# Patient Record
Sex: MALE | Race: WHITE | Employment: UNEMPLOYED | URBAN - METROPOLITAN AREA
[De-identification: names, ages, dates, MRNs, and addresses within clinical notes are randomized per-mention and may not be internally consistent; named-entity substitution may affect disease eponyms.]

---

## 2017-01-16 ENCOUNTER — OFFICE VISIT (OUTPATIENT)
Dept: INTERNAL MEDICINE CLINIC | Facility: CLINIC | Age: 22
End: 2017-01-16

## 2017-01-16 VITALS
BODY MASS INDEX: 30.92 KG/M2 | OXYGEN SATURATION: 98 % | HEART RATE: 102 BPM | WEIGHT: 204 LBS | RESPIRATION RATE: 18 BRPM | HEIGHT: 68 IN | DIASTOLIC BLOOD PRESSURE: 68 MMHG | SYSTOLIC BLOOD PRESSURE: 122 MMHG | TEMPERATURE: 98 F

## 2017-01-16 DIAGNOSIS — J45.20 EXTRINSIC ASTHMA, MILD INTERMITTENT, UNCOMPLICATED: ICD-10-CM

## 2017-01-16 DIAGNOSIS — J01.01 ACUTE RECURRENT MAXILLARY SINUSITIS: Primary | ICD-10-CM

## 2017-01-16 DIAGNOSIS — J32.9 CHRONIC SINUSITIS, UNSPECIFIED LOCATION: ICD-10-CM

## 2017-01-16 DIAGNOSIS — K52.9 ACUTE GASTROENTERITIS: ICD-10-CM

## 2017-01-16 PROBLEM — F32.A DEPRESSION: Status: ACTIVE | Noted: 2017-01-16

## 2017-01-16 PROCEDURE — 99214 OFFICE O/P EST MOD 30 MIN: CPT | Performed by: PHYSICIAN ASSISTANT

## 2017-01-16 RX ORDER — AMOXICILLIN 875 MG/1
875 TABLET, COATED ORAL 2 TIMES DAILY
Qty: 20 TABLET | Refills: 0 | Status: SHIPPED | OUTPATIENT
Start: 2017-01-16 | End: 2017-02-17 | Stop reason: ALTCHOICE

## 2017-01-16 RX ORDER — ESCITALOPRAM OXALATE 10 MG/1
10 TABLET ORAL
Refills: 2 | COMMUNITY
Start: 2016-12-15

## 2017-01-16 RX ORDER — LAMOTRIGINE 25 MG/1
TABLET ORAL
Refills: 1 | COMMUNITY
Start: 2016-12-15 | End: 2018-06-24

## 2017-01-16 NOTE — PATIENT INSTRUCTIONS
Continue current meds  Take antibiotic as directed until completely finished. Contact us if you experience any adverse reaction to the medication. Rest, drink small amount of electrolyte-containing liquids frequently, and eat a bland diet.  Go to ER if u

## 2017-01-16 NOTE — PROGRESS NOTES
HPI:    Patient ID: Livia Nelson is a 25year old male. Patient presents with:  Cough: green productive cough, nausea, diarrhea, vomiting, fevers x5 days     Sinusitis  This is a recurrent problem. The current episode started in the past 7 days.  Richardson Goodpasture Gastrointestinal: Positive for nausea, vomiting, abdominal pain (epigastric, burning) and diarrhea. Negative for constipation and blood in stool. Musculoskeletal: Negative for myalgias, back pain, arthralgias and neck pain.    Skin: Negative for color c Conjunctivae and EOM are normal. Pupils are equal, round, and reactive to light. No scleral icterus. Cardiovascular: Normal rate, regular rhythm, normal heart sounds and intact distal pulses.     Pulmonary/Chest: Effort normal and breath sounds normal. No

## 2017-01-23 DIAGNOSIS — J45.20 EXTRINSIC ASTHMA, MILD INTERMITTENT, UNCOMPLICATED: Primary | ICD-10-CM

## 2017-01-23 DIAGNOSIS — J32.9 CHRONIC SINUSITIS, UNSPECIFIED LOCATION: ICD-10-CM

## 2017-01-23 RX ORDER — MONTELUKAST SODIUM 10 MG/1
TABLET ORAL
Qty: 90 TABLET | Refills: 1 | Status: SHIPPED | OUTPATIENT
Start: 2017-01-23 | End: 2017-02-17

## 2017-02-17 ENCOUNTER — OFFICE VISIT (OUTPATIENT)
Dept: INTERNAL MEDICINE CLINIC | Facility: CLINIC | Age: 22
End: 2017-02-17

## 2017-02-17 VITALS
WEIGHT: 222 LBS | OXYGEN SATURATION: 96 % | SYSTOLIC BLOOD PRESSURE: 128 MMHG | HEIGHT: 68 IN | TEMPERATURE: 99 F | DIASTOLIC BLOOD PRESSURE: 86 MMHG | BODY MASS INDEX: 33.65 KG/M2 | HEART RATE: 96 BPM | RESPIRATION RATE: 12 BRPM

## 2017-02-17 DIAGNOSIS — R63.5 WEIGHT GAIN DUE TO MEDICATION: ICD-10-CM

## 2017-02-17 DIAGNOSIS — T50.905A WEIGHT GAIN DUE TO MEDICATION: ICD-10-CM

## 2017-02-17 DIAGNOSIS — R07.0 THROAT PAIN: Primary | ICD-10-CM

## 2017-02-17 PROCEDURE — 99214 OFFICE O/P EST MOD 30 MIN: CPT | Performed by: PHYSICIAN ASSISTANT

## 2017-02-17 RX ORDER — SULFAMETHOXAZOLE AND TRIMETHOPRIM 800; 160 MG/1; MG/1
1 TABLET ORAL 2 TIMES DAILY
COMMUNITY
End: 2017-03-01

## 2017-02-17 NOTE — PROGRESS NOTES
HPI:    Patient ID: Dex Cordova is a 25year old male. Throat Problem  This is a new problem. The current episode started in the past 7 days. The problem occurs constantly. The problem has been unchanged.  Associated symptoms include a sore thro Prescriptions:  Sulfamethoxazole-TMP -160 MG Oral Tab per tablet Take 1 tablet by mouth 2 (two) times daily. Disp:  Rfl:    Lidocaine Viscous 2 % Mouth/Throat Solution Swish and spit 5mL of solution every 3 hours as needed.   Do not exceed 8 times per posterior oropharyngeal erythema. Tonsils absent  Voice normal   Eyes: Conjunctivae and EOM are normal. Pupils are equal, round, and reactive to light. Neck: Normal range of motion.    Cardiovascular: Normal rate, regular rhythm, normal heart sounds and

## 2017-02-17 NOTE — PATIENT INSTRUCTIONS
Swish and spit viscous lidocaine every 3 hours as needed for throat pain  Watch calorie intake (aiming for 6088-9008 Calories per day) and exercise 3-4 days per week    Weight Management: Healthy Eating  Food is your body’s fuel. You can’t live without it. fast eaters may tend to be overeaters.   · Pay attention to what you eat. Don’t read or watch TV during your meal.  Date Last Reviewed: 1/31/2016  © 4675-1030 [address], [address] [place] [address] [place]. All rights reserved.  This i

## 2017-03-24 ENCOUNTER — LAB ENCOUNTER (OUTPATIENT)
Dept: LAB | Age: 22
End: 2017-03-24
Attending: PHYSICIAN ASSISTANT
Payer: COMMERCIAL

## 2017-03-24 DIAGNOSIS — Z01.89 ENCOUNTER FOR ROUTINE LABORATORY TESTING: ICD-10-CM

## 2017-03-24 LAB
ALBUMIN SERPL-MCNC: 4.1 G/DL (ref 3.5–4.8)
ALP LIVER SERPL-CCNC: 94 U/L (ref 45–117)
ALT SERPL-CCNC: 90 U/L (ref 17–63)
AST SERPL-CCNC: 52 U/L (ref 15–41)
BASOPHILS # BLD AUTO: 0.05 X10(3) UL (ref 0–0.1)
BASOPHILS NFR BLD AUTO: 0.9 %
BILIRUB SERPL-MCNC: 0.3 MG/DL (ref 0.1–2)
BILIRUB UR QL STRIP.AUTO: NEGATIVE
BUN BLD-MCNC: 16 MG/DL (ref 8–20)
CALCIUM BLD-MCNC: 8.9 MG/DL (ref 8.3–10.3)
CHLORIDE: 101 MMOL/L (ref 101–111)
CHOLEST SMN-MCNC: 239 MG/DL (ref ?–170)
CLARITY UR REFRACT.AUTO: CLEAR
CO2: 27 MMOL/L (ref 22–32)
COLOR UR AUTO: YELLOW
CREAT BLD-MCNC: 1 MG/DL (ref 0.7–1.3)
EOSINOPHIL # BLD AUTO: 0.13 X10(3) UL (ref 0–0.3)
EOSINOPHIL NFR BLD AUTO: 2.3 %
ERYTHROCYTE [DISTWIDTH] IN BLOOD BY AUTOMATED COUNT: 13.1 % (ref 11.5–16)
EST. AVERAGE GLUCOSE BLD GHB EST-MCNC: 97 MG/DL (ref 68–126)
GLUCOSE BLD-MCNC: 103 MG/DL (ref 70–99)
GLUCOSE UR STRIP.AUTO-MCNC: NEGATIVE MG/DL
HBA1C MFR BLD HPLC: 5 % (ref ?–5.7)
HCT VFR BLD AUTO: 43.4 % (ref 37–53)
HDLC SERPL-MCNC: 61 MG/DL (ref 45–?)
HDLC SERPL: 3.92 {RATIO} (ref ?–4.97)
HGB BLD-MCNC: 14.9 G/DL (ref 13–17)
IMMATURE GRANULOCYTE COUNT: 0.04 X10(3) UL (ref 0–1)
IMMATURE GRANULOCYTE RATIO %: 0.7 %
KETONES UR STRIP.AUTO-MCNC: NEGATIVE MG/DL
LDLC SERPL CALC-MCNC: 146 MG/DL (ref ?–100)
LYMPHOCYTES # BLD AUTO: 2.2 X10(3) UL (ref 0.9–4)
LYMPHOCYTES NFR BLD AUTO: 39.7 %
M PROTEIN MFR SERPL ELPH: 7.3 G/DL (ref 6.1–8.3)
MCH RBC QN AUTO: 32.7 PG (ref 27–33.2)
MCHC RBC AUTO-ENTMCNC: 34.3 G/DL (ref 31–37)
MCV RBC AUTO: 95.4 FL (ref 80–99)
MONOCYTES # BLD AUTO: 0.61 X10(3) UL (ref 0.1–0.6)
MONOCYTES NFR BLD AUTO: 11 %
NEUTROPHIL ABS PRELIM: 2.51 X10 (3) UL (ref 1.3–6.7)
NEUTROPHILS # BLD AUTO: 2.51 X10(3) UL (ref 1.3–6.7)
NEUTROPHILS NFR BLD AUTO: 45.4 %
NITRITE UR QL STRIP.AUTO: NEGATIVE
NONHDLC SERPL-MCNC: 178 MG/DL (ref ?–120)
PH UR STRIP.AUTO: 7 [PH] (ref 4.5–8)
PLATELET # BLD AUTO: 184 10(3)UL (ref 150–450)
POTASSIUM SERPL-SCNC: 4.3 MMOL/L (ref 3.6–5.1)
PROT UR STRIP.AUTO-MCNC: NEGATIVE MG/DL
RBC # BLD AUTO: 4.55 X10(6)UL (ref 4.3–5.7)
RBC UR QL AUTO: NEGATIVE
RED CELL DISTRIBUTION WIDTH-SD: 45.3 FL (ref 35.1–46.3)
SODIUM SERPL-SCNC: 136 MMOL/L (ref 136–144)
SP GR UR STRIP.AUTO: 1.02 (ref 1–1.03)
TRIGLYCERIDES: 160 MG/DL (ref ?–90)
TSI SER-ACNC: 4.13 MIU/ML (ref 0.35–5.5)
UROBILINOGEN UR STRIP.AUTO-MCNC: <2 MG/DL
VLDL: 32 MG/DL (ref 5–40)
WBC # BLD AUTO: 5.5 X10(3) UL (ref 4–13)

## 2017-03-24 PROCEDURE — 81001 URINALYSIS AUTO W/SCOPE: CPT

## 2017-03-24 PROCEDURE — 80053 COMPREHEN METABOLIC PANEL: CPT

## 2017-03-24 PROCEDURE — 85025 COMPLETE CBC W/AUTO DIFF WBC: CPT

## 2017-03-24 PROCEDURE — 87086 URINE CULTURE/COLONY COUNT: CPT

## 2017-03-24 PROCEDURE — 83036 HEMOGLOBIN GLYCOSYLATED A1C: CPT

## 2017-03-24 PROCEDURE — 36415 COLL VENOUS BLD VENIPUNCTURE: CPT

## 2017-03-24 PROCEDURE — 80061 LIPID PANEL: CPT

## 2017-03-24 PROCEDURE — 84443 ASSAY THYROID STIM HORMONE: CPT

## 2017-04-03 ENCOUNTER — OFFICE VISIT (OUTPATIENT)
Dept: INTERNAL MEDICINE CLINIC | Facility: CLINIC | Age: 22
End: 2017-04-03

## 2017-04-03 VITALS
WEIGHT: 219 LBS | TEMPERATURE: 99 F | HEIGHT: 68 IN | OXYGEN SATURATION: 98 % | SYSTOLIC BLOOD PRESSURE: 138 MMHG | RESPIRATION RATE: 16 BRPM | BODY MASS INDEX: 33.19 KG/M2 | DIASTOLIC BLOOD PRESSURE: 84 MMHG | HEART RATE: 100 BPM

## 2017-04-03 DIAGNOSIS — T50.905A WEIGHT GAIN DUE TO MEDICATION: ICD-10-CM

## 2017-04-03 DIAGNOSIS — Z00.00 ROUTINE PHYSICAL EXAMINATION: Primary | ICD-10-CM

## 2017-04-03 DIAGNOSIS — K52.9 ACUTE GASTROENTERITIS: ICD-10-CM

## 2017-04-03 DIAGNOSIS — R63.5 WEIGHT GAIN DUE TO MEDICATION: ICD-10-CM

## 2017-04-03 DIAGNOSIS — M26.609 TMJ (TEMPOROMANDIBULAR JOINT DISORDER): ICD-10-CM

## 2017-04-03 DIAGNOSIS — R74.01 ELEVATED TRANSAMINASE LEVEL: ICD-10-CM

## 2017-04-03 DIAGNOSIS — E78.2 MIXED HYPERLIPIDEMIA: ICD-10-CM

## 2017-04-03 PROCEDURE — 99395 PREV VISIT EST AGE 18-39: CPT | Performed by: PHYSICIAN ASSISTANT

## 2017-04-03 RX ORDER — NAPROXEN 500 MG/1
500 TABLET ORAL 2 TIMES DAILY WITH MEALS
Qty: 28 TABLET | Refills: 0 | Status: SHIPPED | OUTPATIENT
Start: 2017-04-03 | End: 2018-01-05 | Stop reason: ALTCHOICE

## 2017-04-03 NOTE — PATIENT INSTRUCTIONS
Take naproxen twice daily with meals, as needed, for TMJ pain    Pain Relief Methods for Temporomandibular Disorders (TMD)  You have been diagnosed with temporomandibular disorder (TMD).  This term describes a group of problems related to the temporomandibu the touch and may refer pain to other places. Your health care provider can focus on trigger points using:  · Massage, both inside and outside the mouth. This relaxes muscles and improves circulation.   · Palpation, which is applying pressure to points of t energy without eating too much. Reading food labels can help you make healthy choices. Also, learn new eating habits to manage your weight. All the values on the label are based on one serving. The serving size is the average portion.  Remember to multi professional medical care. Always follow your healthcare professional's instructions.

## 2017-04-03 NOTE — PROGRESS NOTES
Wellness Exam    CC: Patient is presenting for a wellness exam    HPI:   Current Complaints: n/v/d today only; cramping abdomen but no pain otherwise. + chills/sweats, also Cough/congestion and right ear pain x 4 days. Ear pain radiates down to his jaw.  Leeanna Eason Fluticasone Propionate (FLONASE) 50 MCG/ACT Nasal Suspension INSTILL 2 SPRAYS BY NASAL ROUTE DAILY Disp: 48 g Rfl: 1   LORazepam (ATIVAN) 0.5 MG Oral Tab TAKE 1/2 TO 1 TABLET BY MOUTH AT BEDTIME Disp:  Rfl: 1   omeprazole (PRILOSEC) 20 MG Oral Capsule Courtney Estevez icterus. Fundoscopic exam: Red reflex b/l. No exudates, hemorrhages, a/v nicking, or papilledema seen b/l. Neck: Normal range of motion. No thyromegaly present. Cardiovascular: Normal rate, regular rhythm and normal heart sounds.   Exam reveals no fricti

## 2017-04-04 ENCOUNTER — HOSPITAL ENCOUNTER (EMERGENCY)
Facility: HOSPITAL | Age: 22
Discharge: HOME | End: 2017-04-04
Attending: EMERGENCY MEDICINE
Payer: COMMERCIAL

## 2017-04-04 VITALS
BODY MASS INDEX: 33 KG/M2 | OXYGEN SATURATION: 98 % | DIASTOLIC BLOOD PRESSURE: 70 MMHG | SYSTOLIC BLOOD PRESSURE: 124 MMHG | TEMPERATURE: 98 F | WEIGHT: 218.94 LBS | RESPIRATION RATE: 16 BRPM | HEART RATE: 68 BPM

## 2017-04-04 DIAGNOSIS — E86.0 DEHYDRATION: ICD-10-CM

## 2017-04-04 DIAGNOSIS — K52.9 GASTROENTERITIS: Primary | ICD-10-CM

## 2017-04-04 PROCEDURE — 96361 HYDRATE IV INFUSION ADD-ON: CPT

## 2017-04-04 PROCEDURE — 96375 TX/PRO/DX INJ NEW DRUG ADDON: CPT

## 2017-04-04 PROCEDURE — 96374 THER/PROPH/DIAG INJ IV PUSH: CPT

## 2017-04-04 PROCEDURE — S0028 INJECTION, FAMOTIDINE, 20 MG: HCPCS | Performed by: EMERGENCY MEDICINE

## 2017-04-04 PROCEDURE — 83690 ASSAY OF LIPASE: CPT | Performed by: EMERGENCY MEDICINE

## 2017-04-04 PROCEDURE — 85025 COMPLETE CBC W/AUTO DIFF WBC: CPT | Performed by: EMERGENCY MEDICINE

## 2017-04-04 PROCEDURE — 99284 EMERGENCY DEPT VISIT MOD MDM: CPT

## 2017-04-04 PROCEDURE — 80053 COMPREHEN METABOLIC PANEL: CPT | Performed by: EMERGENCY MEDICINE

## 2017-04-04 RX ORDER — KETOROLAC TROMETHAMINE 30 MG/ML
30 INJECTION, SOLUTION INTRAMUSCULAR; INTRAVENOUS ONCE
Status: COMPLETED | OUTPATIENT
Start: 2017-04-04 | End: 2017-04-04

## 2017-04-04 RX ORDER — FAMOTIDINE 10 MG/ML
20 INJECTION, SOLUTION INTRAVENOUS ONCE
Status: COMPLETED | OUTPATIENT
Start: 2017-04-04 | End: 2017-04-04

## 2017-04-04 RX ORDER — ONDANSETRON 8 MG/1
8 TABLET, ORALLY DISINTEGRATING ORAL EVERY 4 HOURS PRN
Qty: 10 TABLET | Refills: 0 | Status: SHIPPED | OUTPATIENT
Start: 2017-04-04 | End: 2017-04-11

## 2017-04-04 RX ORDER — MORPHINE SULFATE 4 MG/ML
4 INJECTION, SOLUTION INTRAMUSCULAR; INTRAVENOUS ONCE
Status: DISCONTINUED | OUTPATIENT
Start: 2017-04-04 | End: 2017-04-04

## 2017-04-04 RX ORDER — ONDANSETRON 2 MG/ML
4 INJECTION INTRAMUSCULAR; INTRAVENOUS ONCE
Status: COMPLETED | OUTPATIENT
Start: 2017-04-04 | End: 2017-04-04

## 2017-04-04 NOTE — ED PROVIDER NOTES
Patient Seen in: [facility] Emergency Department    History   Patient presents with:  Nausea/Vomiting/Diarrhea (gastrointestinal)    Stated Complaint: vomiting    HPI    This is a 16-year-old male complaining of vomiting and diarrhea for the last 24 Fluticasone Propionate (FLONASE) 50 MCG/ACT Nasal Suspension,  INSTILL 2 SPRAYS BY NASAL ROUTE DAILY   LORazepam (ATIVAN) 0.5 MG Oral Tab,  TAKE 1/2 TO 1 TABLET BY MOUTH AT BEDTIME   omeprazole (PRILOSEC) 20 MG Oral Capsule Delayed Release,  TAKE ONE CAPSU 1034 97.4 °F (36.3 °C)   Temp src 04/04/17 1034 Temporal   SpO2 04/04/17 1034 97 %   O2 Device 04/04/17 1034 None (Room air)       Current:/70 mmHg  Pulse 68  Temp(Src) 97.9 °F (36.6 °C) (Temporal)  Resp 16  Wt 99.3 kg  SpO2 98%        Physical Exam Please view results for these tests on the individual orders. CBC W/ DIFFERENTIAL     The patient had an IV placed, and was given a bolus of normal saline. He was given Zofran through the IV. Vital signs improved and the patient felt better.

## 2017-04-17 ENCOUNTER — APPOINTMENT (OUTPATIENT)
Dept: GENERAL RADIOLOGY | Facility: HOSPITAL | Age: 22
End: 2017-04-17
Attending: PEDIATRICS
Payer: COMMERCIAL

## 2017-04-17 ENCOUNTER — HOSPITAL ENCOUNTER (EMERGENCY)
Facility: HOSPITAL | Age: 22
Discharge: HOME | End: 2017-04-17
Attending: PEDIATRICS
Payer: COMMERCIAL

## 2017-04-17 VITALS
OXYGEN SATURATION: 97 % | TEMPERATURE: 98 F | WEIGHT: 210 LBS | BODY MASS INDEX: 32 KG/M2 | DIASTOLIC BLOOD PRESSURE: 79 MMHG | SYSTOLIC BLOOD PRESSURE: 124 MMHG | RESPIRATION RATE: 16 BRPM | HEART RATE: 78 BPM

## 2017-04-17 DIAGNOSIS — S93.401A SPRAIN OF RIGHT ANKLE, UNSPECIFIED LIGAMENT, INITIAL ENCOUNTER: Primary | ICD-10-CM

## 2017-04-17 PROCEDURE — 99283 EMERGENCY DEPT VISIT LOW MDM: CPT

## 2017-04-17 PROCEDURE — 73610 X-RAY EXAM OF ANKLE: CPT

## 2017-04-17 RX ORDER — IBUPROFEN 600 MG/1
600 TABLET ORAL ONCE
Status: DISCONTINUED | OUTPATIENT
Start: 2017-04-17 | End: 2017-04-17

## 2017-04-17 NOTE — ED INITIAL ASSESSMENT (HPI)
Slipped going up stairs approximately 1 hour ago and rolled right ankle / swelling noted to lateral aspect pt reports unable to bear weight

## 2017-04-17 NOTE — ED PROVIDER NOTES
Patient Seen in: [facility] Emergency Department    History   Patient presents with:  Lower Extremity Injury (musculoskeletal)    Stated Complaint: right ankle injury    HPI    65-year-old male complaining of right lateral ankle pain after he rolled Oral Capsule Delayed Release,  TAKE ONE CAPSULE BY MOUTH 3 TO 4 TIMES PER WEEK, 1/2 HOUR PRIOR TO BREAKFAST   Loratadine (CLARITIN OR),  Take 1 tablet by mouth daily.    Albuterol Sulfate (PROAIR HFA IN),  Inhale 2 puffs into the lungs every 6 (six) hours a (Temporal)  Resp 16  Wt 95.255 kg  SpO2 97%        Physical Exam   RLE: Tender with swelling over right lateral malleolus; able to flex and extend at the ankle; able to wiggle toes; will not bear weight right leg due to right lateral ankle pain

## 2017-05-02 ENCOUNTER — TELEPHONE (OUTPATIENT)
Dept: INTERNAL MEDICINE CLINIC | Facility: CLINIC | Age: 22
End: 2017-05-02

## 2017-05-02 NOTE — TELEPHONE ENCOUNTER
Reviewed with the pt asthma Rx treatment. A copy of the AAP will be mailed to the pt's home address once the plan is approved by Dr. Emily Grajeda.

## 2017-05-22 ENCOUNTER — OFFICE VISIT (OUTPATIENT)
Dept: INTERNAL MEDICINE CLINIC | Facility: CLINIC | Age: 22
End: 2017-05-22

## 2017-05-22 VITALS
WEIGHT: 217 LBS | SYSTOLIC BLOOD PRESSURE: 122 MMHG | OXYGEN SATURATION: 97 % | TEMPERATURE: 98 F | BODY MASS INDEX: 32.89 KG/M2 | HEART RATE: 96 BPM | RESPIRATION RATE: 16 BRPM | DIASTOLIC BLOOD PRESSURE: 74 MMHG | HEIGHT: 68 IN

## 2017-05-22 DIAGNOSIS — J01.91 ACUTE RECURRENT SINUSITIS, UNSPECIFIED LOCATION: Primary | ICD-10-CM

## 2017-05-22 PROCEDURE — 99213 OFFICE O/P EST LOW 20 MIN: CPT | Performed by: PHYSICIAN ASSISTANT

## 2017-05-22 RX ORDER — SULFAMETHOXAZOLE AND TRIMETHOPRIM 800; 160 MG/1; MG/1
1 TABLET ORAL 2 TIMES DAILY
COMMUNITY
End: 2017-08-30

## 2017-05-22 RX ORDER — SULFAMETHOXAZOLE AND TRIMETHOPRIM 800; 160 MG/1; MG/1
1 TABLET ORAL 2 TIMES DAILY
COMMUNITY
End: 2017-05-22

## 2017-05-22 RX ORDER — CEFDINIR 300 MG/1
300 CAPSULE ORAL 2 TIMES DAILY
Qty: 20 CAPSULE | Refills: 0 | Status: SHIPPED | OUTPATIENT
Start: 2017-05-22 | End: 2017-06-01

## 2017-05-22 NOTE — PATIENT INSTRUCTIONS
Take antibiotic as directed until completely finished. Contact us if you experience any adverse reaction to the medication. Continue mucinex and sudafed as needed.

## 2017-05-22 NOTE — PROGRESS NOTES
HPI:   Johan Monahan is a 25year old male who presents for upper respiratory symptoms for  4 days. Patient reports cough, sometimes triggers vomiting, PND, productive of thick/yellow mucus .  Patient denies sob, hemoptysis, rash, sore throat or diff ADENOIDECTOMY        Family History   Problem Relation Age of Onset   • Cancer Neg    • Depression Father    • Anxiety Father    • Hypertension Father    • Diabetes Father    • Hypertension Mother    • Depression Mother    • Asthma Mother    • Heart Diseas auscultation b/l normal effort. Cough is spasming/productive.   CARDIO: RRR without murmur rub or gallop  GI: soft, non-distended and nontender, no CVA tenderness    ASSESSMENT AND PLAN:   Sruthi Lee is a 25year old male who presents with Acute r

## 2017-05-24 ENCOUNTER — TELEPHONE (OUTPATIENT)
Dept: INTERNAL MEDICINE CLINIC | Facility: CLINIC | Age: 22
End: 2017-05-24

## 2017-05-24 RX ORDER — PREDNISONE 20 MG/1
20 TABLET ORAL DAILY
Qty: 7 TABLET | Refills: 0 | Status: SHIPPED | OUTPATIENT
Start: 2017-05-24 | End: 2017-05-31

## 2017-05-24 NOTE — TELEPHONE ENCOUNTER
Spoke with pt still coughing despite antibiotic and nebulizer. Per Marcos Leak continue current medications and add on Prednisone 20mg daily x 7 days. D/w pt above treatment plan he expressed understanding. Rx sent.

## 2017-05-25 ENCOUNTER — APPOINTMENT (OUTPATIENT)
Dept: GENERAL RADIOLOGY | Facility: HOSPITAL | Age: 22
End: 2017-05-25
Attending: EMERGENCY MEDICINE
Payer: COMMERCIAL

## 2017-05-25 ENCOUNTER — HOSPITAL ENCOUNTER (EMERGENCY)
Facility: HOSPITAL | Age: 22
Discharge: HOME | End: 2017-05-25
Attending: EMERGENCY MEDICINE
Payer: COMMERCIAL

## 2017-05-25 VITALS
BODY MASS INDEX: 32.88 KG/M2 | HEART RATE: 108 BPM | SYSTOLIC BLOOD PRESSURE: 130 MMHG | HEIGHT: 67.99 IN | TEMPERATURE: 97 F | RESPIRATION RATE: 20 BRPM | DIASTOLIC BLOOD PRESSURE: 74 MMHG | WEIGHT: 216.94 LBS | OXYGEN SATURATION: 100 %

## 2017-05-25 DIAGNOSIS — J45.901 ASTHMA EXACERBATION: Primary | ICD-10-CM

## 2017-05-25 DIAGNOSIS — F41.9 ANXIETY: ICD-10-CM

## 2017-05-25 PROCEDURE — 99283 EMERGENCY DEPT VISIT LOW MDM: CPT

## 2017-05-25 PROCEDURE — 94640 AIRWAY INHALATION TREATMENT: CPT

## 2017-05-25 PROCEDURE — 71020 XR CHEST PA + LAT CHEST (CPT=71020): CPT | Performed by: EMERGENCY MEDICINE

## 2017-05-25 PROCEDURE — 99284 EMERGENCY DEPT VISIT MOD MDM: CPT

## 2017-05-25 RX ORDER — PREDNISONE 20 MG/1
40 TABLET ORAL ONCE
Status: DISCONTINUED | OUTPATIENT
Start: 2017-05-25 | End: 2017-05-25

## 2017-05-25 RX ORDER — PREDNISONE 20 MG/1
60 TABLET ORAL ONCE
Status: COMPLETED | OUTPATIENT
Start: 2017-05-25 | End: 2017-05-25

## 2017-05-25 RX ORDER — ALPRAZOLAM 0.25 MG/1
0.25 TABLET ORAL 3 TIMES DAILY PRN
Qty: 10 TABLET | Refills: 0 | Status: SHIPPED | OUTPATIENT
Start: 2017-05-25 | End: 2017-06-01

## 2017-05-25 RX ORDER — PREDNISONE 20 MG/1
20 TABLET ORAL DAILY
Qty: 5 TABLET | Refills: 0 | Status: SHIPPED | OUTPATIENT
Start: 2017-05-25 | End: 2017-05-30

## 2017-05-25 RX ORDER — IPRATROPIUM BROMIDE AND ALBUTEROL SULFATE 2.5; .5 MG/3ML; MG/3ML
3 SOLUTION RESPIRATORY (INHALATION) ONCE
Status: COMPLETED | OUTPATIENT
Start: 2017-05-25 | End: 2017-05-25

## 2017-05-25 RX ORDER — ALPRAZOLAM 0.5 MG/1
0.5 TABLET ORAL ONCE
Status: COMPLETED | OUTPATIENT
Start: 2017-05-25 | End: 2017-05-25

## 2017-05-25 NOTE — ED INITIAL ASSESSMENT (HPI)
Shortness of breath starting this am.  Cold since last Thursday. Started on abx monday. Started on steroid yesterday. Nebs as needed.   Last neb today at 11am.  Hx asthma

## 2017-05-25 NOTE — ED PROVIDER NOTES
Patient Seen in: [facility] Emergency Department    History   Patient presents with:  Dyspnea CORRY SOB (respiratory)    Stated Complaint: CORRY    HPI    28-year-old male comes the hospital with a complaint of having difficulty with feeling short of br escitalopram 10 MG Oral Tab,  Take 10 mg by mouth once daily.    lamoTRIgine 25 MG Oral Tab,  2 tablets twice daily   PATADAY 0.2 % Ophthalmic Solution,     Montelukast Sodium (SINGULAIR) 10 MG Oral Tab,  TAKE 1 TABLET BY MOUTH AT BEDTIME   Fluticasone Prop noted above. PSFH elements reviewed from today and agreed except as otherwise stated in HPI.     Physical Exam       ED Triage Vitals   BP 05/25/17 1257 147/86 mmHg   Pulse 05/25/17 1257 114   Resp 05/25/17 1257 22   Temp 05/25/17 1257 97.3 °F (36.3 °C) silhouette. MEDIASTINUM: Normal.  PLEURA: Normal. No pleural effusions. BONES: Normal for age.             Medications   ALPRAZolam (XANAX) tab 0.5 mg (0.5 mg Oral Given 5/25/17 1321)   ipratropium-albuterol (DUONEB) nebulizer solution 3 mL (3 mL Nebuliza

## 2017-05-30 ENCOUNTER — OFFICE VISIT (OUTPATIENT)
Dept: INTERNAL MEDICINE CLINIC | Facility: CLINIC | Age: 22
End: 2017-05-30

## 2017-05-30 VITALS
SYSTOLIC BLOOD PRESSURE: 130 MMHG | HEART RATE: 85 BPM | DIASTOLIC BLOOD PRESSURE: 88 MMHG | HEIGHT: 67.5 IN | BODY MASS INDEX: 33.51 KG/M2 | RESPIRATION RATE: 20 BRPM | WEIGHT: 216 LBS | TEMPERATURE: 99 F | OXYGEN SATURATION: 99 %

## 2017-05-30 DIAGNOSIS — J32.0 CHRONIC MAXILLARY SINUSITIS: ICD-10-CM

## 2017-05-30 DIAGNOSIS — R04.0 RECURRENT EPISTAXIS: ICD-10-CM

## 2017-05-30 DIAGNOSIS — J45.21 EXTRINSIC ASTHMA, MILD INTERMITTENT, WITH ACUTE EXACERBATION: Primary | ICD-10-CM

## 2017-05-30 PROCEDURE — 99214 OFFICE O/P EST MOD 30 MIN: CPT | Performed by: PHYSICIAN ASSISTANT

## 2017-05-30 NOTE — PROGRESS NOTES
Ashly Garza is a 25year old male. HPI:   Pt here for ER f/u was having dyspnea and asthma flare. Course as follows:           The patient received his prednisone as well as a DuoNeb and Xanax and feels markedly better at this time.  Lungs are tory omeprazole (PRILOSEC) 20 MG Oral Capsule Delayed Release TAKE ONE CAPSULE BY MOUTH 3 TO 4 TIMES PER WEEK, 1/2 HOUR PRIOR TO BREAKFAST Disp:  Rfl: 3   Loratadine (CLARITIN OR) Take 1 tablet by mouth daily.  Disp:  Rfl:    Albuterol Sulfate (PROAIR HFA IN) ENT      The patient indicates understanding of these issues and agrees to the plan. The patient is asked to return in prn.

## 2017-05-30 NOTE — PATIENT INSTRUCTIONS
Finish prednisone and omnicef antibiotic as prescribed  Continue daily asthma management  Schedule follow-up with Dr. Justin  for chronic sinusitis, recurrent nosebleeds and allergies.

## 2017-06-30 ENCOUNTER — OFFICE VISIT (OUTPATIENT)
Dept: INTERNAL MEDICINE CLINIC | Facility: CLINIC | Age: 22
End: 2017-06-30

## 2017-06-30 VITALS
SYSTOLIC BLOOD PRESSURE: 126 MMHG | HEIGHT: 67.5 IN | OXYGEN SATURATION: 97 % | DIASTOLIC BLOOD PRESSURE: 80 MMHG | RESPIRATION RATE: 18 BRPM | WEIGHT: 226 LBS | BODY MASS INDEX: 35.06 KG/M2 | HEART RATE: 102 BPM | TEMPERATURE: 98 F

## 2017-06-30 DIAGNOSIS — J32.0 CHRONIC MAXILLARY SINUSITIS: ICD-10-CM

## 2017-06-30 DIAGNOSIS — IMO0001 MODERATE INTERMITTENT ASTHMA, WITH ACUTE EXACERBATION: ICD-10-CM

## 2017-06-30 DIAGNOSIS — J22 ACUTE LOWER RESPIRATORY INFECTION: Primary | ICD-10-CM

## 2017-06-30 PROCEDURE — 99214 OFFICE O/P EST MOD 30 MIN: CPT | Performed by: PHYSICIAN ASSISTANT

## 2017-06-30 RX ORDER — AZITHROMYCIN 250 MG/1
TABLET, FILM COATED ORAL
Qty: 6 TABLET | Refills: 0 | Status: SHIPPED | OUTPATIENT
Start: 2017-06-30 | End: 2017-07-18 | Stop reason: ALTCHOICE

## 2017-06-30 RX ORDER — MONTELUKAST SODIUM 10 MG/1
TABLET ORAL
Qty: 90 TABLET | Refills: 0 | Status: SHIPPED | OUTPATIENT
Start: 2017-06-30 | End: 2017-09-24

## 2017-06-30 RX ORDER — PREDNISONE 20 MG/1
20 TABLET ORAL 2 TIMES DAILY
Qty: 14 TABLET | Refills: 0 | Status: SHIPPED | OUTPATIENT
Start: 2017-06-30 | End: 2017-07-07

## 2017-06-30 NOTE — PATIENT INSTRUCTIONS
Schedule follow-up with ENT Dr. Day Kuhn as directed  Start daily inhaler: asmanex, 1 puff daily. Rinse mouth after use. Also start sinus rinses daily in the morning.      If symptoms begin to worsen including shortness of breath and wheezing, s

## 2017-06-30 NOTE — PROGRESS NOTES
HPI:   Munira Lee is a 23year old male who presents for upper respiratory symptoms for  4  days. Patient reports 'wet' cough with thick, green sputum, SOB and fatigue.  Patient denies sinus pressure, ear pain, wheezing, fever, rash, abdominal anil 1300 ) Disp: 28 tablet Rfl: 0      Past Medical History:   Diagnosis Date   • Allergic rhinitis    • Anxiety    • Asthma    • Esophageal reflux    • Extrinsic asthma, unspecified       Past Surgical History:  No date: ADENOIDECTOMY  No date: TONSILLECTOMY nourished,in no apparent distress  SKIN: no rashes, no suspicious lesions  EYES: PERRLA, EOMI, conjunctiva are clear  HEENT: atraumatic, normocephalic, TM's pearly gray with light reflex, oropharynx without erythema, exudates or tonsillar hypertrophy  NECK

## 2017-07-18 ENCOUNTER — OFFICE VISIT (OUTPATIENT)
Dept: INTERNAL MEDICINE CLINIC | Facility: CLINIC | Age: 22
End: 2017-07-18

## 2017-07-18 VITALS
TEMPERATURE: 99 F | DIASTOLIC BLOOD PRESSURE: 82 MMHG | BODY MASS INDEX: 35.37 KG/M2 | WEIGHT: 228 LBS | OXYGEN SATURATION: 99 % | HEART RATE: 115 BPM | SYSTOLIC BLOOD PRESSURE: 130 MMHG | HEIGHT: 67.5 IN | RESPIRATION RATE: 16 BRPM

## 2017-07-18 DIAGNOSIS — J32.0 CHRONIC MAXILLARY SINUSITIS: Primary | ICD-10-CM

## 2017-07-18 PROCEDURE — 99213 OFFICE O/P EST LOW 20 MIN: CPT | Performed by: PHYSICIAN ASSISTANT

## 2017-07-18 RX ORDER — AMOXICILLIN AND CLAVULANATE POTASSIUM 875; 125 MG/1; MG/1
1 TABLET, FILM COATED ORAL 2 TIMES DAILY
Qty: 28 TABLET | Refills: 0 | Status: SHIPPED | OUTPATIENT
Start: 2017-07-18 | End: 2017-08-30 | Stop reason: ALTCHOICE

## 2017-07-18 NOTE — PROGRESS NOTES
HPI:   Bhumi Wharton is a 23year old male who presents for upper respiratory symptoms for  4  days. Patient reports b/l ear pain and cough, congestion, sinus pressure worse on left side. Worse since flying to and from [place].  Patient denies fever, unspecified       Past Surgical History:  No date: ADENOIDECTOMY  No date: TONSILLECTOMY  No date: UPPER GI ENDOSCOPY - REFERRAL   Family History   Problem Relation Age of Onset   • Depression Father    • Anxiety Father    • Hypertension Father    • Diabet gray with light reflex, oropharynx with trace erythema + postnasal drip, no exudates or tonsillar hypertrophy  NECK: supple, no adenopathy, FROM  LUNGS: clear to auscultation b/l normal effort no w/r/r  CARDIO: RRR without murmur rub or gallop  GI: soft, n

## 2017-07-18 NOTE — PATIENT INSTRUCTIONS
Take antibiotic as directed until completely finished. Contact us if you experience any adverse reaction to the medication. Establish with Dr. Frank Baltazar for sinus issues.

## 2017-08-10 DIAGNOSIS — M26.609 TMJ (TEMPOROMANDIBULAR JOINT DISORDER): ICD-10-CM

## 2017-08-10 RX ORDER — NAPROXEN 500 MG/1
500 TABLET ORAL 2 TIMES DAILY WITH MEALS
Qty: 28 TABLET | Refills: 0 | OUTPATIENT
Start: 2017-08-10

## 2017-08-10 NOTE — TELEPHONE ENCOUNTER
Left message for patient to call office. Patient referred to PT and advised on conservative care for TMJ on 4/2017.

## 2017-08-12 ENCOUNTER — HOSPITAL ENCOUNTER (OUTPATIENT)
Dept: CT IMAGING | Facility: HOSPITAL | Age: 22
Discharge: HOME | End: 2017-08-12
Attending: OTOLARYNGOLOGY
Payer: COMMERCIAL

## 2017-08-12 DIAGNOSIS — J32.9 CHRONIC SINUSITIS, UNSPECIFIED LOCATION: ICD-10-CM

## 2017-08-12 PROCEDURE — 70486 CT MAXILLOFACIAL W/O DYE: CPT | Performed by: OTOLARYNGOLOGY

## 2017-08-30 ENCOUNTER — OFFICE VISIT (OUTPATIENT)
Dept: INTERNAL MEDICINE CLINIC | Facility: CLINIC | Age: 22
End: 2017-08-30

## 2017-08-30 VITALS
HEART RATE: 111 BPM | WEIGHT: 223 LBS | DIASTOLIC BLOOD PRESSURE: 80 MMHG | OXYGEN SATURATION: 99 % | RESPIRATION RATE: 16 BRPM | SYSTOLIC BLOOD PRESSURE: 140 MMHG | BODY MASS INDEX: 34.59 KG/M2 | HEIGHT: 67.5 IN

## 2017-08-30 DIAGNOSIS — J06.9 ACUTE URI: Primary | ICD-10-CM

## 2017-08-30 DIAGNOSIS — J45.21 MILD INTERMITTENT EXTRINSIC ASTHMA WITH ACUTE EXACERBATION: ICD-10-CM

## 2017-08-30 PROCEDURE — 99213 OFFICE O/P EST LOW 20 MIN: CPT | Performed by: PHYSICIAN ASSISTANT

## 2017-08-30 RX ORDER — CEFUROXIME AXETIL 500 MG/1
500 TABLET ORAL 2 TIMES DAILY
Qty: 20 TABLET | Refills: 0 | Status: SHIPPED | OUTPATIENT
Start: 2017-08-30 | End: 2018-02-04 | Stop reason: ALTCHOICE

## 2017-08-30 RX ORDER — PREDNISONE 20 MG/1
40 TABLET ORAL DAILY
Qty: 14 TABLET | Refills: 0 | Status: SHIPPED | OUTPATIENT
Start: 2017-08-30 | End: 2017-09-06

## 2017-08-30 NOTE — PROGRESS NOTES
HPI:   Carloz Riley is a 23year old male who presents for upper respiratory symptoms for  4  days. Patient reports cough with green sputum, temp u p t o 99.6, fatigue, green nasal discharge, some chest tightness and wheezing.  Patient denies hemopt Esophageal reflux    • Extrinsic asthma, unspecified       Past Surgical History:  No date: ADENOIDECTOMY  08/18/2017: OTHER SURGICAL HISTORY      Comment: balloon sinuplasty  No date: TONSILLECTOMY  No date: UPPER GI ENDOSCOPY - REFERRAL   Family History EOMI, conjunctiva are clear  HEENT: atraumatic, normocephalic, TM's dulled with serous effusion, oropharynx without erythema, exudates or tonsillar hypertrophy, + clear postnasal drip. Nasal mucosa inflamed.    NECK: supple, mild anterior cervical adenopath

## 2017-09-24 DIAGNOSIS — IMO0001 MODERATE INTERMITTENT ASTHMA, WITH ACUTE EXACERBATION: ICD-10-CM

## 2017-09-25 RX ORDER — MONTELUKAST SODIUM 10 MG/1
TABLET ORAL
Qty: 90 TABLET | Refills: 0 | Status: SHIPPED | OUTPATIENT
Start: 2017-09-25 | End: 2017-12-29

## 2017-12-29 DIAGNOSIS — IMO0001 MODERATE INTERMITTENT ASTHMA, WITH ACUTE EXACERBATION: ICD-10-CM

## 2017-12-29 RX ORDER — MONTELUKAST SODIUM 10 MG/1
TABLET ORAL
Qty: 90 TABLET | Refills: 0 | Status: SHIPPED | OUTPATIENT
Start: 2017-12-29 | End: 2018-03-30

## 2018-01-05 ENCOUNTER — OFFICE VISIT (OUTPATIENT)
Dept: INTERNAL MEDICINE CLINIC | Facility: CLINIC | Age: 23
End: 2018-01-05

## 2018-01-05 VITALS
OXYGEN SATURATION: 98 % | HEIGHT: 67.5 IN | WEIGHT: 251.5 LBS | SYSTOLIC BLOOD PRESSURE: 130 MMHG | BODY MASS INDEX: 39.01 KG/M2 | DIASTOLIC BLOOD PRESSURE: 80 MMHG | TEMPERATURE: 98 F | HEART RATE: 104 BPM | RESPIRATION RATE: 18 BRPM

## 2018-01-05 DIAGNOSIS — R07.89 CHEST WALL PAIN: Primary | ICD-10-CM

## 2018-01-05 PROCEDURE — 99214 OFFICE O/P EST MOD 30 MIN: CPT | Performed by: INTERNAL MEDICINE

## 2018-01-05 RX ORDER — ACETAMINOPHEN AND CODEINE PHOSPHATE 300; 30 MG/1; MG/1
1 TABLET ORAL EVERY 6 HOURS PRN
Qty: 30 TABLET | Refills: 0 | Status: SHIPPED | OUTPATIENT
Start: 2018-01-05 | End: 2018-05-02

## 2018-01-05 RX ORDER — DICLOFENAC SODIUM 75 MG/1
75 TABLET, DELAYED RELEASE ORAL 2 TIMES DAILY
Qty: 28 TABLET | Refills: 0 | Status: SHIPPED | OUTPATIENT
Start: 2018-01-05 | End: 2018-05-02

## 2018-01-05 NOTE — PATIENT INSTRUCTIONS
Noncardiac Chest Pain    Based on your visit today, the healthcare provider doesn’t know what is causing your chest pain. In most cases, people who come to the emergency department with chest pain don’t have a problem with their heart.  Instead, the pain · A change in the type of pain. Call if it feels different, becomes more serious, lasts longer, or begins to spread into your shoulder, arm, neck, jaw, or back.   · Shortness of breath  · You feel more pain when you breathe  · Cough with dark-colored mucus

## 2018-01-05 NOTE — PROGRESS NOTES
HPI:    Patient ID: Kelly Navas is a 23year old male. Chest Pain    This is a new problem. The current episode started yesterday. The onset quality is sudden. The problem occurs constantly. The problem has been gradually worsening.  The pain is at a Sulfamethoxazole-TMP -160 MG Oral Tab per tablet Take 1 tablet by mouth 2 (two) times daily. Disp: 60 tablet Rfl: 5   Cefuroxime Axetil 500 MG Oral Tab Take 1 tablet (500 mg total) by mouth 2 (two) times daily.  Disp: 20 tablet Rfl: 0   Mometasone Fur No orders of the defined types were placed in this encounter. Meds This Visit:  Signed Prescriptions Disp Refills    Diclofenac Sodium 75 MG Oral Tab EC 28 tablet 0      Sig: Take 1 tablet (75 mg total) by mouth 2 (two) times daily.       Acetaminophen

## 2018-01-31 ENCOUNTER — OFFICE VISIT (OUTPATIENT)
Dept: INTERNAL MEDICINE CLINIC | Facility: CLINIC | Age: 23
End: 2018-01-31

## 2018-01-31 VITALS
RESPIRATION RATE: 16 BRPM | DIASTOLIC BLOOD PRESSURE: 88 MMHG | WEIGHT: 250 LBS | HEIGHT: 67.5 IN | OXYGEN SATURATION: 96 % | BODY MASS INDEX: 38.78 KG/M2 | SYSTOLIC BLOOD PRESSURE: 130 MMHG | HEART RATE: 110 BPM | TEMPERATURE: 99 F

## 2018-01-31 DIAGNOSIS — J06.9 ACUTE URI: Primary | ICD-10-CM

## 2018-01-31 DIAGNOSIS — J02.9 SORE THROAT: ICD-10-CM

## 2018-01-31 LAB
CONTROL LINE PRESENT WITH A CLEAR BACKGROUND (YES/NO): YES YES/NO
STREP GRP A CUL-SCR: NEGATIVE

## 2018-01-31 PROCEDURE — 99213 OFFICE O/P EST LOW 20 MIN: CPT | Performed by: PHYSICIAN ASSISTANT

## 2018-01-31 PROCEDURE — 87880 STREP A ASSAY W/OPTIC: CPT | Performed by: PHYSICIAN ASSISTANT

## 2018-01-31 RX ORDER — BUDESONIDE AND FORMOTEROL FUMARATE DIHYDRATE 160; 4.5 UG/1; UG/1
2 AEROSOL RESPIRATORY (INHALATION) 2 TIMES DAILY
Qty: 1 INHALER | Refills: 0 | COMMUNITY
Start: 2018-01-31 | End: 2018-05-02

## 2018-01-31 RX ORDER — BENZONATATE 200 MG/1
200 CAPSULE ORAL 3 TIMES DAILY PRN
Qty: 20 CAPSULE | Refills: 0 | Status: SHIPPED | OUTPATIENT
Start: 2018-01-31 | End: 2018-05-02

## 2018-01-31 NOTE — PATIENT INSTRUCTIONS
Take tessalon every 8 hours as needed for cough  Drink plenty of water  Start symbicort: 2 puffs twice a day. Rinse mouth after use. If your symptoms are resolved by the time the sample is finished, restart asmanex daily inhaler.  If symptoms persist please

## 2018-01-31 NOTE — PROGRESS NOTES
HPI:   Amy Banegas is a 23year old male who presents for upper respiratory symptoms for  1  days. Patient reports cough, chest tightness, sore throat and headache, minimal sputum production. Patient denies sinus pressure, ear pain, wheezing, sob.   Fely Capsule Delayed Release TAKE ONE CAPSULE BY MOUTH 3 TO 4 TIMES PER WEEK, 1/2 HOUR PRIOR TO BREAKFAST Disp:  Rfl: 3   Loratadine (CLARITIN OR) Take 1 tablet by mouth daily.  Disp:  Rfl:    Albuterol Sulfate (PROAIR HFA IN) Inhale 2 puffs into the lungs every edema  GI: no nausea, vomiting or diarrhea  NEURO: denies dizziness, weakness or syncope    EXAM:   /88   Pulse 110   Temp 99 °F (37.2 °C) (Oral)   Resp 16   Ht 67.5\"   Wt 250 lb   SpO2 96%   BMI 38.58 kg/m²   GENERAL: well developed, well nourished

## 2018-02-04 ENCOUNTER — TELEPHONE (OUTPATIENT)
Dept: INTERNAL MEDICINE CLINIC | Facility: CLINIC | Age: 23
End: 2018-02-04

## 2018-02-04 ENCOUNTER — MOBILE ENCOUNTER (OUTPATIENT)
Dept: INTERNAL MEDICINE CLINIC | Facility: CLINIC | Age: 23
End: 2018-02-04

## 2018-02-04 DIAGNOSIS — J06.9 ACUTE URI: ICD-10-CM

## 2018-02-04 DIAGNOSIS — J45.41 MODERATE PERSISTENT ASTHMA WITH EXACERBATION: ICD-10-CM

## 2018-02-04 DIAGNOSIS — J22 ACUTE LOWER RESPIRATORY INFECTION: Primary | ICD-10-CM

## 2018-02-04 RX ORDER — PREDNISONE 20 MG/1
20 TABLET ORAL 2 TIMES DAILY
Qty: 14 TABLET | Refills: 0 | Status: SHIPPED | OUTPATIENT
Start: 2018-02-04 | End: 2018-02-11

## 2018-02-04 RX ORDER — AMOXICILLIN AND CLAVULANATE POTASSIUM 875; 125 MG/1; MG/1
1 TABLET, FILM COATED ORAL 2 TIMES DAILY
Qty: 20 TABLET | Refills: 0 | Status: SHIPPED | OUTPATIENT
Start: 2018-02-04 | End: 2018-02-14

## 2018-02-04 NOTE — TELEPHONE ENCOUNTER
Pt calling with worsening symptoms- chest tightness, productive cough, nasal congestion  Finished zpak with no improvement; using inhalers as directed   augmentin and prednisone rx sent, pt will take as directed.    Pt expresses understanding and agrees to

## 2018-03-30 DIAGNOSIS — IMO0001 MODERATE INTERMITTENT ASTHMA, WITH ACUTE EXACERBATION: ICD-10-CM

## 2018-03-30 RX ORDER — MONTELUKAST SODIUM 10 MG/1
TABLET ORAL
Qty: 90 TABLET | Refills: 0 | Status: SHIPPED | OUTPATIENT
Start: 2018-03-30 | End: 2018-06-24

## 2018-04-27 ENCOUNTER — LAB ENCOUNTER (OUTPATIENT)
Dept: LAB | Age: 23
End: 2018-04-27
Attending: PHYSICIAN ASSISTANT
Payer: COMMERCIAL

## 2018-04-27 DIAGNOSIS — E78.2 MIXED HYPERLIPIDEMIA: Primary | ICD-10-CM

## 2018-04-27 DIAGNOSIS — R74.01 ELEVATED TRANSAMINASE LEVEL: ICD-10-CM

## 2018-04-27 PROCEDURE — 80053 COMPREHEN METABOLIC PANEL: CPT | Performed by: PHYSICIAN ASSISTANT

## 2018-04-27 PROCEDURE — 80061 LIPID PANEL: CPT | Performed by: PHYSICIAN ASSISTANT

## 2018-04-27 PROCEDURE — 36415 COLL VENOUS BLD VENIPUNCTURE: CPT | Performed by: PHYSICIAN ASSISTANT

## 2018-05-02 ENCOUNTER — OFFICE VISIT (OUTPATIENT)
Dept: INTERNAL MEDICINE CLINIC | Facility: CLINIC | Age: 23
End: 2018-05-02

## 2018-05-02 ENCOUNTER — TELEPHONE (OUTPATIENT)
Dept: INTERNAL MEDICINE CLINIC | Facility: CLINIC | Age: 23
End: 2018-05-02

## 2018-05-02 VITALS
RESPIRATION RATE: 16 BRPM | WEIGHT: 244.5 LBS | HEART RATE: 88 BPM | SYSTOLIC BLOOD PRESSURE: 126 MMHG | DIASTOLIC BLOOD PRESSURE: 70 MMHG | TEMPERATURE: 98 F | HEIGHT: 67.5 IN | BODY MASS INDEX: 37.93 KG/M2

## 2018-05-02 DIAGNOSIS — R79.89 ELEVATED LFTS: ICD-10-CM

## 2018-05-02 DIAGNOSIS — Z00.00 ROUTINE PHYSICAL EXAMINATION: Primary | ICD-10-CM

## 2018-05-02 DIAGNOSIS — R04.0 EPISTAXIS: ICD-10-CM

## 2018-05-02 DIAGNOSIS — J30.9 ALLERGIC RHINITIS, UNSPECIFIED SEASONALITY, UNSPECIFIED TRIGGER: Primary | ICD-10-CM

## 2018-05-02 PROCEDURE — 99395 PREV VISIT EST AGE 18-39: CPT | Performed by: PHYSICIAN ASSISTANT

## 2018-05-02 RX ORDER — AZELASTINE 1 MG/ML
SPRAY, METERED NASAL
Qty: 30 ML | Refills: 5 | Status: SHIPPED | OUTPATIENT
Start: 2018-05-02 | End: 2018-06-24

## 2018-05-02 NOTE — TELEPHONE ENCOUNTER
Pt asked that recent lab results be faxed to Dr. Jody Burns psychiatrist.  Results faxed to [phone]

## 2018-05-02 NOTE — PATIENT INSTRUCTIONS
Schedule ultrasound of abdomen. Recheck liver enzymes with blood test in 4-6 weeks. For now, stop any tylenol, alcohol, and avoid fatty or fried foods. Discuss results with psychiatrist as well.  Follow up or ER if onset of yellowed skin or eyes, recurren

## 2018-05-02 NOTE — PROGRESS NOTES
Wellness Exam    CC: Patient is presenting for a wellness exam    HPI:   Current Complaints: not feeling well- has almost daily vomiting and nose bleeds- vomiting triggered by thick sinus drainage, states it triggers his gag reflex.  When he vomits frequent use: Yes           0.0 oz/week     Comment: occ      Current Outpatient Prescriptions on File Prior to Visit:  MONTELUKAST SODIUM 10 MG Oral Tab TAKE 1 TABLET BY MOUTH EVERY DAY AT BEDTIME Disp: 90 tablet Rfl: 0   Sulfamethoxazole-TMP -160 MG Oral Ta for tremors, weakness and numbness. Hematological: Negative for adenopathy. Does not bruise/bleed easily. Psychiatric/Behavioral: Negative for confusion and agitation. The patient is not nervous/anxious.       /70 (BP Location: Left arm)   Pulse 8 and RUQ tenderness. Counseled on d/c'ing alcohol and any tylenol-containing products; labs were forwarded to his psychiatrist to adjust lamictal dose as indicated.  Recheck LFT's 4 wks    Encouraged weight loss with diet/exercise for elevated lipid panel an

## 2018-05-03 RX ORDER — OLOPATADINE HCL 0.2 %
DROPS OPHTHALMIC (EYE)
Qty: 5 ML | Refills: 5 | Status: SHIPPED | OUTPATIENT
Start: 2018-05-03 | End: 2018-06-24

## 2018-05-04 PROBLEM — Z00.00 ROUTINE PHYSICAL EXAMINATION: Status: ACTIVE | Noted: 2018-05-04

## 2018-05-04 PROBLEM — Z00.00 ROUTINE PHYSICAL EXAMINATION: Status: RESOLVED | Noted: 2018-05-04 | Resolved: 2018-05-04

## 2018-05-11 ENCOUNTER — HOSPITAL ENCOUNTER (OUTPATIENT)
Dept: ULTRASOUND IMAGING | Age: 23
Discharge: HOME | End: 2018-05-11
Attending: PHYSICIAN ASSISTANT
Payer: COMMERCIAL

## 2018-05-11 DIAGNOSIS — R79.89 ELEVATED LFTS: ICD-10-CM

## 2018-05-11 PROCEDURE — 76700 US EXAM ABDOM COMPLETE: CPT | Performed by: PHYSICIAN ASSISTANT

## 2018-06-24 PROBLEM — E87.2 METABOLIC ACIDOSIS: Status: ACTIVE | Noted: 2018-06-24

## 2018-06-24 PROBLEM — F51.01 PRIMARY INSOMNIA: Status: ACTIVE | Noted: 2018-06-24

## 2018-06-24 PROBLEM — E87.1 HYPONATREMIA: Status: ACTIVE | Noted: 2018-06-24

## 2018-06-24 PROBLEM — K85.20 ALCOHOL-INDUCED ACUTE PANCREATITIS, UNSPECIFIED COMPLICATION STATUS (HCC): Status: ACTIVE | Noted: 2018-06-24

## 2018-06-24 PROBLEM — E87.6 HYPOKALEMIA: Status: ACTIVE | Noted: 2018-06-24

## 2018-06-24 PROBLEM — K70.10: Status: ACTIVE | Noted: 2018-06-24

## 2018-06-24 PROBLEM — F31.9 BIPOLAR 1 DISORDER (HCC): Chronic | Status: ACTIVE | Noted: 2018-06-24

## 2018-06-24 PROBLEM — K70.10 HEPATITIS, ALCOHOLIC, ACUTE: Status: ACTIVE | Noted: 2018-06-24

## 2018-06-24 PROBLEM — K85.20 ALCOHOL-INDUCED ACUTE PANCREATITIS (HCC): Status: ACTIVE | Noted: 2018-06-24

## 2018-06-24 PROBLEM — E87.20 METABOLIC ACIDOSIS: Status: ACTIVE | Noted: 2018-06-24

## 2018-06-24 PROBLEM — K85.20 ALCOHOL-INDUCED ACUTE PANCREATITIS, UNSPECIFIED COMPLICATION STATUS: Status: ACTIVE | Noted: 2018-06-24

## 2018-06-24 PROBLEM — K85.20 ALCOHOL-INDUCED ACUTE PANCREATITIS: Status: ACTIVE | Noted: 2018-06-24

## 2018-06-24 NOTE — ED INITIAL ASSESSMENT (HPI)
Patient was out on Friday night and had a few alcoholic beverages for his 23st birthday and started vomiting and had abdominal pain Saturday morning. He went out to dinner last night and the vomiting and abdominal pain got worse around 1 am this morning.  H

## 2018-06-24 NOTE — PROGRESS NOTES
NURSING ADMISSION NOTE      Patient admitted via Cart  Oriented to room. Safety precautions initiated. Bed in low position. Call light in reach. Admission orders received. Admission navigator completed. Dilaudid IVP PRN per MAR.  LR running at 150

## 2018-06-24 NOTE — H&P
[facility]  History and Physical     Dong [place] Patient Status:  Emergency    1995 MRN [id number]   Location [facility] [facility] Attending Jones Guzman MD   Hosp Day # 0 PCP Kings Bailey MD     Chief Complaint: Denisse Jara that he drinks alcohol. He reports that he does not use drugs.     Family History:   Family History   Problem Relation Age of Onset   • Depression Father    • Anxiety Father    • Hypertension Father    • Diabetes Father    • Hypertension Mother    • Depress neurological deficits. CNII-XII grossly intact. Musculoskeletal: Moves all extremities. Extremities: No edema or cyanosis. Integument: No rashes or lesions. Psychiatric: Appropriate mood and affect.       Diagnostic Data:      Labs:  Recent Labs   Lab

## 2018-06-24 NOTE — ED PROVIDER NOTES
Patient Seen in: [facility] Emergency Department    History   Patient presents with:  Nausea/Vomiting/Diarrhea (gastrointestinal)  Nose Bleed (nasopharyngeal)    Stated Complaint: Vomiting blood, nose bleed.  Deneis trauma    HPI    Patient is a 23-y 94  Resp: 16  Temp: (!) 96.3 °F (35.7 °C)  Temp src: Temporal  SpO2: 100 %  O2 Device: None (Room air)    Current:BP (!) 142/107   Pulse 68   Temp (!) 96.3 °F (35.7 °C) (Temporal)   Resp 14   Ht 175.3 cm (5' 9\")   Wt 108.9 kg   SpO2 100%   BMI 35.44 kg/m² result                 Please view results for these tests on the individual orders.    URINALYSIS WITH CULTURE REFLEX   RAINBOW DRAW BLUE   RAINBOW DRAW LAVENDER   RAINBOW DRAW LIGHT GREEN   RAINBOW DRAW GOLD       ED Course as of Jun 24 1531  ------------

## 2018-06-25 ENCOUNTER — APPOINTMENT (OUTPATIENT)
Dept: CT IMAGING | Facility: HOSPITAL | Age: 23
DRG: 439 | End: 2018-06-25
Attending: INTERNAL MEDICINE
Payer: COMMERCIAL

## 2018-06-25 PROCEDURE — 74176 CT ABD & PELVIS W/O CONTRAST: CPT | Performed by: INTERNAL MEDICINE

## 2018-06-25 NOTE — PROGRESS NOTES
[facility]  Progress Note     Orma High Patient Status:  Inpatient    1995 MRN [id number]   [facility] Anup Gonzalez MD     Chief Complaint: Abdominal pain    S: Epic.    Medications:   • DiphenhydrAMINE HCl       • Azelastine HCl  2 spray Nasal BID   • Ketotifen Fumarate  1 drop Both Eyes BID   • docusate sodium  100 mg Oral BID       ASSESSMENT / PLAN:     1.  Acute pancreatitis- Will continue on IVF, bowel rest.

## 2018-06-25 NOTE — PAYOR COMM NOTE
--------------  ADMISSION REVIEW     Payor: JUAN JOSÉ [facility]  Subscriber #:  [id number]  Authorization Number: [id number]    Admit date: 6/24/18  Admit time: 1636       Admitting Physician: Minerva Willson MD  Attending Physician:  Kaleb Reyes*  P °C)  Temp src: Temporal  SpO2: 100 %  O2 Device: None (Room air)    Current:BP (!) 142/107   Pulse 68   Temp (!) 96.3 °F (35.7 °C) (Temporal)   Resp 14   Ht 175.3 cm (5' 9\")   Wt 108.9 kg   SpO2 100%   BMI 35.44 kg/m²          Physical Exam  GENERAL: Peter Castro Alcohol-induced acute pancreatitis K85.20 6/24/2018 Unknown    Hypokalemia E87.6 6/24/2018 Yes    Hyponatremia E87.1 6/33/4791 Yes    Metabolic acidosis N03.1 9/11/2295 Yes              [facility]  [facility] and [facility]     [place] Payment Patient [facility] Maternal Grandmother    • Hypertension Other      Aunts   • Hypertension Other      Uncles   • Cancer Neg        Allergies:   Seasonal                    Medications:    No current facility-administered medications on file prior to encounter.      Review of nonspecific but could represent sludge. Probable reactive   inflammation within the distal duodenum. Please see above for further details regarding the chronic findings. ASSESSMENT / PLAN:     1.  Acute pancreatitis- Will continue on IVF, bowel rest. I 4577 Given 30 mg Intravenous Escobar Galicia RN    6/25/2018 0016 Given Other 30 mg Intravenous Larisa Cuellar RN    6/24/2018 1851 Given 30 mg Intravenous Jaki Solis RN      lactated ringers infusion 150 CC/HR    Date Action Dose Route User    6/24/2

## 2018-06-26 NOTE — PROGRESS NOTES
[facility]  Progress Note     Spring Montero Patient Status:  Inpatient    1995 MRN [id number]   [facility] 3NE-A Attending [facility] Day [facility] 2 PCP Charity Chase MD     Chief Complaint: Abdominal pain    S: spray Nasal BID   • Ketotifen Fumarate  1 drop Both Eyes BID   • docusate sodium  100 mg Oral BID       ASSESSMENT / PLAN:     1. Acute pancreatitis- Will continue on IVF. IV pain medication as needed. CT scan done last night consistent with pancreatitis.

## 2018-06-26 NOTE — PROGRESS NOTES
06/25/18 0586   Provider Notification   Reason for Communication Review case  (HR 140s, 180s)   Provider Name Other (comment)  Tory Mcdaniel   Method of Communication Page   Response Waiting for response   Notification Time [phone]   Denies distress/palpitation

## 2018-06-26 NOTE — PLAN OF CARE
Maintains adequate nutritional intake (undernourished) Not Progressing      Verbalizes/displays adequate comfort level or patient's stated pain goal Not Progressing      Minimal or absence of nausea and vomiting Progressing      Maintains or returns to bas

## 2018-06-26 NOTE — PROGRESS NOTES
06/26/18 0553   Provider Notification   Reason for Communication Other (comment)  (update- add PRN metoprolol parameters?)   Provider Name Other (comment)  Mckinley Ache)   Method of Communication Page   Response Waiting for response   Notification Time 8154

## 2018-06-26 NOTE — PLAN OF CARE
CARDIOVASCULAR - ADULT    • Absence of cardiac arrhythmias or at baseline Progressing        GASTROINTESTINAL - ADULT    • Minimal or absence of nausea and vomiting Progressing    • Maintains or returns to baseline bowel function Progressing        METABOL

## 2018-06-27 NOTE — PLAN OF CARE
Resumed care of pt. At 1900. Pt. Is Ax4, calm, and cooperative. Father often tries to speak to pt. I will look at the pt and wait for him to answer in regards to his pain, not that his father is stating he needs pain medications.   Dilaudid-Benadryl-Zofr

## 2018-06-27 NOTE — PROGRESS NOTES
[facility]  Progress Note     Becky Aguayo Patient Status:  Inpatient    1995 MRN [id number]   [facility] 3NE-A Attending Keegan YusufMAC [facility] Day # 3 PCP Kendy Renteria MD     Chief Complaint: Abdominal pain    S: push  40 mg Intravenous Q24H   • enoxaparin  40 mg Subcutaneous Daily   • Azelastine HCl  2 spray Nasal BID   • Ketotifen Fumarate  1 drop Both Eyes BID   • docusate sodium  100 mg Oral BID       ASSESSMENT / PLAN:     1.  Acute pancreatitis:  IVF's, pain m

## 2018-06-27 NOTE — PLAN OF CARE
Problem: Patient/Family Goals  Goal: Patient/Family Short Term Goal  Patient's Short Term Goal: \"for my stomach to stop hurting\"  6/25(noc): pain management, control heart rate/BP    Interventions:   - PRN dilaudid/toradol, PRN metoprolol  - LR IVF  - NP

## 2018-06-27 NOTE — CONSULTS
[facility]                       Gastroenterology Consultation-[facility]    Kelly Navas Patient Status:  Inpatient    1995 MRN [id number]   [facility] ([place], [place] 3NE-A Attending Licha Wolfe MD   [facility] Day # 3 PCP Oral Nightly   Pantoprazole Sodium (PROTONIX) 40 mg in Sodium Chloride 0.9 % 10 mL IV push 40 mg Intravenous Q24H   LORazepam (ATIVAN) injection 0.5 mg 0.5 mg Intravenous Q6H PRN   HYDROmorphone HCl (DILAUDID) 1 MG/ML injection 0.5 mg 0.5 mg Intravenous Q2 Intravenous Once       Allergies:   Seasonal                    SocHx:    Smoking status: Quit 2016   Alcohol use: Daily, 2-3 drinks/day    Drug use: No      FamHx: The patient has no family history of colon cancer or other gastrointestinal malignancies; most pronounced in upper abd and LUQ, mildly distended with the presence of hypoactive bowel sounds; No hepatosplenomegaly; no rebound or guarding;  No ascites is clinically apparent; no tympany to percussion    Ext: No peripheral edema or cyanosis    Skin: Radiology) NRDR ([address] [place] [address]) which includes the Dose Index Registry.      PATIENT STATED HISTORY: (As transcribed by Technologist)  per chart, acute pancreatitis with contineous  vomiting     FINDINGS:  Evaluation of the visceral org CONTRAST (CPT=74176), 4/17/2015, 13:25. INDICATIONS:  R79.89 Other specified abnormal findings of blood chemistry     TECHNIQUE:  Real time gray-scale ultrasound was used to evaluate the abdomen.   The exam includes images of the liver, gallbladder, com Approved by: Sergio Malone MD         Impression: 26year old male with hx of bi-polar disorder, GERD, and obesity who presented to the ER 6/24/18 with acute abd pain, found to have a lipase above 7500 and CT a/p suggesting acute pancreatitis.  LFTs on gallbladder seen on CT scan from this admission, could be related to little po intake in the 2 days leading up to his presentation.   He has had episodes of morning nausea/vomiting in the past 6 months, which may be related to reflux, but could be atypical

## 2018-06-28 ENCOUNTER — APPOINTMENT (OUTPATIENT)
Dept: CT IMAGING | Facility: HOSPITAL | Age: 23
DRG: 439 | End: 2018-06-28
Attending: INTERNAL MEDICINE
Payer: COMMERCIAL

## 2018-06-28 ENCOUNTER — ANESTHESIA EVENT (OUTPATIENT)
Dept: ENDOSCOPY | Facility: HOSPITAL | Age: 23
DRG: 439 | End: 2018-06-28
Payer: COMMERCIAL

## 2018-06-28 ENCOUNTER — APPOINTMENT (OUTPATIENT)
Dept: GENERAL RADIOLOGY | Facility: HOSPITAL | Age: 23
DRG: 439 | End: 2018-06-28
Attending: NURSE PRACTITIONER
Payer: COMMERCIAL

## 2018-06-28 PROCEDURE — 71046 X-RAY EXAM CHEST 2 VIEWS: CPT | Performed by: NURSE PRACTITIONER

## 2018-06-28 PROCEDURE — 74178 CT ABD&PLV WO CNTR FLWD CNTR: CPT | Performed by: INTERNAL MEDICINE

## 2018-06-28 NOTE — PROGRESS NOTES
[facility]  Report of Consultation    Dong Hodgson Patient Status:  Inpatient    1995 MRN [id number]   [facility] 3NE-A Attending Samantha Freitas MD   [facility] Day # 4 PCP Kings Bailey MD     Date of Admission:  2018  Date of C Potassium Chloride ER (K-DUR M20) CR tab 40 mEq, 40 mEq, Oral, Q4H  •  DiphenhydrAMINE HCl (BENADRYL) injection 12.5 mg, 12.5 mg, Intravenous, Q4H PRN **OR** diphenhydrAMINE (BENADRYL) cap/tab 25 mg, 25 mg, Oral, Q4H PRN  •  Naloxone HCl (NARCAN) 0.4 MG/ML temperature (!) 101.3 °F (38.5 °C), temperature source Oral, resp. rate 18, height 175.3 cm (5' 9\"), weight 108.9 kg (240 lb), SpO2 93 %.     Laboratory Data:    Lab Results  Component Value Date   WBC 11.6 06/28/2018   HGB 13.2 06/28/2018   HCT 39.3 06/28

## 2018-06-28 NOTE — CONSULTS
INFECTIOUS DISEASE CONSULTATION    Dong Hodgson Patient Status:  Inpatient    1995 MRN [id number]   [facility] 3NE-A Attending Samantha Freitas MD   Hosp Day # 4 PCP Kings Bailey MD that he quit smoking about 2 years ago. He quit after 3.50 years of use. He has never used smokeless tobacco. He reports that he drinks alcohol. He reports that he does not use drugs.     Allergies:    Seasonal                  Morphine                RASH Oral, BID  •  PEG 3350 (MIRALAX) powder packet 17 g, 17 g, Oral, Daily PRN  •  magnesium hydroxide (MILK OF MAGNESIA) 400 MG/5ML suspension 30 mL, 30 mL, Oral, Daily PRN  •  bisacodyl (DULCOLAX) rectal suppository 10 mg, 10 mg, Rectal, Daily PRN  •  FLEET List:     Extrinsic asthma     Esophageal reflux     Depression     Chronic sinusitis     Weight gain due to medication     Alcohol-induced acute pancreatitis     Hyponatremia     Hypokalemia     Metabolic acidosis     Alcohol-induced acute pancreatitis, u

## 2018-06-28 NOTE — CONSULTS
[facility] Pharmacy Note:  Pain Consult    Mary Ramirez is a 26year old male started on Dilaudid PCA by Dr. Riri Miller. Pharmacy was consulted to review medication profile and to discontinue previously ordered narcotics and sedatives.     Medication profile was

## 2018-06-28 NOTE — PROGRESS NOTES
Gastroenterology Progress Note  Patient Name: Jose Luis Avelar  Chief Complaint: Acute pancreatitis  S: The patient reports \"terrible abdominal pain\" this morning. He is tolerating a clear liquid diet without difficulty.   No n/v.  He reports feeling const biliary compression from the pancreas vs resolving alcohol-induced inflammatory reaction in the liver vs passed cbd stone. His biliary sludge on u/s, I think, reflects decreased oral intake in the days leading up to his admission.   However, true gallstone

## 2018-06-28 NOTE — PLAN OF CARE
A/Ox4  On RA,   ST on tele  C/o pain in mid abdomen, PRN Dilaudid administered per MAR  VSS, afebrile  IVF infusing LR at 125ml/hr  CLD- tolerating well  Per pt and father, had 3 BM yesterday (6/26); however, they feel like he is constipated.  Bowel soun

## 2018-06-28 NOTE — PLAN OF CARE
Pt developed fever prior to CT   Tylenol given. BC pending. Pt became dizzy when she got out of bed. Family with many questions.  Pt tends to minimalize her symptoms

## 2018-06-28 NOTE — PROGRESS NOTES
[facility]  Progress Note     Ivette Rai Patient Status:  Inpatient    1995 MRN [id number]   [facility] 3NE-A Attending [facility] Day # 4 PCP Von Jacinto MD     Chief Complaint: Abdominal pain    S: Q4H   • escitalopram  10 mg Oral Daily   • lamoTRIgine  50 mg Oral BID   • Montelukast Sodium  10 mg Oral Nightly   • pantoprazole (PROTONIX) IV push  40 mg Intravenous Q24H   • enoxaparin  40 mg Subcutaneous Daily   • Azelastine HCl  2 spray Nasal BID   •

## 2018-06-28 NOTE — PROGRESS NOTES
[facility] Pharmacy Note: Antimicrobial Weight Dose Adjustment for: piperacillin/tazobactam (Peg Deem)    Michele Farias is a 26year old male who has been prescribed piperacillin/tazobactam (ZOSYN) 3.375 g every 8 hours.   CrCl is estimated creatinine clearance is 1

## 2018-06-28 NOTE — ANESTHESIA PREPROCEDURE EVALUATION
PRE-OP EVALUATION    Patient Name: Dena Perdue    Pre-op Diagnosis: Alcohol-induced acute pancreatitis with uninfected necrosis [K85.21]    Procedure(s):  ESOPHAGOGASTRODUODENOSCOPY with nasal-jejunal tube placement  WITH FLUORO    Surgeon(s) and Role: mL Intravenous Once   [COMPLETED] ondansetron HCl (ZOFRAN) injection 4 mg 4 mg Intravenous Once   [COMPLETED] HYDROmorphone HCl (DILAUDID) 1 MG/ML injection 0.5 mg 0.5 mg Intravenous Q30 Min PRN   [] 0.9%  NaCl infusion  Intravenous Continuous   Aze omeprazole 20 MG Oral Capsule Delayed Release Take 20 mg by mouth every morning before breakfast. Disp:  Rfl:    Olopatadine HCl (PATADAY) 0.2 % Ophthalmic Solution Place 1 drop into both eyes every morning.  Disp:  Rfl:    escitalopram 10 MG Oral Tab [facility] 06/29/2018   CO2 26.0 06/29/2018   BUN 9 06/29/2018   CREATSERUM 0.46 (L) 06/29/2018   GLU 96 06/29/2018   CA 8.1 (L) 06/29/2018            Airway      Mallampati: II  Mouth opening: >3 FB  TM distance: > 6 cm  Neck ROM: full Cardiovascular      Rhythm: re

## 2018-06-28 NOTE — CM/SW NOTE
MSW , Charge RN, Bedside Rn discussed patient's post d/c needs. No identified needs at this time, MSW will remain available should needs change. Family present a lot, supportive.
negative

## 2018-06-28 NOTE — PAYOR COMM NOTE
--------------  CONTINUED STAY REVIEW    Andrés Quintanilla [facility]  Subscriber #:  [id number]  Authorization Number: [id number]    Admit date: 6/24/18  Admit time: 1636    Admitting Physician: Nhung Tompkins MD  Attending Physician:  Yessica Hooks MD  Primary from the pancreas vs resolving inflammatory reaction in the liver vs passed cbd stone. His biliary sludge on u/s, I think, reflects decreased oral intake in the days leading up to his admission. However, true gallstone disease is also possible.   He repor William Grullon RN    6/27/2018 2030 Given 1 mg Intravenous Jack Mishra RN    6/27/2018 1819 Given 1 mg Intravenous Loan Mosqueda RN         lactated ringers infusion     Date Action Dose Route User    6/28/2018 1598 New Bag (none) Intravenous Jesús Murillo

## 2018-06-29 ENCOUNTER — SURGERY (OUTPATIENT)
Age: 23
End: 2018-06-29

## 2018-06-29 ENCOUNTER — ANESTHESIA (OUTPATIENT)
Dept: ENDOSCOPY | Facility: HOSPITAL | Age: 23
DRG: 439 | End: 2018-06-29
Payer: COMMERCIAL

## 2018-06-29 ENCOUNTER — APPOINTMENT (OUTPATIENT)
Dept: GENERAL RADIOLOGY | Facility: HOSPITAL | Age: 23
DRG: 439 | End: 2018-06-29
Attending: INTERNAL MEDICINE
Payer: COMMERCIAL

## 2018-06-29 PROCEDURE — 74328 X-RAY BILE DUCT ENDOSCOPY: CPT | Performed by: INTERNAL MEDICINE

## 2018-06-29 NOTE — PLAN OF CARE
CARDIOVASCULAR - ADULT    • Absence of cardiac arrhythmias or at baseline Progressing        GASTROINTESTINAL - ADULT    • Minimal or absence of nausea and vomiting Progressing    • Maintains or returns to baseline bowel function Progressing    • Maintains

## 2018-06-29 NOTE — PROGRESS NOTES
Pain Service  Patient off unit for EGD with Anesthesia sedation - spoke with parents - patient has reported pain well controlled with Dilaudid PCA  0.3 mg pt bolus  8 min lockout  2.2 mg hourly limit  Will follow up tomorrow  CPM    Nataliya Campbell RN

## 2018-06-29 NOTE — PROGRESS NOTES
[facility]  Progress Note     Spring Montero Patient Status:  Inpatient    1995 MRN [id number]   [facility] 3NE-A Attending Kaleb Brandt [facility] Day # 5 PCP Charity Chase MD     Chief Complaint: Abdominal pain    S: piperacillin-tazobactam  4.5 g Intravenous Q8H   • escitalopram  10 mg Oral Daily   • lamoTRIgine  50 mg Oral BID   • Montelukast Sodium  10 mg Oral Nightly   • pantoprazole (PROTONIX) IV push  40 mg Intravenous Q24H   • enoxaparin  40 mg Subcutaneous Nenita

## 2018-06-29 NOTE — OPERATIVE REPORT
EGD operative report  Patient Name: Jennifer Zhang  Procedure: Esophagogastroduodenoscopy with small bowel push enteroscopy and placement of nasojejunal feeding tube under fluoroscopic guidance  Indication: Acute necrotizing pancreatitis  Attending: Heath Mercado junction / esophagogastric junction / diaphragmatic impression were appreciated at 40 cm from the incisors. Stomach:   The gastric body, antrum, fundus, cardia, and angularis were normal, without masses, polyps, ulcers, erosions, diverticula, or varic

## 2018-06-29 NOTE — ANESTHESIA POSTPROCEDURE EVALUATION
[address] Patient Status:  Inpatient   Age/Gender 26year old male MRN [id number]   Location [facility]. Attending Mari Rao, [address] [facility] [address] Day # 5 PCP Juliano Doll MD       Anesthesia Post-op Note    Procedure(s):  ES

## 2018-06-29 NOTE — PROGRESS NOTES
[facility]                INFECTIOUS DISEASE PROGRESS NOTE    Soco Daugherty Patient Status:  Inpatient    1995 MRN [id number]   [facility] 3NE-A Attending Doris Rosa MD   Hosp Day # 5 PCP Rashel Valle MD     Antibiotics Alcohol-induced acute pancreatitis     Hyponatremia     Hypokalemia     Metabolic acidosis     Alcohol-induced acute pancreatitis, unspecified complication status     Hepatitis, alcoholic, acute     Primary insomnia     B[facility] 1 disorder ([facility]     Acute b

## 2018-06-29 NOTE — DIETARY NOTE
[facility]    NUTRITION INITIAL ASSESSMENT    Pt does not meet malnutrition criteria.     NUTRITION DIAGNOSIS/PROBLEM:    Inadequate oral intake related to altered GI function as evidenced by NPO with necrotic pancreatitis & need for NJ feeds    NUTRIT means of nutrition at goal to meet 100% patient nutrition prescription    MEDICATIONS:  Noted    LABS:  Noted    Pt is at moderate nutrition risk    FOLLOW-UP DATE:7/3/18  Marii Rosario RD,LDN  Pager #[phone]

## 2018-06-29 NOTE — PROGRESS NOTES
Patient returned per cart to room alert and able to answer questions.  Patient said his throat is a little sore and his pain level is about a 5 at this time encouraged patient to use dilaudid pca

## 2018-06-29 NOTE — PLAN OF CARE
Problem: Patient/Family Goals  Goal: Patient/Family Short Term Goal  Patient's Short Term Goal: \"for my stomach to stop hurting\"  6/25(noc): pain management, control heart rate/BP  6/28 NOC: \"to go for my procedure early tomorrow\"    Interventions:   -

## 2018-06-29 NOTE — DIETARY NOTE
Nutrition Short Note    Consult for Jejunal Feeds. Recommend Vital 1.2 @ 30ml/hr. Advance 10ml q6 to goal 60ml/hr x 24hours. 200ml h20 flush q3h.      Total intake to provide: 1440ml, 1728kcals (16kcals/kg), 108g pro (1g/kg), 2766ml free h20 (25ml/kg)

## 2018-06-30 ENCOUNTER — APPOINTMENT (OUTPATIENT)
Dept: GENERAL RADIOLOGY | Facility: HOSPITAL | Age: 23
DRG: 439 | End: 2018-06-30
Attending: INTERNAL MEDICINE
Payer: COMMERCIAL

## 2018-06-30 PROCEDURE — 74018 RADEX ABDOMEN 1 VIEW: CPT | Performed by: INTERNAL MEDICINE

## 2018-06-30 NOTE — PROGRESS NOTES
Gastroenterology Progress Note  Patient Name: David Boyer  Chief Complaint: Acute necrotizing pancreatitis  S: The patient reports that his pain continues to be well controlled. No n/v. He is tolerating tube feeds and is almost at goal rate.   No fever nasojejunal feeding tube.  Thank you.     COMPARISON:  EDZEESHAN , XR CHEST PA + LAT CHEST (CPT=71046), 6/28/2018, 16:44.     TECHNIQUE:  Supine AP view was obtained.     PATIENT STATED HISTORY: (As transcribed by Technologist)  Nasojejunal feeding tube posit

## 2018-06-30 NOTE — PROGRESS NOTES
[facility]  Progress Note     Sandoval Joellen Patient Status:  Inpatient    1995 MRN [id number]   [facility] 3NE-A Att[facility] Day [facility] 6 PCP Jenna Moctezuma MD     Chief Complaint: Abdominal pain    S: data reviewed in Epic.     Medications:   • escitalopram  10 mg Oral Nightly   • piperacillin-tazobactam  4.5 g Intravenous Q8H   • lamoTRIgine  50 mg Oral BID   • Montelukast Sodium  10 mg Oral Nightly   • pantoprazole (PROTONIX) IV push  40 mg Intravenous

## 2018-06-30 NOTE — PROGRESS NOTES
Acute Pain Service    PCA Follow-up Note    Indication: Other: abdominal pain r/t alcohol-induced pancreatitis with necrosis    SUBJECTIVE:  Pt states pain well managed      OBJECTIVE:    Pain Score:    3/ at rest    4-5/ with movement    Patient is com continue PCA for now as well. Acute Pain Service will continue to follow with you. Dr. Landen Toure M.D.   [facility], [employer].

## 2018-06-30 NOTE — PLAN OF CARE
Received patient a/ox4. Continues to report abdominal tenderness. No other new complaints. Tube feeds advanced to 40 ml/hr and patient is tolerating well. Reports pain is well controlled on dilaudid PCA.  He was updated on plan of care and verbalizes unders

## 2018-06-30 NOTE — PROGRESS NOTES
[facility]                INFECTIOUS DISEASE PROGRESS NOTE    Spring Montero Patient Status:  Inpatient    1995 MRN [id number]   [facility] 3NE-A Attending Florinda Peoples MD   Hosp Day # 6 PCP Charity Chase MD     Antibiotics date    ASSESSMENT/PLAN:    1.  Pancreatitis with distal pancreatic necrosis in pt with ETOH abuse  -Fever could just be due to pancreatic inflammation vs superinfection but seems non toxic and doing better, making infection less likely  -Follow cx  -contin

## 2018-07-01 ENCOUNTER — APPOINTMENT (OUTPATIENT)
Dept: GENERAL RADIOLOGY | Facility: HOSPITAL | Age: 23
DRG: 439 | End: 2018-07-01
Attending: HOSPITALIST
Payer: COMMERCIAL

## 2018-07-01 PROBLEM — J90 PLEURAL EFFUSION ON LEFT: Status: ACTIVE | Noted: 2018-07-01

## 2018-07-01 PROCEDURE — 71045 X-RAY EXAM CHEST 1 VIEW: CPT | Performed by: HOSPITALIST

## 2018-07-01 PROCEDURE — 71046 X-RAY EXAM CHEST 2 VIEWS: CPT | Performed by: HOSPITALIST

## 2018-07-01 NOTE — PROGRESS NOTES
Acute Pain Service    PCA Follow-up Note    Indication: Other: abdominal pain r/t alcohol-induced pancreatitis with necrosis      SUBJECTIVE:    Pt states he feels \"pretty good\"    OBJECTIVE:    Pain Score:    2/ at rest    3-4/ with movement    Patie

## 2018-07-01 NOTE — PLAN OF CARE
Patient c/o pain 2/10 today. Denies nausea. Resting well. Mother at bedside. Will continue to monitor.

## 2018-07-01 NOTE — PROGRESS NOTES
Gastroenterology Progress Note  Patient Name: Jose Luis Avelar  Chief Complaint: Acute pancreatitis with necrosis  S: The patient reports that his pain is under good control. He did not use the PCA through the night, as he slept.   He reports using it every in the days leading up to the presentation. His liver chemistries have taken a jump today. The pattern is hepatocellular, and could reflect a early biliary process. This could be antibiotic related.   It is unlikely to be related to the tube feeds, which

## 2018-07-01 NOTE — PROGRESS NOTES
[facility]  Progress Note     Dong Rema Patient Status:  Inpatient    1995 MRN [id number]   [facility] 3NE-A Attending [facility] Day [facility] 7 PCP Kings Bailey MD     Chief Complaint: abdominal pain    S: hours. No results for input(s): TROP, CK in the last 168 hours. Imaging: Imaging data reviewed in Epic.     Medications:   • potassium chloride  20 mEq Intravenous Once   • escitalopram  10 mg Oral Nightly   • piperacillin-tazobactam  4.5 g Intra

## 2018-07-02 ENCOUNTER — APPOINTMENT (OUTPATIENT)
Dept: ULTRASOUND IMAGING | Facility: HOSPITAL | Age: 23
DRG: 439 | End: 2018-07-02
Attending: INTERNAL MEDICINE
Payer: COMMERCIAL

## 2018-07-02 PROCEDURE — 93975 VASCULAR STUDY: CPT | Performed by: INTERNAL MEDICINE

## 2018-07-02 NOTE — PLAN OF CARE
CARDIOVASCULAR - ADULT     • Absence of cardiac arrhythmias or at baseline Progressing           GASTROINTESTINAL - ADULT     • Minimal or absence of nausea and vomiting Progressing     • Maintains or returns to baseline bowel function Progressing     • Ma

## 2018-07-02 NOTE — PROGRESS NOTES
Acute Pain Service     PCA Follow-up Note     Indication: Other: abdominal pain r/t alcohol-induced pancreatitis with necrosis        SUBJECTIVE:     Pt states he is satisfied with pain control on PCA     OBJECTIVE:     5/10     Patient is comfortable.

## 2018-07-02 NOTE — PAYOR COMM NOTE
--------------  CONTINUED STAY REVIEW    Payor: [employer] PP  Subscriber #:  [id number]  Authorization Number: [id number]    Admit date: 6/24/18  Admit time: 1636    Admitting Physician: Kristeen Hashimoto, MD  Attending Physician:  Gurmeet Crabtree* sludge in the gallbladder, which was thought to be related to decreased PO intake in the days leading up to the presentation. His liver chemistries have taken a jump today. The pattern is hepatocellular, and could reflect a early biliary process.   This c Route User    7/1/2018 2213 New Bag 20 mg Intravenous Abraham Lara RN      lactated ringers infusion     Date Action Dose Route User    7/1/2018 2219 New Bag (none) Intravenous Jorden Burnett RN         ondansetron HCl (ZOFRAN) injection 4 mg     Date Action

## 2018-07-02 NOTE — PROGRESS NOTES
Gastroenterology Progress Note  Isa Hu Patient Status:  Inpatient    1995 MRN [id number]   [facility] 3NE-A [facility] PCP Kee Rodriguez MD Imaging:  PROCEDURE:  XR CHEST PA + LAT CHEST (CPT=71046)     INDICATIONS:  evaluate pleural effusion     COMPARISON:  EDWARD , XR CHEST PA + LAT CHEST (CPT=71046), 6/28/2018, 16:44. EDZEESHAN , XR CHEST AP PORTABLE  (CPT=71045), 7/01/2018, 1:25.      GRAEME ml/hr  3. Continue abx per ID recommendations  4. Continue PCA per pain service recommendations   5. Antiemetics as needed   6. Continue to encourage activity as tolerated; IS hourly while awake   7.  CBC, CMP, Mg in AM  Renaldo Cranker, APN  9:55 AM  7/2/20

## 2018-07-02 NOTE — PROGRESS NOTES
[facility]  Progress Note     Elester Rota Patient Status:  Inpatient    1995 MRN [id number]   [facility] 3NE-A Attending [facility] Day # 8 PCP Kimani Null MD     Chief Complaint: abdominal pain    S: input(s): PTP, INR in the last 168 hours. No results for input(s): TROP, CK in the last 168 hours. Imaging: Imaging data reviewed in Epic.     Medications:   • potassium chloride  20 mEq Intravenous Once   • escitalopram  10 mg Oral Nightly   • p

## 2018-07-02 NOTE — PROGRESS NOTES
[facility]                INFECTIOUS DISEASE PROGRESS NOTE    Jose Luis Avelar Patient Status:  Inpatient    1995 MRN [id number]   [facility] 3NE-A Attending Darius Stratton MD   [facility] Day # 8 PCP Mirta Monsalve MD     Antibiotics Blood,peripheral     Blood Culture Result No Growth 3 Days   Blood Culture FREQ X 2 [[id number]] Collected: 06/28/18 1601   Order Status: Completed Lab Status: Preliminary result Updated: 07/01/18 1700   Specimen: Blood from Blood,peripheral     Blood Cultu

## 2018-07-03 ENCOUNTER — APPOINTMENT (OUTPATIENT)
Dept: MRI IMAGING | Facility: HOSPITAL | Age: 23
DRG: 439 | End: 2018-07-03
Attending: INTERNAL MEDICINE
Payer: COMMERCIAL

## 2018-07-03 PROCEDURE — 74181 MRI ABDOMEN W/O CONTRAST: CPT | Performed by: INTERNAL MEDICINE

## 2018-07-03 NOTE — PROGRESS NOTES
[facility]                INFECTIOUS DISEASE PROGRESS NOTE    Krishna Davis Patient Status:  Inpatient    1995 MRN [id number]   [facility] 3NE-A Attending Randall Aschoff, MD   [address] Day # 9 PCP Misha Stafford MD     Antibiotics 1552   Order Status: Completed Lab Status: Preliminary result Updated: 07/01/18 1700   Specimen: Blood from Blood,peripheral     Blood Culture Result No Growth 3 Days   Blood Culture FREQ X 2 [[id number]] Collected: 06/28/18 1601   Order Status: Completed L

## 2018-07-03 NOTE — PROGRESS NOTES
[facility]  Progress Note     Ulises Simms Patient Status:  Inpatient    1995 MRN [id number]   [facility] 3NE-A [facility] PCP Ganga Vann MD     Chief Complaint: abdominal pain    S: input(s): PTP, INR in the last 168 hours. No results for input(s): TROP, CK in the last 168 hours. Imaging: Imaging data reviewed in Epic.     Medications:   • potassium chloride  20 mEq Intravenous Once   • escitalopram  10 mg Oral Nightly   • p

## 2018-07-03 NOTE — PROGRESS NOTES
Acute Pain Service     PCA Follow-up Note     Indication: Other: abdominal pain r/t alcohol-induced pancreatitis with necrosis        SUBJECTIVE:     Pt states he is satisfied with pain control on PCA     OBJECTIVE:     5/10 - worse in left lower quadrant

## 2018-07-03 NOTE — PROGRESS NOTES
Gastroenterology Progress Note  Matt Casas Patient Status:  Inpatient    1995 MRN [id number]   [facility] 3NE-A Attending [facility] Day [facility] PCP Alexandru Mac MD [place] and flow velocities. Patent right, middle and left hepatic veins. Patent splenic vein. Patent inferior vena cava above the hepatic veins. Nonvisualization of the inferior vena cava and abdominal aorta secondary to bowel gas.       Asse

## 2018-07-03 NOTE — PAYOR COMM NOTE
--------------  CONTINUED STAY REVIEW    Payor: [employer]  Subscriber #:  [id number]  Authorization Number: [id number]    Admit date: 6/24/18  Admit time: 1636    Admitting Physician: Eliceo Gilliam MD  Attending Physician:  Rama Wood full  · Burnett: N/A  · Central line: N/A     Estimated date of discharge: TBD  Discharge is dependent on: clinical improvment  At this point Mr. Rosario [place] is expected to be discharge to: [address] LAST 1 DAY:     HYDROmorphone (D

## 2018-07-04 NOTE — PROGRESS NOTES
Acute Pain Service     PCA Follow-up Note     Indication: Other: abdominal pain r/t pancreatitis with necrosis        SUBJECTIVE:     Pt states he is satisfied with pain control on PCA     OBJECTIVE:     3/10 - left lower quadrant - better today     Patien

## 2018-07-04 NOTE — PLAN OF CARE
A/O x4  VSS.   NSR - ST on tele  Room air  Afebrile this shift  IV zosyn  PCA dilaudid, reporting pain @ manageable levels  IVF infusing  Tolerating ube feedings through [facility] KYRA  Electrolyte protocol  Refusing SCDs & lovenox  Ambulates in room w/ SB assist  Esvin

## 2018-07-04 NOTE — PROGRESS NOTES
Gastroenterology Progress Note  Montezumabruna CansecoMichelle Patient Status:  Inpatient    1995 MRN [id number]   [facility] 3NE-A Attending Christina Pienda [facility] Day # 10 PCP Galina Sheikh portal veins with normal phases [place] and flow velocities. Patent right, middle and left hepatic veins. Patent splenic vein. Patent inferior vena cava above the hepatic veins.      Nonvisualization of the inferior vena cava and abdominal aorta secondar

## 2018-07-04 NOTE — PROGRESS NOTES
[facility] HOSPITALIST  Progress Note     Adele Riojas Patient Status:  Inpatient    1995 MRN [id number]   [facility] 3NE-A Attending Mateusbrijesh [facility] Day # 10 PCP Nubia Diaz MD     Chief Complaint: Abdominal pain    S 6.6  6.6  6.8       Estimated Creatinine Clearance: 212.5 mL/min (A) (based on SCr of 0.55 mg/dL (L)). No results for input(s): PTP, INR in the last 168 hours. No results for input(s): TROP, CK in the last 168 hours.          Imaging: Imaging data rev

## 2018-07-05 NOTE — PROGRESS NOTES
[facility] HOSPITALIST  Progress Note     [facility] Patient Status:  Inpatient    1995 MRN [id number]   [facility] 3NE-A Attending Vincenzo Malcolmt[address] Day # 6 PCP Juliano Doll MD     Chief Complaint: Abdominal pain    S 104*  80*   BILT  0.8  0.5  0.6   TP  6.6  6.8  7.5       Estimated Creatinine Clearance: 201.5 mL/min (A) (based on SCr of 0.58 mg/dL (L)). No results for input(s): PTP, INR in the last 168 hours.     No results for input(s): TROP, CK in the last 168 ho

## 2018-07-05 NOTE — PROGRESS NOTES
Acute Pain Service     PCA Follow-up Note     Indication: Other: abdominal pain r/t pancreatitis with necrosis        SUBJECTIVE:     Pt states he is satisfied with pain control on PCA     OBJECTIVE:     2-3/10 - left lower quadrant - even better today

## 2018-07-05 NOTE — PROGRESS NOTES
[facility]                INFECTIOUS DISEASE PROGRESS NOTE    Lori Cuenca Patient Status:  Inpatient    1995 MRN [id number]   [facility] 3NE-A Attending Shorty Shrestha MD   [facility] Day # 6 PCP Jenna Moctezuma MD     Antibiotic 102  100*   CO2  27.0  27.0  25.0   ALKPHO  145*  133*  129*   AST  85*  58*  42*   ALT  133*  104*  80*   BILT  0.8  0.5  0.6   TP  6.6  6.8  7.5       No results found for: [facility]  Microbiology    Blood Culture FREQ X 2 [[id number]] Collected: 06/28/18 155

## 2018-07-05 NOTE — PLAN OF CARE
A&O X4  Pain control with PCA dilaudid. PRN benadryl at night to help with itching and sleep   Pt able to rest   Tube feed at goal.   No nausea.    Will continue to monitor     CARDIOVASCULAR - ADULT    • Absence of cardiac arrhythmias or at baseline Prog

## 2018-07-05 NOTE — PAYOR COMM NOTE
--------------  CONTINUED STAY REVIEW    Payor: [facility] PPO  Subscriber #:  [id number]  Authorization Number: [id number]    Admit date: 6/24/18  Admit time: 1636    Admitting Physician: Lianet Gomez MD  Attending Physician:  Joshua Gaviria* (based on SCr of 0.58 mg/dL (L)). ASSESSMENT / PLAN:      1. Acute pancreatitis:  IVF's, pain meds. CT with necrosis, NJ placed. Olivia Minion is well controlled with PCA. US negative. MRCP not tolerated. 2.   Sinus tachycardia-reactive to #1.  Improving.   3

## 2018-07-05 NOTE — PROGRESS NOTES
Gastroenterology Progress Note  Kellybean Navas Patient Status:  Inpatient    1995 MRN [id number]   [facility] 3NE-A Attending [facility] Day # 6 PCP Dionisio Weston bi-polar disorder and EtOH abuse admitted 6/24 with acute abd pain found to have acute pancreatitis thought to be d/t EtOH as he had a 2 day binge days prior to admission.  His hospitalization has been c/b necrosis noted on CT a/p (6/28) with fevers (on abx

## 2018-07-05 NOTE — PROGRESS NOTES
Received patient at 0730  A/O x 4  NSR on tele  NJ tube in place, receiving TF (Vital 1.2 @ 60- goal)  Denies nausea/vomiting/diarrhea  BM reported yesterday  Dilaudid PCA for abdominal pain  PRN Norco for pain  Sips of clears/ice chips  Up with assist  IV

## 2018-07-06 ENCOUNTER — APPOINTMENT (OUTPATIENT)
Dept: ULTRASOUND IMAGING | Facility: HOSPITAL | Age: 23
DRG: 439 | End: 2018-07-06
Attending: INTERNAL MEDICINE
Payer: COMMERCIAL

## 2018-07-06 PROCEDURE — 76700 US EXAM ABDOM COMPLETE: CPT | Performed by: INTERNAL MEDICINE

## 2018-07-06 NOTE — DIETARY NOTE
[facility] ASSESSMENT    Pt does not meet malnutrition criteria.     NUTRITION DIAGNOSIS/PROBLEM:    Inadequate oral intake related to altered GI function as evidenced by NPO with necrotic pancreatitis & need for NJ feeds - bry HISTORY:   Wt Readings from Last 12 Encounters:  06/24/18 : 108.9 kg (240 lb)  05/02/18 : 110.9 kg (244 lb 8 oz)  01/31/18 : 113.4 kg (250 lb)  01/05/18 : 114.1 kg (251 lb 8 oz)  08/30/17 : 101.2 kg (223 lb)  07/18/17 : 103.4 kg (228 lb)  06/30/17 : 102.5

## 2018-07-06 NOTE — PROGRESS NOTES
Gastroenterology Progress Note  Amy Banegas Patient Status:  Inpatient    1995 MRN [id number]   [facility]CP Tony Bustillo [place] (6/28) with fevers (on abx, blood cx with NGTD) and elevated LFTs (continue to improve). Doppler was normal.    Plan:   1. Trial of FLD (low dairy) after abdominal US to make sure that he does not have gallstones or CBD stone  2.  Will see how patient does

## 2018-07-06 NOTE — PROGRESS NOTES
[facility]                INFECTIOUS DISEASE PROGRESS NOTE    Satya Malone Patient Status:  Inpatient    1995 MRN [id number]   [facility] 3NE-A Attending Lai Tan MD   Hosp Day # 12 PCP Dariana Lopez MD     Antibiotic Problem list reviewed:  Patient Active Problem List:     Extrinsic asthma     Esophageal reflux     Depression     Chronic sinusitis     Weight gain due to medication     Alcohol-induced acute pancreatitis     Hyponatremia     Hypokalemia     M

## 2018-07-06 NOTE — PROGRESS NOTES
[facility] H[facility]PITAL[facility]  Progress Note     Jennifer Zhang Patient Status:  Inpatient    1995 MRN [id number]   [facility] 3NE-A Attending Arlette Hayese[address] Day # 15 PCP Milena Cruz MD     Chief Complaint: Abdominal pain    S BILT  0.5  0.6  0.5   TP  6.8  7.5  7.9       Estimated Creatinine Clearance: 185.5 mL/min (A) (based on SCr of 0.63 mg/dL (L)). No results for input(s): PTP, INR in the last 168 hours. No results for input(s): TROP, CK in the last 168 hours.

## 2018-07-06 NOTE — PLAN OF CARE
A/O x 4. Room air, lungs clear  Tele NSR, ST when up in bathroom or walking halls  Tolerating clear liquids and PO medications with sips well, denies N/V.   Still complains of abdominal pain, under control with PO norco PRN and occasional IV dilaudid bolus

## 2018-07-06 NOTE — PROGRESS NOTES
Pain Service  PCA dcd yesterday - pump at bedside for RN to administer clinician boluses of Dilaudid if needed - received 1 clinician dose of IV Dilaudid via pump last night for back spasm  Rates abdominal pain 2/10 - tolerating Norco   Zanaflex low dose i

## 2018-07-06 NOTE — PAYOR COMM NOTE
--------------  CONTINUED STAY REVIEW    Payor: [employer]  Subscriber #:  [id number]  Authorization Number: [id number]    Admit date: 6/24/18  Admit time: 1636    Admitting Physician: Chica Ruiz MD  Attending Physician:  Alba Bailey* bolus from bag     Date Action Dose Route User    7/6/2018 0629 Bolus from Bag 0.5 mg Intravenous Batsheva Jefferson RN    7/6/2018 0145 Bolus from Bag 0.5 mg Intravenous Batsheva Jefferson RN      lactated ringers infusion     Date Action Dose Route User    2/9

## 2018-07-07 NOTE — PROGRESS NOTES
[facility]                INFECTIOUS DISEASE PROGRESS NOTE    Particia Degroot Patient Status:  Inpatient    1995 MRN [id number]   [facility] 3NE-A Attending Shilpa Ennis MD   [facility] Day # 15 PCP Alejandro De Dios MD     Antibiotic Problem list reviewed:  Patient Active Problem List:     Extrinsic asthma     Esophageal reflux     Depression     Chronic sinusitis     Weight gain due to medication     Alcohol-induced acute pancreatitis     Hyponatremia     Hypokalemia     M

## 2018-07-07 NOTE — PLAN OF CARE
AOx4  VSS, on RA  Tele- NSR, ST  Lester Q6 as needed  Dilaudid bolus as needed  Electrolyte protocol  TF- Vital 1.2 @ 60ml/hr, 200ml flush Q3  MRCP not tolerated  Up ad jah  Switched to Full liquid diet along with TF per GI  IVF- Lactated ringers @ 83ml/hr

## 2018-07-07 NOTE — PROGRESS NOTES
[facility] HOSPITAL[facility]  Progress Note     Orma High Patient Status:  Inpatient    1995 MRN [id number]   [facility]NE-A [facility] PCP Anup Gonzalez MD     Chief Complaint: Abdominal pain    S Estimated Creatinine Clearance: 171.8 mL/min (A) (based on SCr of 0.68 mg/dL (L)). No results for input(s): PTP, INR in the last 168 hours. No results for input(s): TROP, CK in the last 168 hours. Imaging: Imaging data reviewed in Epic.

## 2018-07-07 NOTE — PROGRESS NOTES
Gastroenterology Progress Note  Ana Payment Patient Status:  Inpatient    1995 MRN [id number]   [facility] 3NE-A Attending Mary Rodgerss[address] Day # 15 PCP Galina Clay found to have acute pancreatitis thought to be d/t EtOH as he had a 2 day binge days prior to admission. His hospitalization has been c/b necrosis noted on CT a/p (6/28) with fevers (on abx, blood cx with NGTD) and elevated LFTs (continue to improve).   Anita Fuller

## 2018-07-07 NOTE — PLAN OF CARE
Assumed patient care at 1900  Patient A&O x 4  Complaints of pain and discomfort overnight. Received 2 doses of Norco and 2 bolus doses of IV dilaudid. With some relief noted. Patient was in tears at one point after getting up to the washroom.    VSS  Tele-

## 2018-07-08 NOTE — PROGRESS NOTES
Gastroenterology Progress Note  Ivette Rai Patient Status:  Inpatient    1995 MRN [id number]   [facility] 3NE-A Attending Bina Fajardo [facility] Day # 15 PCP Marybelle Mortimer, Reva Coaster a 2 day binge days prior to admission. His hospitalization has been c/b necrosis noted on CT a/p (6/28) with fevers (on abx, blood cx with NGTD) and elevated LFTs (continue to improve). Doppler was normal and US showed fatty liver. Plan:   1.  Low fat d

## 2018-07-08 NOTE — PROGRESS NOTES
NURSING DISCHARGE NOTE    Discharged Home via Wheelchair. Accompanied by Family member and Support staff  Belongings Taken by patient/family. Pt discharged home. Discharge paperwork given to pt. Father at bedside at time.  Pt understands importance

## 2018-07-08 NOTE — PLAN OF CARE
A&O x4   No pain at this time. Pt feels pain management is effective.    Tolerating diet well  Will continue to monitor   Plan to discharge tomorrow       CARDIOVASCULAR - ADULT    • Absence of cardiac arrhythmias or at baseline Adequate for Discharge

## 2018-07-09 ENCOUNTER — PATIENT MESSAGE (OUTPATIENT)
Dept: CASE MANAGEMENT | Age: 23
End: 2018-07-09

## 2018-07-09 ENCOUNTER — PATIENT OUTREACH (OUTPATIENT)
Dept: CASE MANAGEMENT | Age: 23
End: 2018-07-09

## 2018-07-09 DIAGNOSIS — Z02.9 ENCOUNTERS FOR ADMINISTRATIVE PURPOSE: ICD-10-CM
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
## 2018-07-11 NOTE — PAYOR COMM NOTE
--------------  DISCHARGE REVIEW    Payor: [employer]  Subscriber #:  [id number]  Authorization Number: [id number]    Admit date: 6/24/18  Admit time:  1636  Discharge Date: 7/8/2018 10:47 AM     Admitting Physician: Roseanna Goodwin MD  Primary Care Phys

## 2018-07-12 ENCOUNTER — OFFICE VISIT (OUTPATIENT)
Dept: INTERNAL MEDICINE CLINIC | Facility: CLINIC | Age: 23
End: 2018-07-12

## 2018-07-12 VITALS
HEIGHT: 67.5 IN | DIASTOLIC BLOOD PRESSURE: 72 MMHG | WEIGHT: 229 LBS | TEMPERATURE: 98 F | BODY MASS INDEX: 35.52 KG/M2 | HEART RATE: 101 BPM | SYSTOLIC BLOOD PRESSURE: 128 MMHG | RESPIRATION RATE: 18 BRPM

## 2018-07-12 DIAGNOSIS — M54.50 LUMBAR PAIN: ICD-10-CM

## 2018-07-12 DIAGNOSIS — R10.32 LEFT LOWER QUADRANT PAIN: ICD-10-CM

## 2018-07-12 DIAGNOSIS — K70.10 HEPATITIS, ALCOHOLIC, ACUTE: ICD-10-CM

## 2018-07-12 DIAGNOSIS — K85.21 ALCOHOL INDUCED ACUTE PANCREATITIS WITH UNINFECTED NECROSIS: ICD-10-CM

## 2018-07-12 DIAGNOSIS — Z09 HOSPITAL DISCHARGE FOLLOW-UP: Primary | ICD-10-CM

## 2018-07-12 PROBLEM — E87.20 METABOLIC ACIDOSIS: Status: RESOLVED | Noted: 2018-06-24 | Resolved: 2018-07-12

## 2018-07-12 PROBLEM — E87.2 METABOLIC ACIDOSIS: Status: RESOLVED | Noted: 2018-06-24 | Resolved: 2018-07-12

## 2018-07-12 PROBLEM — E87.1 HYPONATREMIA: Status: RESOLVED | Noted: 2018-06-24 | Resolved: 2018-07-12

## 2018-07-12 PROBLEM — J45.20 MILD INTERMITTENT ASTHMA WITHOUT COMPLICATION: Status: ACTIVE | Noted: 2018-07-12

## 2018-07-12 PROBLEM — E87.6 HYPOKALEMIA: Status: RESOLVED | Noted: 2018-06-24 | Resolved: 2018-07-12

## 2018-07-12 PROBLEM — J45.20 MILD INTERMITTENT ASTHMA WITHOUT COMPLICATION (HCC): Status: ACTIVE | Noted: 2018-07-12

## 2018-07-12 PROBLEM — K85.20 ALCOHOL-INDUCED ACUTE PANCREATITIS (HCC): Status: RESOLVED | Noted: 2018-06-24 | Resolved: 2018-07-12

## 2018-07-12 PROBLEM — K85.20 ALCOHOL-INDUCED ACUTE PANCREATITIS, UNSPECIFIED COMPLICATION STATUS (HCC): Status: RESOLVED | Noted: 2018-06-24 | Resolved: 2018-07-12

## 2018-07-12 PROBLEM — K85.20 ALCOHOL-INDUCED ACUTE PANCREATITIS, UNSPECIFIED COMPLICATION STATUS: Status: RESOLVED | Noted: 2018-06-24 | Resolved: 2018-07-12

## 2018-07-12 PROBLEM — K85.20 ALCOHOL-INDUCED ACUTE PANCREATITIS: Status: RESOLVED | Noted: 2018-06-24 | Resolved: 2018-07-12

## 2018-07-12 PROBLEM — J90 PLEURAL EFFUSION ON LEFT: Status: RESOLVED | Noted: 2018-07-01 | Resolved: 2018-07-12

## 2018-07-12 PROCEDURE — 99495 TRANSJ CARE MGMT MOD F2F 14D: CPT | Performed by: INTERNAL MEDICINE

## 2018-07-12 PROCEDURE — 1111F DSCHRG MED/CURRENT MED MERGE: CPT | Performed by: INTERNAL MEDICINE

## 2018-07-12 RX ORDER — HYDROCODONE BITARTRATE AND ACETAMINOPHEN 10; 325 MG/1; MG/1
1 TABLET ORAL EVERY 8 HOURS PRN
Qty: 40 TABLET | Refills: 0 | Status: SHIPPED | OUTPATIENT
Start: 2018-07-12 | End: 2018-08-16

## 2018-07-12 NOTE — PROGRESS NOTES
HPI:    Kahlil Carreon is a 26year old male here today for hospital follow up.    He was discharged from Inpatient hospital, [facility] to [address] [place] [address] Date: per [facility]  [facility] Diagnosis:    Per EPIC    Interactive started on abx and was kept NPO. Pt had a NJ tube placed and was started on tube feeds. Pt was started on PCA and pain service was consulted. Pt over the next week was titrated down on pain meds and was started on clears which were advanced as tolerated. surgical history (08/18/2017).     He family history includes Anxiety in his father; Asthma in his maternal grandmother and mother; Depression in his father, maternal grandfather, maternal grandmother, mother, and paternal grandmother; Diabetes in his fathe affect.     ASSESSMENT/ PLAN:   Diagnoses and all orders for this visit:    Hospital discharge follow-up    Hepatitis, alcoholic, acute    Alcohol induced acute pancreatitis with uninfected necrosis  -     HYDROcodone-acetaminophen (NORCO)  MG Oral Ta Refills    HYDROcodone-acetaminophen (NORCO)  MG Oral Tab 40 tablet 0      Sig: Take 1 tablet by mouth every 8 (eight) hours as needed for Pain.            Imaging & Consults:  None         Transitional Care Management Certification:  I certify that t

## 2018-07-12 NOTE — PATIENT INSTRUCTIONS
Discharge Instructions for Acute Pancreatitis  You have been diagnosed with acute pancreatitis. Your pancreas is inflamed or swollen. The pancreas is an organ that makes digestive juices and hormones. Gallstones are a common cause of pancreatitis.  These · Be aware of symptoms that may mean your pancreatitis has come back. These symptoms include belly pain, nausea and vomiting, and fever. · Keep all follow-up appointments with your provider. Problems can often show up later.   Follow-up  Follow up with you

## 2018-07-13 ENCOUNTER — TELEPHONE (OUTPATIENT)
Dept: INTERNAL MEDICINE CLINIC | Facility: CLINIC | Age: 23
End: 2018-07-13

## 2018-07-15 DIAGNOSIS — IMO0001 MODERATE INTERMITTENT ASTHMA, WITH ACUTE EXACERBATION: ICD-10-CM

## 2018-07-16 RX ORDER — MONTELUKAST SODIUM 10 MG/1
TABLET ORAL
Qty: 90 TABLET | Refills: 2 | Status: SHIPPED | OUTPATIENT
Start: 2018-07-16 | End: 2019-07-19

## 2018-08-16 ENCOUNTER — OFFICE VISIT (OUTPATIENT)
Dept: INTERNAL MEDICINE CLINIC | Facility: CLINIC | Age: 23
End: 2018-08-16
Payer: COMMERCIAL

## 2018-08-16 VITALS
BODY MASS INDEX: 34.38 KG/M2 | HEIGHT: 67.5 IN | DIASTOLIC BLOOD PRESSURE: 80 MMHG | SYSTOLIC BLOOD PRESSURE: 120 MMHG | HEART RATE: 88 BPM | WEIGHT: 221.63 LBS | RESPIRATION RATE: 16 BRPM | TEMPERATURE: 99 F

## 2018-08-16 DIAGNOSIS — J01.00 ACUTE NON-RECURRENT MAXILLARY SINUSITIS: Primary | ICD-10-CM

## 2018-08-16 PROCEDURE — 99213 OFFICE O/P EST LOW 20 MIN: CPT | Performed by: NURSE PRACTITIONER

## 2018-08-16 RX ORDER — AMOXICILLIN AND CLAVULANATE POTASSIUM 875; 125 MG/1; MG/1
1 TABLET, FILM COATED ORAL 2 TIMES DAILY
Qty: 20 TABLET | Refills: 0 | Status: SHIPPED | OUTPATIENT
Start: 2018-08-16 | End: 2018-08-26

## 2018-08-16 NOTE — PROGRESS NOTES
HPI:    Patient ID: Amy Banegas is a 26year old male. Patient presents with:  Cough: productive, started with sore throat, sinus pressure and congestion, PND started Tuesday       Cough   This is a new problem.  The current episode started in the pas Paternal Grandmother    • Heart Murmur [OTHER] Paternal Grandmother    • Hypertension Maternal Grandfather    • Depression Maternal Grandfather    • Stroke Maternal Grandfather    • Depression Maternal Grandmother    • Asthma Maternal Grandmother    • Hype Delayed Release Take 20 mg by mouth every morning before breakfast. Disp:  Rfl:    Olopatadine HCl (PATADAY) 0.2 % Ophthalmic Solution Place 1 drop into both eyes every morning. Disp:  Rfl:    escitalopram 10 MG Oral Tab Take 10 mg by mouth once daily.  Dis person, place, and time. He appears well-developed and well-nourished. HENT:   Head: Normocephalic and atraumatic.    Right Ear: Tympanic membrane and ear canal normal.   Left Ear: Tympanic membrane and ear canal normal.   Nose: Mucosal edema and rhinorrh

## 2018-08-23 NOTE — PROGRESS NOTES
Multiple attempts to reach the pt and messages left with no returned phone call. Pt went to [place] with PCP on 7/12/18. Closing encounter.

## 2018-09-13 ENCOUNTER — OFFICE VISIT (OUTPATIENT)
Dept: INTERNAL MEDICINE CLINIC | Facility: CLINIC | Age: 23
End: 2018-09-13
Payer: COMMERCIAL

## 2018-09-13 VITALS
HEART RATE: 114 BPM | HEIGHT: 67 IN | RESPIRATION RATE: 16 BRPM | SYSTOLIC BLOOD PRESSURE: 118 MMHG | WEIGHT: 219 LBS | DIASTOLIC BLOOD PRESSURE: 76 MMHG | TEMPERATURE: 98 F | BODY MASS INDEX: 34.37 KG/M2 | OXYGEN SATURATION: 97 %

## 2018-09-13 DIAGNOSIS — J45.20 MILD INTERMITTENT ASTHMA WITHOUT COMPLICATION: Primary | ICD-10-CM

## 2018-09-13 DIAGNOSIS — H10.13 ALLERGIC CONJUNCTIVITIS OF BOTH EYES: ICD-10-CM

## 2018-09-13 DIAGNOSIS — Z23 NEED FOR PNEUMOCOCCAL VACCINATION: ICD-10-CM

## 2018-09-13 DIAGNOSIS — Z23 NEED FOR INFLUENZA VACCINATION: ICD-10-CM

## 2018-09-13 PROCEDURE — 90471 IMMUNIZATION ADMIN: CPT | Performed by: INTERNAL MEDICINE

## 2018-09-13 PROCEDURE — 90670 PCV13 VACCINE IM: CPT | Performed by: INTERNAL MEDICINE

## 2018-09-13 PROCEDURE — 90472 IMMUNIZATION ADMIN EACH ADD: CPT | Performed by: INTERNAL MEDICINE

## 2018-09-13 PROCEDURE — 99213 OFFICE O/P EST LOW 20 MIN: CPT | Performed by: INTERNAL MEDICINE

## 2018-09-13 PROCEDURE — 90686 IIV4 VACC NO PRSV 0.5 ML IM: CPT | Performed by: INTERNAL MEDICINE

## 2018-09-13 RX ORDER — OLOPATADINE HYDROCHLORIDE 2 MG/ML
1 SOLUTION/ DROPS OPHTHALMIC EVERY MORNING
Qty: 3 BOTTLE | Refills: 2 | Status: SHIPPED | OUTPATIENT
Start: 2018-09-13 | End: 2019-04-22

## 2018-09-13 NOTE — PROGRESS NOTES
HPI:    Patient ID: Christine Malcolm is a 26year old male. HPI  Christine Malcolm is a 26year old male. HPI:   The patient presents for recheck of asthma sx's. Lately the patient's asthma has been  under excellent control.  When symptoms occur they are candice No      Alcohol/week: 0.0 oz    Drug use: No       REVIEW OF SYSTEMS:   GENERAL HEALTH: feels well otherwise  SKIN: denies any unusual skin lesions or rashes  RESPIRATORY: denies productive cough, hemoptysis, shortness of breath with exertion  CARDIOVASCUL Capsule Delayed Release Take 20 mg by mouth every morning before breakfast. Disp:  Rfl:    escitalopram 10 MG Oral Tab Take 10 mg by mouth once daily.  Disp:  Rfl: 2     Allergies:  Morphine                RASH   PHYSICAL EXAM:   Physical Exam           ASS

## 2018-11-27 ENCOUNTER — TELEPHONE (OUTPATIENT)
Dept: INTERNAL MEDICINE CLINIC | Facility: CLINIC | Age: 23
End: 2018-11-27

## 2018-11-27 NOTE — TELEPHONE ENCOUNTER
---- Message -----     From: Heike García     Sent: 11/27/2018 12:06 PM CST       To: Kathi Terrazas PA-C  Subject: Other    Alfonso's on acutaine and is parched. . ideas? He drinks a ton of water, iced tea and some juices and still parched.  It's wor

## 2018-11-27 NOTE — TELEPHONE ENCOUNTER
Gerald Delgado,     We recommend reaching out to the dermatologist who prescribed the medication for appropriate continuity of care. I have copied this message to his chart.  Please send all future messages regarding his health through his own [employer] a

## 2019-01-13 ENCOUNTER — HOSPITAL ENCOUNTER (OUTPATIENT)
Age: 24
Discharge: HOME | End: 2019-01-13
Payer: COMMERCIAL

## 2019-01-13 VITALS
TEMPERATURE: 98 F | DIASTOLIC BLOOD PRESSURE: 67 MMHG | OXYGEN SATURATION: 96 % | HEART RATE: 94 BPM | RESPIRATION RATE: 16 BRPM | SYSTOLIC BLOOD PRESSURE: 119 MMHG | WEIGHT: 220 LBS | HEIGHT: 68 IN | BODY MASS INDEX: 33.34 KG/M2

## 2019-01-13 DIAGNOSIS — L03.019 ONYCHIA AND PARONYCHIA OF FINGER: Primary | ICD-10-CM

## 2019-01-13 PROCEDURE — 10060 I&D ABSCESS SIMPLE/SINGLE: CPT

## 2019-01-13 PROCEDURE — 99214 OFFICE O/P EST MOD 30 MIN: CPT

## 2019-01-13 PROCEDURE — 99213 OFFICE O/P EST LOW 20 MIN: CPT

## 2019-01-13 RX ORDER — AMOXICILLIN AND CLAVULANATE POTASSIUM 875; 125 MG/1; MG/1
1 TABLET, FILM COATED ORAL 2 TIMES DAILY
Qty: 20 TABLET | Refills: 0 | Status: SHIPPED | OUTPATIENT
Start: 2019-01-13 | End: 2019-01-14

## 2019-01-13 NOTE — ED PROVIDER NOTES
Patient Seen in: [facility]    History   Patient presents with:  Cellulitis (integumentary, infectious)    Stated Complaint: RIGHT RING FINGER INFECTION/SWOLLEN/RED    HPI  82-year-old male with tetanus up-to-date presents with Current:/67   Pulse 94   Temp 97.9 °F (36.6 °C) (Temporal)   Resp 16   Ht 172.7 cm (5' 8\")   Wt 99.8 kg   SpO2 96%   BMI 33.45 kg/m²         Physical Exam   Constitutional: He is oriented to person, place, and time.  He appears well-developed and wel Amoxicillin-Pot Clavulanate 875-125 MG Oral Tab  Take 1 tablet by mouth 2 (two) times daily for 10 days. , Normal, Disp-20 tablet, R-0

## 2019-01-14 ENCOUNTER — TELEPHONE (OUTPATIENT)
Dept: INTERNAL MEDICINE CLINIC | Facility: CLINIC | Age: 24
End: 2019-01-14

## 2019-01-14 DIAGNOSIS — L03.818 CELLULITIS OF OTHER SPECIFIED SITE: Primary | ICD-10-CM

## 2019-01-14 RX ORDER — DOXYCYCLINE HYCLATE 100 MG
100 TABLET ORAL 2 TIMES DAILY
Qty: 20 TABLET | Refills: 0 | Status: SHIPPED | OUTPATIENT
Start: 2019-01-14 | End: 2019-01-14

## 2019-01-14 RX ORDER — SULFAMETHOXAZOLE AND TRIMETHOPRIM 800; 160 MG/1; MG/1
1 TABLET ORAL 2 TIMES DAILY
Qty: 20 TABLET | Refills: 0 | Status: SHIPPED | OUTPATIENT
Start: 2019-01-14 | End: 2019-01-24

## 2019-01-14 NOTE — TELEPHONE ENCOUNTER
[facility] in [place] called and stated there is a drug interaction of the antibiotic that was just prescribed, and it interacts with Clar melanie. Please advise.

## 2019-01-14 NOTE — TELEPHONE ENCOUNTER
Patient called and stated he went to an urgent care yesterday, and was prescribed Augmentin. He took the most rent dose yesterday at 4 pm, and now has diarrhea, nausea, and vomit. Please advise.

## 2019-01-14 NOTE — TELEPHONE ENCOUNTER
Doxycycline and Isotretinoin 40mg:   avoid combo: combo may incr. risk of pseudotumor cerebri, papilledema (additive effects)     Per Dr. Dinorah Stewart, Rx changed Bactrim bid for 10 days. The pharmacy was notified of this. The pt is aware of this change as well.

## 2019-01-14 NOTE — TELEPHONE ENCOUNTER
The pt was seen in the Urgent Care yesterday:    74-year-old male with tetanus up-to-date presents with right fourth digit pain, redness, and swelling surrounding his fingernail. Patient bites his nails. He states the symptoms started a few days ago.   He

## 2019-01-15 ENCOUNTER — OFFICE VISIT (OUTPATIENT)
Dept: INTERNAL MEDICINE CLINIC | Facility: CLINIC | Age: 24
End: 2019-01-15
Payer: COMMERCIAL

## 2019-01-15 VITALS
TEMPERATURE: 98 F | HEART RATE: 104 BPM | WEIGHT: 214 LBS | SYSTOLIC BLOOD PRESSURE: 118 MMHG | DIASTOLIC BLOOD PRESSURE: 80 MMHG | RESPIRATION RATE: 16 BRPM | BODY MASS INDEX: 33.59 KG/M2 | HEIGHT: 67 IN

## 2019-01-15 DIAGNOSIS — J45.20 MILD INTERMITTENT ASTHMA WITHOUT COMPLICATION: ICD-10-CM

## 2019-01-15 DIAGNOSIS — L03.011 PARONYCHIA OF FINGER, RIGHT: Primary | ICD-10-CM

## 2019-01-15 PROCEDURE — 99213 OFFICE O/P EST LOW 20 MIN: CPT | Performed by: NURSE PRACTITIONER

## 2019-01-15 NOTE — PROGRESS NOTES
HPI:    Patient ID: David Boyer is a 26year old male. Patient presents with: Infection: FU cellulitis on the right hand, 4th digit; reports improvement; Rm 4    Asthma   This is a chronic problem. The current episode started more than 1 year ago.  Ada Brandt Other (Heart Murmur) Paternal Grandmother    • Hypertension Maternal Grandfather    • Depression Maternal Grandfather    • Stroke Maternal Grandfather    • Depression Maternal Grandmother    • Asthma Maternal Grandmother    • Hypertension Other         Aun once daily.  Disp:  Rfl: 2     Allergies:  Augmentin [Amoxicil*    DIARRHEA, NAUSEA AND VOMITING  Morphine                RASH    Lab Results   Component Value Date     (H) 12/19/2018    BUN 14 12/19/2018    CREATSERUM 1.04 12/19/2018    GFR 95 04/04 alert and oriented to person, place, and time. Skin: Skin is warm and dry. Lesion (erythema around pointer finger nail bed) noted. Psychiatric: He has a normal mood and affect.             ASSESSMENT/PLAN:   Diagnoses and all orders for this visit:    P

## 2019-02-18 ENCOUNTER — OFFICE VISIT (OUTPATIENT)
Dept: INTERNAL MEDICINE CLINIC | Facility: CLINIC | Age: 24
End: 2019-02-18
Payer: COMMERCIAL

## 2019-02-18 VITALS
DIASTOLIC BLOOD PRESSURE: 76 MMHG | OXYGEN SATURATION: 97 % | HEART RATE: 88 BPM | RESPIRATION RATE: 16 BRPM | TEMPERATURE: 98 F | HEIGHT: 67 IN | WEIGHT: 214 LBS | BODY MASS INDEX: 33.59 KG/M2 | SYSTOLIC BLOOD PRESSURE: 124 MMHG

## 2019-02-18 DIAGNOSIS — B96.89 ACUTE BACTERIAL RHINOSINUSITIS: Primary | ICD-10-CM

## 2019-02-18 DIAGNOSIS — J01.90 ACUTE BACTERIAL RHINOSINUSITIS: Primary | ICD-10-CM

## 2019-02-18 PROCEDURE — 99213 OFFICE O/P EST LOW 20 MIN: CPT | Performed by: NURSE PRACTITIONER

## 2019-02-18 RX ORDER — AZITHROMYCIN 250 MG/1
TABLET, FILM COATED ORAL
Qty: 6 TABLET | Refills: 0 | Status: SHIPPED | OUTPATIENT
Start: 2019-02-18 | End: 2019-03-11 | Stop reason: ALTCHOICE

## 2019-02-18 NOTE — PROGRESS NOTES
HPI:    Patient ID: Kahlil Carreon is a 26year old male. Patient presents with:  Sinus Problem: sinus congestion, runny nose, ear congestion and pain, sneezing x1 week      Sinus Problem   This is a new problem.  The current episode started in the past Disease Mother    • Heart Disease Paternal Grandfather    • Hypertension Paternal Grandfather    • Diabetes Paternal Grandfather    • Depression Paternal Grandmother    • Other (Hyperlipidemia) Paternal Grandmother    • Other (Heart Murmur) Paternal Grandm 10 mg by mouth daily. Disp:  Rfl:    omeprazole 20 MG Oral Capsule Delayed Release Take 20 mg by mouth every morning before breakfast. Disp:  Rfl:    escitalopram 10 MG Oral Tab Take 10 mg by mouth once daily.  Disp:  Rfl: 2     Allergies:  Augmentin [Amoxi 67\"   Wt 214 lb   SpO2 97%   BMI 33.52 kg/m²   Physical Exam   Vitals reviewed. Constitutional: He is oriented to person, place, and time. He appears well-developed and well-nourished.    HENT:   Right Ear: Tympanic membrane and ear canal normal.   Left

## 2019-03-11 ENCOUNTER — OFFICE VISIT (OUTPATIENT)
Dept: INTERNAL MEDICINE CLINIC | Facility: CLINIC | Age: 24
End: 2019-03-11
Payer: COMMERCIAL

## 2019-03-11 VITALS
WEIGHT: 208 LBS | OXYGEN SATURATION: 98 % | HEIGHT: 67 IN | DIASTOLIC BLOOD PRESSURE: 82 MMHG | HEART RATE: 98 BPM | BODY MASS INDEX: 32.65 KG/M2 | TEMPERATURE: 99 F | RESPIRATION RATE: 16 BRPM | SYSTOLIC BLOOD PRESSURE: 126 MMHG

## 2019-03-11 DIAGNOSIS — J01.41 ACUTE RECURRENT PANSINUSITIS: Primary | ICD-10-CM

## 2019-03-11 PROCEDURE — 99213 OFFICE O/P EST LOW 20 MIN: CPT | Performed by: PHYSICIAN ASSISTANT

## 2019-03-11 RX ORDER — CLARITHROMYCIN 500 MG/1
500 TABLET, COATED ORAL 2 TIMES DAILY
Qty: 20 TABLET | Refills: 0 | Status: SHIPPED | OUTPATIENT
Start: 2019-03-11 | End: 2019-04-22

## 2019-03-11 RX ORDER — PREDNISONE 20 MG/1
40 TABLET ORAL DAILY
Qty: 14 TABLET | Refills: 0 | Status: SHIPPED | OUTPATIENT
Start: 2019-03-11 | End: 2019-03-18

## 2019-03-11 RX ORDER — ISOTRETINOIN 20 MG/1
20 CAPSULE ORAL 2 TIMES DAILY
COMMUNITY
End: 2020-01-06

## 2019-03-11 NOTE — PROGRESS NOTES
HPI:   Spring Montero is a 26year old male who presents for upper respiratory symptoms for  7  days. Patient reports sore throat, chest congestion, cough, sinus pressure and pain, headache. Some chest tightness beginning today.  Patient denies Dellar Saunas Grandfather    • Hypertension Paternal Grandfather    • Diabetes Paternal Grandfather    • Depression Paternal Grandmother    • Other (Hyperlipidemia) Paternal Grandmother    • Other (Heart Murmur) Paternal Grandmother    • Hypertension Maternal Grandfathe Prescriptions     Signed Prescriptions Disp Refills   • clarithromycin (BIAXIN) 500 MG Oral Tab 20 tablet 0     Sig: Take 1 tablet (500 mg total) by mouth 2 (two) times daily.    • predniSONE 20 MG Oral Tab 14 tablet 0     Sig: Take 2 tablets (40 mg total)

## 2019-04-22 ENCOUNTER — OFFICE VISIT (OUTPATIENT)
Dept: INTERNAL MEDICINE CLINIC | Facility: CLINIC | Age: 24
End: 2019-04-22
Payer: COMMERCIAL

## 2019-04-22 VITALS
BODY MASS INDEX: 33.27 KG/M2 | RESPIRATION RATE: 16 BRPM | OXYGEN SATURATION: 98 % | TEMPERATURE: 98 F | WEIGHT: 212 LBS | SYSTOLIC BLOOD PRESSURE: 124 MMHG | DIASTOLIC BLOOD PRESSURE: 78 MMHG | HEIGHT: 67 IN | HEART RATE: 82 BPM

## 2019-04-22 DIAGNOSIS — H10.13 ALLERGIC CONJUNCTIVITIS OF BOTH EYES: ICD-10-CM

## 2019-04-22 DIAGNOSIS — B96.89 ACUTE BACTERIAL RHINOSINUSITIS: Primary | ICD-10-CM

## 2019-04-22 DIAGNOSIS — J01.90 ACUTE BACTERIAL RHINOSINUSITIS: Primary | ICD-10-CM

## 2019-04-22 PROCEDURE — 99213 OFFICE O/P EST LOW 20 MIN: CPT | Performed by: NURSE PRACTITIONER

## 2019-04-22 RX ORDER — PREDNISONE 20 MG/1
40 TABLET ORAL DAILY
Qty: 14 TABLET | Refills: 0 | Status: SHIPPED | OUTPATIENT
Start: 2019-04-22 | End: 2019-04-29

## 2019-04-22 RX ORDER — CLARITHROMYCIN 500 MG/1
500 TABLET, COATED ORAL 2 TIMES DAILY
Qty: 20 TABLET | Refills: 0 | Status: SHIPPED | OUTPATIENT
Start: 2019-04-22 | End: 2019-07-02

## 2019-04-22 RX ORDER — OLOPATADINE HYDROCHLORIDE 2 MG/ML
1 SOLUTION/ DROPS OPHTHALMIC EVERY MORNING
Qty: 3 BOTTLE | Refills: 2 | Status: SHIPPED | OUTPATIENT
Start: 2019-04-22 | End: 2022-01-11 | Stop reason: ALTCHOICE

## 2019-04-22 NOTE — PROGRESS NOTES
HPI:    Patient ID: Kimberlyn Chun is a 26year old male. Patient presents with:  Sinus Problem: x3-4 days--sinus congestion, clogged ears, mild cough      Sinusitis   This is a new problem. The current episode started in the past 7 days.  The problem ha Heart Disease Mother    • Heart Disease Paternal Grandfather    • Hypertension Paternal Grandfather    • Diabetes Paternal Grandfather    • Depression Paternal Grandmother    • Other (Hyperlipidemia) Paternal Grandmother    • Other (Heart Murmur) Paternal Oral Tab Take 50 mg by mouth 2 (two) times daily. Disp:  Rfl:    loratadine 10 MG Oral Tab Take 10 mg by mouth daily.  Disp:  Rfl:    omeprazole 20 MG Oral Capsule Delayed Release Take 20 mg by mouth every morning before breakfast. Disp:  Rfl:    escitalopr PHYSICAL EXAM:   /78   Pulse 82   Temp 98.1 °F (36.7 °C) (Oral)   Resp 16   Ht 67\"   Wt 212 lb   SpO2 98%   BMI 33.20 kg/m²   Physical Exam   Vitals reviewed. Constitutional: He is oriented to person, place, and time.  He appears well-developed a Instructions   Increase water intake  Start zyrtec at night to help with allergies        TANVI Jansen

## 2019-07-02 ENCOUNTER — OFFICE VISIT (OUTPATIENT)
Dept: INTERNAL MEDICINE CLINIC | Facility: CLINIC | Age: 24
End: 2019-07-02
Payer: COMMERCIAL

## 2019-07-02 ENCOUNTER — LAB ENCOUNTER (OUTPATIENT)
Dept: LAB | Age: 24
End: 2019-07-02
Attending: PHYSICIAN ASSISTANT
Payer: COMMERCIAL

## 2019-07-02 ENCOUNTER — TELEPHONE (OUTPATIENT)
Dept: INTERNAL MEDICINE CLINIC | Facility: CLINIC | Age: 24
End: 2019-07-02

## 2019-07-02 VITALS
DIASTOLIC BLOOD PRESSURE: 72 MMHG | SYSTOLIC BLOOD PRESSURE: 124 MMHG | WEIGHT: 212 LBS | RESPIRATION RATE: 16 BRPM | TEMPERATURE: 99 F | BODY MASS INDEX: 33.27 KG/M2 | OXYGEN SATURATION: 97 % | HEIGHT: 67 IN | HEART RATE: 88 BPM

## 2019-07-02 DIAGNOSIS — Z00.00 LABORATORY EXAMINATION ORDERED AS PART OF A ROUTINE GENERAL MEDICAL EXAMINATION: ICD-10-CM

## 2019-07-02 DIAGNOSIS — R30.0 DYSURIA: ICD-10-CM

## 2019-07-02 DIAGNOSIS — Z00.00 ROUTINE PHYSICAL EXAMINATION: Primary | ICD-10-CM

## 2019-07-02 DIAGNOSIS — Z23 NEED FOR VACCINATION: ICD-10-CM

## 2019-07-02 PROBLEM — F51.01 PRIMARY INSOMNIA: Status: RESOLVED | Noted: 2018-06-24 | Resolved: 2019-07-02

## 2019-07-02 PROBLEM — T50.905A WEIGHT GAIN DUE TO MEDICATION: Status: RESOLVED | Noted: 2017-02-17 | Resolved: 2019-07-02

## 2019-07-02 PROBLEM — R63.5 WEIGHT GAIN DUE TO MEDICATION: Status: RESOLVED | Noted: 2017-02-17 | Resolved: 2019-07-02

## 2019-07-02 LAB
ALBUMIN SERPL-MCNC: 4 G/DL (ref 3.4–5)
ALBUMIN/GLOB SERPL: 1.3 {RATIO} (ref 1–2)
ALP LIVER SERPL-CCNC: 87 U/L (ref 45–117)
ALT SERPL-CCNC: 22 U/L (ref 16–61)
ANION GAP SERPL CALC-SCNC: 7 MMOL/L (ref 0–18)
AST SERPL-CCNC: 16 U/L (ref 15–37)
BASOPHILS # BLD AUTO: 0.04 X10(3) UL (ref 0–0.2)
BASOPHILS NFR BLD AUTO: 0.6 %
BILIRUB SERPL-MCNC: 0.6 MG/DL (ref 0.1–2)
BILIRUB UR QL STRIP.AUTO: NEGATIVE
BUN BLD-MCNC: 13 MG/DL (ref 7–18)
BUN/CREAT SERPL: 14.8 (ref 10–20)
CALCIUM BLD-MCNC: 9.1 MG/DL (ref 8.5–10.1)
CHLORIDE SERPL-SCNC: 107 MMOL/L (ref 98–112)
CHOLEST SMN-MCNC: 184 MG/DL (ref ?–200)
CO2 SERPL-SCNC: 27 MMOL/L (ref 21–32)
COLOR UR AUTO: YELLOW
CREAT BLD-MCNC: 0.88 MG/DL (ref 0.7–1.3)
DEPRECATED RDW RBC AUTO: 44.3 FL (ref 35.1–46.3)
EOSINOPHIL # BLD AUTO: 0.16 X10(3) UL (ref 0–0.7)
EOSINOPHIL NFR BLD AUTO: 2.3 %
ERYTHROCYTE [DISTWIDTH] IN BLOOD BY AUTOMATED COUNT: 13.5 % (ref 11–15)
EST. AVERAGE GLUCOSE BLD GHB EST-MCNC: 117 MG/DL (ref 68–126)
GLOBULIN PLAS-MCNC: 3.2 G/DL (ref 2.8–4.4)
GLUCOSE BLD-MCNC: 92 MG/DL (ref 70–99)
GLUCOSE UR STRIP.AUTO-MCNC: NEGATIVE MG/DL
HBA1C MFR BLD HPLC: 5.7 % (ref ?–5.7)
HCT VFR BLD AUTO: 42.3 % (ref 39–53)
HDLC SERPL-MCNC: 40 MG/DL (ref 40–59)
HGB BLD-MCNC: 14.3 G/DL (ref 13–17.5)
IMM GRANULOCYTES # BLD AUTO: 0.01 X10(3) UL (ref 0–1)
IMM GRANULOCYTES NFR BLD: 0.1 %
KETONES UR STRIP.AUTO-MCNC: NEGATIVE MG/DL
LDLC SERPL CALC-MCNC: 120 MG/DL (ref ?–100)
LYMPHOCYTES # BLD AUTO: 2.99 X10(3) UL (ref 1–4)
LYMPHOCYTES NFR BLD AUTO: 43 %
M PROTEIN MFR SERPL ELPH: 7.2 G/DL (ref 6.4–8.2)
MCH RBC QN AUTO: 30.1 PG (ref 26–34)
MCHC RBC AUTO-ENTMCNC: 33.8 G/DL (ref 31–37)
MCV RBC AUTO: 89.1 FL (ref 80–100)
MONOCYTES # BLD AUTO: 0.6 X10(3) UL (ref 0.1–1)
MONOCYTES NFR BLD AUTO: 8.6 %
NEUTROPHILS # BLD AUTO: 3.15 X10 (3) UL (ref 1.5–7.7)
NEUTROPHILS # BLD AUTO: 3.15 X10(3) UL (ref 1.5–7.7)
NEUTROPHILS NFR BLD AUTO: 45.4 %
NITRITE UR QL STRIP.AUTO: NEGATIVE
NONHDLC SERPL-MCNC: 144 MG/DL (ref ?–130)
OSMOLALITY SERPL CALC.SUM OF ELEC: 292 MOSM/KG (ref 275–295)
PH UR STRIP.AUTO: 6 [PH] (ref 4.5–8)
PLATELET # BLD AUTO: 152 10(3)UL (ref 150–450)
POTASSIUM SERPL-SCNC: 4.1 MMOL/L (ref 3.5–5.1)
PROT UR STRIP.AUTO-MCNC: NEGATIVE MG/DL
RBC # BLD AUTO: 4.75 X10(6)UL (ref 4.3–5.7)
RBC UR QL AUTO: NEGATIVE
SODIUM SERPL-SCNC: 141 MMOL/L (ref 136–145)
SP GR UR STRIP.AUTO: 1.02 (ref 1–1.03)
TRIGL SERPL-MCNC: 118 MG/DL (ref 30–149)
TSI SER-ACNC: 2.4 MIU/ML (ref 0.36–3.74)
UROBILINOGEN UR STRIP.AUTO-MCNC: <2 MG/DL
VLDLC SERPL CALC-MCNC: 24 MG/DL (ref 0–30)
WBC # BLD AUTO: 7 X10(3) UL (ref 4–11)

## 2019-07-02 PROCEDURE — 80050 GENERAL HEALTH PANEL: CPT | Performed by: PHYSICIAN ASSISTANT

## 2019-07-02 PROCEDURE — 83036 HEMOGLOBIN GLYCOSYLATED A1C: CPT | Performed by: PHYSICIAN ASSISTANT

## 2019-07-02 PROCEDURE — 90651 9VHPV VACCINE 2/3 DOSE IM: CPT | Performed by: PHYSICIAN ASSISTANT

## 2019-07-02 PROCEDURE — 81001 URINALYSIS AUTO W/SCOPE: CPT | Performed by: PHYSICIAN ASSISTANT

## 2019-07-02 PROCEDURE — 87086 URINE CULTURE/COLONY COUNT: CPT | Performed by: PHYSICIAN ASSISTANT

## 2019-07-02 PROCEDURE — 99395 PREV VISIT EST AGE 18-39: CPT | Performed by: PHYSICIAN ASSISTANT

## 2019-07-02 PROCEDURE — 80061 LIPID PANEL: CPT | Performed by: PHYSICIAN ASSISTANT

## 2019-07-02 PROCEDURE — 36415 COLL VENOUS BLD VENIPUNCTURE: CPT | Performed by: PHYSICIAN ASSISTANT

## 2019-07-02 PROCEDURE — 90471 IMMUNIZATION ADMIN: CPT | Performed by: PHYSICIAN ASSISTANT

## 2019-07-02 RX ORDER — SULFAMETHOXAZOLE AND TRIMETHOPRIM 800; 160 MG/1; MG/1
1 TABLET ORAL 2 TIMES DAILY
Qty: 14 TABLET | Refills: 0 | Status: SHIPPED | OUTPATIENT
Start: 2019-07-02 | End: 2019-09-03

## 2019-07-02 NOTE — TELEPHONE ENCOUNTER
Clemens Shone asked pt at his last visit if melanoma runs in his family and pt is calling back to say yes, but he also states he does see derm at least 2x yearly for acne, does she want him to see derm more than 2x yearly for this

## 2019-07-02 NOTE — PATIENT INSTRUCTIONS
Continue healthy lifestyle  We will forward your culture results; if any symptoms return (such as burning with urination, urgency, frequency) please start the bactrim as directed.

## 2019-07-02 NOTE — PROGRESS NOTES
Wellness Exam    CC: Patient is presenting for a wellness exam    HPI:   Current Complaints: lab review  + leuks, RBC's on UA. C/o some intermittent dysuria. Denies flank pain, fever, chills, myalgias, nocturia or slow stream. No symptoms today.  Denies new Years:  3.50        Quit date: 1/11/2016        Years since quitting: 3.4      Smokeless tobacco: Never Used      Tobacco comment: electronic cigarettes    Alcohol use: No      Alcohol/week: 0.0 oz    Drug use: No      Current Outpatient Medications on File bruise/bleed easily. Psychiatric/Behavioral: Negative for confusion and agitation. The patient is not nervous/anxious.       /72   Pulse 88   Temp 98.9 °F (37.2 °C) (Oral)   Resp 16   Ht 67\"   Wt 212 lb   SpO2 97%   BMI 33.20 kg/m²   Physical Exam physical, or sooner prn. gardasil #2 in 2 months   Patient/Caregiver Education:  Patient/Caregiver Education: There are no barriers to learning. Medical education done. Outcome: Patient verbalizes understanding.       Educated by: [place]

## 2019-07-19 DIAGNOSIS — IMO0001 MODERATE INTERMITTENT ASTHMA, WITH ACUTE EXACERBATION: ICD-10-CM

## 2019-07-19 RX ORDER — MONTELUKAST SODIUM 10 MG/1
TABLET ORAL
Qty: 90 TABLET | Refills: 2 | Status: SHIPPED | OUTPATIENT
Start: 2019-07-19 | End: 2020-04-15

## 2019-10-03 ENCOUNTER — TELEPHONE (OUTPATIENT)
Dept: INTERNAL MEDICINE CLINIC | Facility: CLINIC | Age: 24
End: 2019-10-03

## 2019-10-03 NOTE — TELEPHONE ENCOUNTER
[facility] send a record for Dr. Desi Lynn for a date patient had the flu shot. Update records. Put in Triage Room.

## 2019-11-09 ENCOUNTER — HOSPITAL ENCOUNTER (EMERGENCY)
Facility: HOSPITAL | Age: 24
Discharge: HOME | End: 2019-11-09
Attending: EMERGENCY MEDICINE
Payer: COMMERCIAL

## 2019-11-09 VITALS
SYSTOLIC BLOOD PRESSURE: 119 MMHG | TEMPERATURE: 98 F | WEIGHT: 198 LBS | HEART RATE: 75 BPM | RESPIRATION RATE: 16 BRPM | OXYGEN SATURATION: 98 % | BODY MASS INDEX: 31 KG/M2 | DIASTOLIC BLOOD PRESSURE: 79 MMHG

## 2019-11-09 DIAGNOSIS — K52.9 GASTROENTERITIS: Primary | ICD-10-CM

## 2019-11-09 PROCEDURE — 99284 EMERGENCY DEPT VISIT MOD MDM: CPT

## 2019-11-09 PROCEDURE — 96361 HYDRATE IV INFUSION ADD-ON: CPT

## 2019-11-09 PROCEDURE — 96374 THER/PROPH/DIAG INJ IV PUSH: CPT

## 2019-11-09 PROCEDURE — 80053 COMPREHEN METABOLIC PANEL: CPT | Performed by: EMERGENCY MEDICINE

## 2019-11-09 PROCEDURE — 87086 URINE CULTURE/COLONY COUNT: CPT | Performed by: EMERGENCY MEDICINE

## 2019-11-09 PROCEDURE — 85025 COMPLETE CBC W/AUTO DIFF WBC: CPT | Performed by: EMERGENCY MEDICINE

## 2019-11-09 PROCEDURE — 81001 URINALYSIS AUTO W/SCOPE: CPT | Performed by: EMERGENCY MEDICINE

## 2019-11-09 PROCEDURE — 83690 ASSAY OF LIPASE: CPT | Performed by: EMERGENCY MEDICINE

## 2019-11-09 RX ORDER — ONDANSETRON 4 MG/1
4 TABLET, ORALLY DISINTEGRATING ORAL EVERY 4 HOURS PRN
Qty: 10 TABLET | Refills: 0 | Status: SHIPPED | OUTPATIENT
Start: 2019-11-09 | End: 2019-11-11

## 2019-11-09 RX ORDER — ONDANSETRON 2 MG/ML
4 INJECTION INTRAMUSCULAR; INTRAVENOUS ONCE
Status: COMPLETED | OUTPATIENT
Start: 2019-11-09 | End: 2019-11-09

## 2019-11-09 NOTE — ED PROVIDER NOTES
Patient Seen in: [facility] Emergency Department      History   Patient presents with:  Nausea/Vomiting/Diarrhea (gastrointestinal)    Stated Complaint:     HPI    Patient is a pleasant 74-year-old male, presenting for evaluation of vomiting and makenna Pulse 75   Temp 98 °F (36.7 °C) (Temporal)   Resp 16   Wt 89.8 kg   SpO2 98%   BMI 31.01 kg/m²         Physical Exam    Vital signs noted. GENERAL: Patient is awake and alert, sitting upright on the cart, intermittent retching.    HEENT: Head is without DIFFERENTIAL WITH PLATELET.   Procedure                               Abnormality         Status                     ---------                               -----------         ------                     CBC W/ DIFFERENTIAL[[id number]]          Abnormal Prescribed:  Current Discharge Medication List    START taking these medications    ondansetron 4 MG Oral Tablet Dispersible  Take 1 tablet (4 mg total) by mouth every 4 (four) hours as needed for Nausea.   Qty: 10 tablet Refills: 0

## 2019-11-09 NOTE — ED INITIAL ASSESSMENT (HPI)
Per patient, abdominal pain upper quadrants 3/10 he believes is from vomiting. Nausea with 10 episodes of vomiting today. AAOX 3.

## 2019-11-11 ENCOUNTER — TELEPHONE (OUTPATIENT)
Dept: INTERNAL MEDICINE CLINIC | Facility: CLINIC | Age: 24
End: 2019-11-11

## 2019-11-11 DIAGNOSIS — K52.9 GASTROENTERITIS: Primary | ICD-10-CM

## 2019-11-11 RX ORDER — ONDANSETRON 4 MG/1
4 TABLET, ORALLY DISINTEGRATING ORAL EVERY 8 HOURS PRN
Qty: 30 TABLET | Refills: 0 | Status: SHIPPED | OUTPATIENT
Start: 2019-11-11 | End: 2019-11-21

## 2019-11-11 NOTE — TELEPHONE ENCOUNTER
Pt states no change since ed visit. Ed dx was Gastroenteritis . Pt states continued naus/voming. No diarrhea due to no appetite . No current fever. Continued body aches and chills. continued stomach cramping.     Zofran q4h to decrease nasu/vom

## 2019-11-11 NOTE — TELEPHONE ENCOUNTER
[facility]  [place]   Ph: [phone]  Fx: [phone]      Alternative Rx.  Requested:   ondansetron 4 MG Oral Tablet Dispersible    Prior Auth requred for full qty - call [phone]    Fax in triage

## 2019-12-02 NOTE — DIETARY NOTE
[facility] ASSESSMENT    Pt does not meet malnutrition criteria.     NUTRITION DIAGNOSIS/PROBLEM:    Inadequate oral intake related to altered GI function as evidenced by NPO with necrotic pancreatitis & need for NJ feeds - bry per MD discretion    MONITOR AND EVALUATE/NUTRITION GOALS:    3. No signs of skin breakdown  4. Maintain lean body mass  5. Tolerance of enteral nutrition without signs/symptoms of intolerance (abdominal discomfort/distention)  6.  Alternative means of nutr 02-Dec-2019 05:00

## 2020-01-06 ENCOUNTER — TELEPHONE (OUTPATIENT)
Dept: INTERNAL MEDICINE CLINIC | Facility: CLINIC | Age: 25
End: 2020-01-06

## 2020-01-06 DIAGNOSIS — J01.90 ACUTE SINUSITIS, RECURRENCE NOT SPECIFIED, UNSPECIFIED LOCATION: Primary | ICD-10-CM

## 2020-01-06 RX ORDER — DOXYCYCLINE HYCLATE 100 MG
100 TABLET ORAL 2 TIMES DAILY
Qty: 20 TABLET | Refills: 0 | Status: SHIPPED | OUTPATIENT
Start: 2020-01-06 | End: 2020-01-20

## 2020-01-06 NOTE — TELEPHONE ENCOUNTER
Pt states since 1/4/2020 sinus and ear pressure have presented, with clear nasal drainage, sore throat and wet productive cough. managing at home with pseudoephedrine  and mucinex     Per elli pt is ok to start doxycycline 100 mg BID for 10 days.      Pt v

## 2020-01-23 ENCOUNTER — TELEPHONE (OUTPATIENT)
Dept: INTERNAL MEDICINE CLINIC | Facility: CLINIC | Age: 25
End: 2020-01-23

## 2020-01-23 RX ORDER — PREDNISONE 20 MG/1
40 TABLET ORAL DAILY
Qty: 14 TABLET | Refills: 0 | Status: SHIPPED | OUTPATIENT
Start: 2020-01-23 | End: 2020-01-30

## 2020-01-23 NOTE — TELEPHONE ENCOUNTER
Pt was in for a sinus infection the 2nd round of medicine isnt helping much. Too much pressure in his head. Could he possibly get steroids to help? Please call to discuss options.

## 2020-01-23 NOTE — TELEPHONE ENCOUNTER
Dear Dr. Sharad Hyde and Staff,     I am currently on my second round of antibiotics as prescribed but I am still feeling very sick.  My head is experiencing an immense amount of pressure and my ears and behind my eyes are hurting and I havent been able to sleep v

## 2020-01-23 NOTE — TELEPHONE ENCOUNTER
Per Dr. Emily Grajeda treat with Prednisone 20 mg, 2 tablets (40 mg) daily for 7 days. Discussed with patient results and recommendations. Understanding expressed. Patient also reminded to make an appointment to see ENT. Orders placed.

## 2020-01-27 ENCOUNTER — OFFICE VISIT (OUTPATIENT)
Dept: INTERNAL MEDICINE CLINIC | Facility: CLINIC | Age: 25
End: 2020-01-27
Payer: COMMERCIAL

## 2020-01-27 VITALS
SYSTOLIC BLOOD PRESSURE: 118 MMHG | TEMPERATURE: 98 F | HEIGHT: 67 IN | RESPIRATION RATE: 16 BRPM | BODY MASS INDEX: 32.18 KG/M2 | WEIGHT: 205 LBS | DIASTOLIC BLOOD PRESSURE: 72 MMHG | HEART RATE: 63 BPM | OXYGEN SATURATION: 98 %

## 2020-01-27 DIAGNOSIS — S29.012A STRAIN OF RHOMBOID MUSCLE, INITIAL ENCOUNTER: Primary | ICD-10-CM

## 2020-01-27 PROCEDURE — 99214 OFFICE O/P EST MOD 30 MIN: CPT | Performed by: INTERNAL MEDICINE

## 2020-01-27 RX ORDER — CYCLOBENZAPRINE HCL 10 MG
10 TABLET ORAL NIGHTLY
Qty: 5 TABLET | Refills: 0 | Status: SHIPPED | OUTPATIENT
Start: 2020-01-27 | End: 2020-02-16

## 2020-01-27 NOTE — PROGRESS NOTES
HPI:    Patient ID: Amy Knowles is a 27year old male. Shoulder Pain    Pain location: right upper shoulder /rhomboid. This is a new problem. The current episode started 1 to 4 weeks ago. There has been no history of extremity trauma.  The problem occ • loratadine 10 MG Oral Tab Take 10 mg by mouth daily. • omeprazole 20 MG Oral Capsule Delayed Release Take 20 mg by mouth every morning before breakfast.     • escitalopram 10 MG Oral Tab Take 10 mg by mouth once daily.   2   • AUVI-Q 0.3 MG/0.3ML Inje

## 2020-01-27 NOTE — PATIENT INSTRUCTIONS
Back Sprain or Strain    Injury to the muscles (strain) or ligaments (sprain) around the spine can be troubling.  Injury may occur after a sudden forceful twisting or bending such as in a car accident, after a simple awkward movement, or after lifting komal · You can alternate the ice and heat. Talk with your healthcare provider to find out the best treatment or therapy for your back pain. · Therapeutic massage can help relax the back muscles without stretching them. · Be aware of safe lifting methods.  Alena Cardoso Call your healthcare provider right away if any of the following occur:  · Pain gets worse or spreads to your arms or legs  · Weakness or numbness in one or both arms or legs  · Numbness in the groin or genital area  Date Last Reviewed: 6/1/2016 © 2000-20

## 2020-04-15 DIAGNOSIS — IMO0001 MODERATE INTERMITTENT ASTHMA, WITH ACUTE EXACERBATION: ICD-10-CM

## 2020-04-15 RX ORDER — MONTELUKAST SODIUM 10 MG/1
TABLET ORAL
Qty: 90 TABLET | Refills: 1 | Status: SHIPPED | OUTPATIENT
Start: 2020-04-15

## 2020-05-26 ENCOUNTER — OFFICE VISIT (OUTPATIENT)
Dept: PODIATRY CLINIC | Facility: CLINIC | Age: 25
End: 2020-05-26
Payer: COMMERCIAL

## 2020-05-26 DIAGNOSIS — B07.0 VERRUCA PLANTARIS: Primary | ICD-10-CM

## 2020-05-26 PROCEDURE — 99203 OFFICE O/P NEW LOW 30 MIN: CPT | Performed by: PODIATRIST

## 2020-05-26 NOTE — PROGRESS NOTES
Mary Ramirez is a 27year old male. Patient presents with:  New Patient: Patient notice a wart on the bottom of right foot last Wednesday - he bought Dr Tiana Khan, but he doesn't think its working - pain scale 5/10 - denies taking pain medication. at [facility] ENDOSCOPY   • OTHER SURGICAL HISTORY  08/18/2017    balloon sinuplasty   • TONSILLECTOMY     • UPPER GI ENDOSCOPY - REFERRAL        Family History   Problem Relation Age of Onset   • Depression Father    • Anxiety Father    • Hypertension Father    • pain      EXAM:   There were no vitals taken for this visit. Physical Exam  GENERAL: well developed, well nourished, in no apparent distress  EXTREMITIES:   1. Integument: Normal skin temperature and turgor  2.  Vascular: Dorsalis pedis two out of four matt

## 2020-06-25 ENCOUNTER — OFFICE VISIT (OUTPATIENT)
Dept: PODIATRY CLINIC | Facility: CLINIC | Age: 25
End: 2020-06-25
Payer: COMMERCIAL

## 2020-06-25 VITALS — TEMPERATURE: 98 F

## 2020-06-25 DIAGNOSIS — B07.0 VERRUCA PLANTARIS: Primary | ICD-10-CM

## 2020-06-25 PROCEDURE — 99213 OFFICE O/P EST LOW 20 MIN: CPT | Performed by: PODIATRIST

## 2020-06-25 NOTE — PROGRESS NOTES
Amy Knowles is a 23year old male. Patient presents with: Follow - Up: Patient here for further evaluation concerning wart on right foot.  Patient last seen in office on 5/6/20        HPI:     Presents today for further evaluation concerning the wart on Problem Relation Age of Onset   • Depression Father    • Anxiety Father    • Hypertension Father    • Diabetes Father    • Hypertension Mother    • Depression Mother    • Asthma Mother    • Heart Disease Mother    • Heart Disease Paternal Grandfather Integument: Normal skin temperature and turgor  2.  Vascular: Dorsalis pedis two out of four bilateral and posterior tibial pulses two out of   four bilateral, capillary refill normal.   3. Musculoskeletal: All muscle groups are graded 5 out of 5 in the celia

## 2020-09-28 ENCOUNTER — TELEPHONE (OUTPATIENT)
Dept: INTERNAL MEDICINE CLINIC | Facility: CLINIC | Age: 25
End: 2020-09-28

## 2020-09-28 DIAGNOSIS — Z20.822 EXPOSURE TO COVID-19 VIRUS: Primary | ICD-10-CM

## 2020-09-28 NOTE — TELEPHONE ENCOUNTER
Patient's mother Olivia Arce per JULIEN) informed me they have someone staying at their house who tested postivie on 9/25/2020 for COVID 19. Patient is asymptomatic at this time and requesting testing.       D/w pt's mother the following will need to be met before p

## 2020-09-28 NOTE — TELEPHONE ENCOUNTER
Patient's mom called, said they have had someone staying at their house who tested positive for covid on 9/25 - she is asking that the patient get a covid test also; he does not currently have any symptoms. Please call susie once order is in.

## 2020-09-29 ENCOUNTER — APPOINTMENT (OUTPATIENT)
Dept: LAB | Age: 25
End: 2020-09-29
Attending: INTERNAL MEDICINE
Payer: COMMERCIAL

## 2020-09-29 DIAGNOSIS — Z20.822 EXPOSURE TO COVID-19 VIRUS: ICD-10-CM

## 2020-10-13 ENCOUNTER — APPOINTMENT (OUTPATIENT)
Dept: GENERAL RADIOLOGY | Facility: HOSPITAL | Age: 25
End: 2020-10-13
Attending: EMERGENCY MEDICINE
Payer: COMMERCIAL

## 2020-10-13 ENCOUNTER — HOSPITAL ENCOUNTER (EMERGENCY)
Facility: HOSPITAL | Age: 25
Discharge: HOME | End: 2020-10-13
Attending: EMERGENCY MEDICINE
Payer: COMMERCIAL

## 2020-10-13 ENCOUNTER — TELEPHONE (OUTPATIENT)
Dept: ORTHOPEDICS CLINIC | Facility: CLINIC | Age: 25
End: 2020-10-13

## 2020-10-13 VITALS
BODY MASS INDEX: 30.31 KG/M2 | OXYGEN SATURATION: 96 % | WEIGHT: 200 LBS | HEART RATE: 80 BPM | SYSTOLIC BLOOD PRESSURE: 119 MMHG | RESPIRATION RATE: 16 BRPM | HEIGHT: 68 IN | DIASTOLIC BLOOD PRESSURE: 83 MMHG

## 2020-10-13 DIAGNOSIS — S62.346A CLOSED NONDISPLACED FRACTURE OF BASE OF FIFTH METACARPAL BONE OF RIGHT HAND, INITIAL ENCOUNTER: Primary | ICD-10-CM

## 2020-10-13 PROCEDURE — 99283 EMERGENCY DEPT VISIT LOW MDM: CPT

## 2020-10-13 PROCEDURE — 73130 X-RAY EXAM OF HAND: CPT | Performed by: EMERGENCY MEDICINE

## 2020-10-13 PROCEDURE — 99284 EMERGENCY DEPT VISIT MOD MDM: CPT

## 2020-10-13 RX ORDER — IBUPROFEN 800 MG/1
800 TABLET ORAL ONCE
Status: COMPLETED | OUTPATIENT
Start: 2020-10-13 | End: 2020-10-13

## 2020-10-13 NOTE — ED PROVIDER NOTES
Patient Seen in: [facility] Emergency Department      History   Patient presents with:  Arm or Hand Injury    Stated Complaint: right hand pain ~ reports went to hit dump out air conditioner and it fell on h*    HPI    Patient is a 27-year-old who s Neck is supple with no pain to movement or palpation. CHEST: Patient is breathing comfortably. Lungs are clear to auscultation bilaterally. HEART: Regular rate and rhythm,, no  murmurs.   ABDOMEN: Soft, nontender, nondistended,   EXTREMITIES: Periphe hand, initial encounter  (primary encounter diagnosis)    Disposition:  Discharge  10/13/2020 12:16 pm    Follow-up:  Mandi Toledo MD  [phone] [id number] [phone]    Schedule an appointment as soon as possible f

## 2020-10-13 NOTE — TELEPHONE ENCOUNTER
Patient's mother called in to set up an appt with Dr. Corbin Mcardle. Per mother, pt has a RT hand fx, xrays @ [facility] and patient is currently being put in a splint.     Patient's mother can be contacted @ [phone]

## 2020-10-14 ENCOUNTER — OFFICE VISIT (OUTPATIENT)
Dept: ORTHOPEDICS CLINIC | Facility: CLINIC | Age: 25
End: 2020-10-14
Payer: COMMERCIAL

## 2020-10-14 VITALS — HEART RATE: 79 BPM | OXYGEN SATURATION: 98 %

## 2020-10-14 DIAGNOSIS — S62.346A CLOSED NONDISPLACED FRACTURE OF BASE OF FIFTH METACARPAL BONE OF RIGHT HAND, INITIAL ENCOUNTER: Primary | ICD-10-CM

## 2020-10-14 PROCEDURE — 99203 OFFICE O/P NEW LOW 30 MIN: CPT | Performed by: ORTHOPAEDIC SURGERY

## 2020-10-14 PROCEDURE — 26600 TREAT METACARPAL FRACTURE: CPT | Performed by: ORTHOPAEDIC SURGERY

## 2020-10-14 NOTE — H&P (VIEW-ONLY)
[facility] New Patient Note    CC: Right fifth metacarpal fracture    DOI: 10/12/2020    HPI: This 23year old right-hand-dominant male presents today with metacarpal fracture of his right hand.   His air conditioning unit fell onto his right hand Mon • omeprazole 20 MG Oral Capsule Delayed Release Take 20 mg by mouth every morning before breakfast.     • escitalopram 10 MG Oral Tab Take 10 mg by mouth once daily.   2       Augmentin [Amoxicil*    DIARRHEA, NAUSEA AND VOMITING  Morphine                RA Genitourinary: Negative for dysuria, frequency and urgency. Musculoskeletal: Negative for myalgias, neck pain and neck stiffness. Skin: Negative for color change, rash and wound. Allergic/Immunologic: Negative for immunocompromised state.    Neurologi 46-year-old male right-hand-dominant with a right fifth metacarpal base fracture with comminution and intra-articular extension with minimal displacement. This fracture pattern is amenable to closed treatment.   I discussed that patient may develop arthrit

## 2020-10-14 NOTE — H&P
[facility] New Patient Note    CC: Right fifth metacarpal fracture    DOI: 10/12/2020    HPI: This 23year old right-hand-dominant male presents today with metacarpal fracture of his right hand.   His air conditioning unit fell onto his right hand Mon • omeprazole 20 MG Oral Capsule Delayed Release Take 20 mg by mouth every morning before breakfast.     • escitalopram 10 MG Oral Tab Take 10 mg by mouth once daily.   2       Augmentin [Amoxicil*    DIARRHEA, NAUSEA AND VOMITING  Morphine                RA Genitourinary: Negative for dysuria, frequency and urgency. Musculoskeletal: Negative for myalgias, neck pain and neck stiffness. Skin: Negative for color change, rash and wound. Allergic/Immunologic: Negative for immunocompromised state.    Neurologi 74-year-old male right-hand-dominant with a right fifth metacarpal base fracture with comminution and intra-articular extension with minimal displacement. This fracture pattern is amenable to closed treatment.   I discussed that patient may develop arthrit

## 2020-10-21 ENCOUNTER — HOSPITAL ENCOUNTER (OUTPATIENT)
Dept: GENERAL RADIOLOGY | Age: 25
Discharge: HOME | End: 2020-10-21
Attending: ORTHOPAEDIC SURGERY
Payer: COMMERCIAL

## 2020-10-21 ENCOUNTER — OFFICE VISIT (OUTPATIENT)
Dept: ORTHOPEDICS CLINIC | Facility: CLINIC | Age: 25
End: 2020-10-21
Payer: COMMERCIAL

## 2020-10-21 VITALS — HEART RATE: 77 BPM | BODY MASS INDEX: 30 KG/M2 | RESPIRATION RATE: 16 BRPM | HEIGHT: 68 IN | OXYGEN SATURATION: 99 %

## 2020-10-21 DIAGNOSIS — S62.316G CLOSED DISPLACED FRACTURE OF BASE OF FIFTH METACARPAL BONE OF RIGHT HAND WITH DELAYED HEALING, SUBSEQUENT ENCOUNTER: Primary | ICD-10-CM

## 2020-10-21 DIAGNOSIS — S62.316G CLOSED DISPLACED FRACTURE OF BASE OF FIFTH METACARPAL BONE OF RIGHT HAND WITH DELAYED HEALING, SUBSEQUENT ENCOUNTER: ICD-10-CM

## 2020-10-21 PROCEDURE — 73130 X-RAY EXAM OF HAND: CPT | Performed by: ORTHOPAEDIC SURGERY

## 2020-10-21 PROCEDURE — 99024 POSTOP FOLLOW-UP VISIT: CPT | Performed by: ORTHOPAEDIC SURGERY

## 2020-10-21 NOTE — PROGRESS NOTES
[facility] New Patient Note    CC: Right fifth metacarpal fracture    DOI: 10/12/2020    HPI: This 23year old right-hand-dominant male presents today with metacarpal fracture of his right hand.   His air conditioning unit fell onto his right hand Mon • Olopatadine HCl (PATADAY) 0.2 % Ophthalmic Solution Place 1 drop into both eyes every morning. 3 Bottle 2   • Albuterol Sulfate  (90 Base) MCG/ACT Inhalation Aero Soln Inhale 1 puff into the lungs every 6 (six) hours as needed for Wheezing.      • HENT: Negative for congestion, dental problem and trouble swallowing. Eyes: Negative for photophobia, pain and visual disturbance. Respiratory: Negative for cough, shortness of breath and wheezing.     Cardiovascular: Negative for chest pain, palpitati Right upper extremity: Skin intact. No visible ecchymosis noted. Tenderness to palpation at the base of the right fifth metacarpal.  No gross deformities noted. No rotational deformities noted. Extensor and flexor tendons intact.   Sensation is intact o

## 2020-10-28 ENCOUNTER — HOSPITAL ENCOUNTER (OUTPATIENT)
Dept: GENERAL RADIOLOGY | Age: 25
Discharge: HOME | End: 2020-10-28
Attending: ORTHOPAEDIC SURGERY
Payer: COMMERCIAL

## 2020-10-28 ENCOUNTER — OFFICE VISIT (OUTPATIENT)
Dept: ORTHOPEDICS CLINIC | Facility: CLINIC | Age: 25
End: 2020-10-28
Payer: COMMERCIAL

## 2020-10-28 VITALS — HEART RATE: 76 BPM | OXYGEN SATURATION: 99 %

## 2020-10-28 DIAGNOSIS — S62.346A CLOSED NONDISPLACED FRACTURE OF BASE OF FIFTH METACARPAL BONE OF RIGHT HAND, INITIAL ENCOUNTER: ICD-10-CM

## 2020-10-28 DIAGNOSIS — S62.346A CLOSED NONDISPLACED FRACTURE OF BASE OF FIFTH METACARPAL BONE OF RIGHT HAND, INITIAL ENCOUNTER: Primary | ICD-10-CM

## 2020-10-28 PROCEDURE — 99024 POSTOP FOLLOW-UP VISIT: CPT | Performed by: ORTHOPAEDIC SURGERY

## 2020-10-28 PROCEDURE — 73130 X-RAY EXAM OF HAND: CPT | Performed by: ORTHOPAEDIC SURGERY

## 2020-10-28 RX ORDER — HYDROCODONE BITARTRATE AND ACETAMINOPHEN 5; 325 MG/1; MG/1
1 TABLET ORAL EVERY 6 HOURS PRN
Qty: 15 TABLET | Refills: 0 | Status: ON HOLD | OUTPATIENT
Start: 2020-10-28 | End: 2020-11-13

## 2020-10-28 NOTE — PROGRESS NOTES
[facility] New Patient Note    CC: Right fifth metacarpal fracture    DOI: 10/12/2020    HPI: This 23year old right-hand-dominant male presents today with metacarpal fracture of his right hand.   His air conditioning unit fell onto his right hand Mon • Albuterol Sulfate  (90 Base) MCG/ACT Inhalation Aero Soln Inhale 1 puff into the lungs every 6 (six) hours as needed for Wheezing. • lamoTRIgine 25 MG Oral Tab Take 50 mg by mouth 2 (two) times daily.      • loratadine 10 MG Oral Tab Take 10 mg Respiratory: Negative for cough, shortness of breath and wheezing. Cardiovascular: Negative for chest pain, palpitations and leg swelling. Gastrointestinal: Negative for abdominal pain, nausea and vomiting.    Endocrine: Negative for polydipsia, polyph Assessment/Plan:  53-year-old male right-hand-dominant with a right fifth metacarpal base fracture with comminution and intra-articular extension with some displacement. New rx for norco to be taken at night.  Patient will see OT today for splint modificatio

## 2020-11-04 ENCOUNTER — OFFICE VISIT (OUTPATIENT)
Dept: ORTHOPEDICS CLINIC | Facility: CLINIC | Age: 25
End: 2020-11-04
Payer: COMMERCIAL

## 2020-11-04 ENCOUNTER — HOSPITAL ENCOUNTER (OUTPATIENT)
Dept: GENERAL RADIOLOGY | Age: 25
Discharge: HOME | End: 2020-11-04
Attending: ORTHOPAEDIC SURGERY
Payer: COMMERCIAL

## 2020-11-04 VITALS — OXYGEN SATURATION: 98 % | HEART RATE: 85 BPM

## 2020-11-04 DIAGNOSIS — M79.641 HAND PAIN, RIGHT: ICD-10-CM

## 2020-11-04 DIAGNOSIS — S62.346A CLOSED NONDISPLACED FRACTURE OF BASE OF FIFTH METACARPAL BONE OF RIGHT HAND, INITIAL ENCOUNTER: Primary | ICD-10-CM

## 2020-11-04 PROCEDURE — 99024 POSTOP FOLLOW-UP VISIT: CPT | Performed by: ORTHOPAEDIC SURGERY

## 2020-11-04 PROCEDURE — 73130 X-RAY EXAM OF HAND: CPT | Performed by: ORTHOPAEDIC SURGERY

## 2020-11-04 NOTE — PROGRESS NOTES
[facility] New Patient Note    CC: Right fifth metacarpal fracture    DOI: 10/12/2020    HPI: This 23year old right-hand-dominant male presents today with metacarpal fracture of his right hand.   His air conditioning unit fell onto his right hand Mon • Albuterol Sulfate  (90 Base) MCG/ACT Inhalation Aero Soln Inhale 1 puff into the lungs every 6 (six) hours as needed for Wheezing. • lamoTRIgine 25 MG Oral Tab Take 50 mg by mouth 2 (two) times daily.      • loratadine 10 MG Oral Tab Take 10 mg Respiratory: Negative for cough, shortness of breath and wheezing. Cardiovascular: Negative for chest pain, palpitations and leg swelling. Gastrointestinal: Negative for abdominal pain, nausea and vomiting.    Endocrine: Negative for polydipsia, polyph Assessment/Plan:  26-year-old male right-hand-dominant with a right fifth metacarpal base fracture with comminution and intra-articular extension with some displacement. He is 3 weeks out and still having more pain than expected.   He does note that his got

## 2020-11-06 RX ORDER — HYDROCODONE BITARTRATE AND ACETAMINOPHEN 5; 325 MG/1; MG/1
1 TABLET ORAL EVERY 6 HOURS PRN
Qty: 30 TABLET | Refills: 0 | Status: ON HOLD | OUTPATIENT
Start: 2020-11-06 | End: 2020-11-13

## 2020-11-10 ENCOUNTER — HOSPITAL ENCOUNTER (OUTPATIENT)
Dept: CT IMAGING | Facility: HOSPITAL | Age: 25
Discharge: HOME | End: 2020-11-10
Attending: ORTHOPAEDIC SURGERY
Payer: COMMERCIAL

## 2020-11-10 DIAGNOSIS — S62.316G: Primary | ICD-10-CM

## 2020-11-10 DIAGNOSIS — S62.346A CLOSED NONDISPLACED FRACTURE OF BASE OF FIFTH METACARPAL BONE OF RIGHT HAND, INITIAL ENCOUNTER: ICD-10-CM

## 2020-11-10 PROCEDURE — 73200 CT UPPER EXTREMITY W/O DYE: CPT | Performed by: ORTHOPAEDIC SURGERY

## 2020-11-10 PROCEDURE — 76377 3D RENDER W/INTRP POSTPROCES: CPT | Performed by: ORTHOPAEDIC SURGERY

## 2020-11-10 RX ORDER — ACETAMINOPHEN 500 MG
1000 TABLET ORAL ONCE
Status: CANCELLED | OUTPATIENT
Start: 2020-11-10 | End: 2020-11-10

## 2020-11-10 RX ORDER — MULTIVITAMIN
1 TABLET ORAL DAILY
COMMUNITY

## 2020-11-10 RX ORDER — MELATONIN
1000 DAILY
COMMUNITY

## 2020-11-10 RX ORDER — MULTIVIT WITH MINERALS/LUTEIN
1000 TABLET ORAL DAILY
COMMUNITY

## 2020-11-10 NOTE — PROGRESS NOTES
Clinically the patient continues to have significant pain due to fracture site motion.   Given that there is no callus noted on CT scan and the significant pain the patient is in due to fracture motion I think percutaneous fixation with possible open reduct

## 2020-11-11 ENCOUNTER — APPOINTMENT (OUTPATIENT)
Dept: LAB | Facility: HOSPITAL | Age: 25
End: 2020-11-11
Attending: ORTHOPAEDIC SURGERY
Payer: COMMERCIAL

## 2020-11-11 DIAGNOSIS — S62.316G CLOSED DISPLACED FRACTURE OF BASE OF FIFTH METACARPAL BONE OF RIGHT HAND WITH DELAYED HEALING, SUBSEQUENT ENCOUNTER: ICD-10-CM

## 2020-11-13 ENCOUNTER — HOSPITAL ENCOUNTER (OUTPATIENT)
Facility: HOSPITAL | Age: 25
Setting detail: HOSPITAL OUTPATIENT SURGERY
Discharge: HOME | End: 2020-11-13
Attending: ORTHOPAEDIC SURGERY | Admitting: ORTHOPAEDIC SURGERY
Payer: COMMERCIAL

## 2020-11-13 ENCOUNTER — ANESTHESIA (OUTPATIENT)
Dept: SURGERY | Facility: HOSPITAL | Age: 25
End: 2020-11-13
Payer: COMMERCIAL

## 2020-11-13 ENCOUNTER — ANESTHESIA EVENT (OUTPATIENT)
Dept: SURGERY | Facility: HOSPITAL | Age: 25
End: 2020-11-13
Payer: COMMERCIAL

## 2020-11-13 ENCOUNTER — APPOINTMENT (OUTPATIENT)
Dept: GENERAL RADIOLOGY | Facility: HOSPITAL | Age: 25
End: 2020-11-13
Attending: ORTHOPAEDIC SURGERY
Payer: COMMERCIAL

## 2020-11-13 VITALS
HEART RATE: 68 BPM | HEIGHT: 68 IN | WEIGHT: 210 LBS | TEMPERATURE: 97 F | BODY MASS INDEX: 31.83 KG/M2 | RESPIRATION RATE: 16 BRPM | OXYGEN SATURATION: 98 % | DIASTOLIC BLOOD PRESSURE: 77 MMHG | SYSTOLIC BLOOD PRESSURE: 127 MMHG

## 2020-11-13 DIAGNOSIS — S62.316G: ICD-10-CM

## 2020-11-13 DIAGNOSIS — S62.316G CLOSED DISPLACED FRACTURE OF BASE OF FIFTH METACARPAL BONE OF RIGHT HAND WITH DELAYED HEALING, SUBSEQUENT ENCOUNTER: Primary | ICD-10-CM

## 2020-11-13 PROCEDURE — 76000 FLUOROSCOPY <1 HR PHYS/QHP: CPT | Performed by: ORTHOPAEDIC SURGERY

## 2020-11-13 PROCEDURE — 0PSP04Z REPOSITION RIGHT METACARPAL WITH INTERNAL FIXATION DEVICE, OPEN APPROACH: ICD-10-PCS | Performed by: ORTHOPAEDIC SURGERY

## 2020-11-13 PROCEDURE — 26615 TREAT METACARPAL FRACTURE: CPT | Performed by: ORTHOPAEDIC SURGERY

## 2020-11-13 DEVICE — WIRE K SMALL .062: Type: IMPLANTABLE DEVICE | Site: FINGER | Status: FUNCTIONAL

## 2020-11-13 RX ORDER — MIDAZOLAM HYDROCHLORIDE 1 MG/ML
INJECTION INTRAMUSCULAR; INTRAVENOUS AS NEEDED
Status: DISCONTINUED | OUTPATIENT
Start: 2020-11-13 | End: 2020-11-13 | Stop reason: SURG

## 2020-11-13 RX ORDER — OXYCODONE HYDROCHLORIDE 5 MG/1
5 TABLET ORAL EVERY 4 HOURS PRN
Qty: 20 TABLET | Refills: 0 | Status: SHIPPED | OUTPATIENT
Start: 2020-11-13 | End: 2020-12-23

## 2020-11-13 RX ORDER — SCOLOPAMINE TRANSDERMAL SYSTEM 1 MG/1
PATCH, EXTENDED RELEASE TRANSDERMAL AS NEEDED
Status: DISCONTINUED | OUTPATIENT
Start: 2020-11-13 | End: 2020-11-13 | Stop reason: SURG

## 2020-11-13 RX ORDER — HYDROCODONE BITARTRATE AND ACETAMINOPHEN 5; 325 MG/1; MG/1
2 TABLET ORAL AS NEEDED
Status: COMPLETED | OUTPATIENT
Start: 2020-11-13 | End: 2020-11-13

## 2020-11-13 RX ORDER — NALOXONE HYDROCHLORIDE 0.4 MG/ML
80 INJECTION, SOLUTION INTRAMUSCULAR; INTRAVENOUS; SUBCUTANEOUS AS NEEDED
Status: DISCONTINUED | OUTPATIENT
Start: 2020-11-13 | End: 2020-11-13

## 2020-11-13 RX ORDER — DIPHENHYDRAMINE HYDROCHLORIDE 50 MG/ML
12.5 INJECTION INTRAMUSCULAR; INTRAVENOUS AS NEEDED
Status: DISCONTINUED | OUTPATIENT
Start: 2020-11-13 | End: 2020-11-13

## 2020-11-13 RX ORDER — LIDOCAINE HYDROCHLORIDE 10 MG/ML
INJECTION, SOLUTION INFILTRATION; PERINEURAL AS NEEDED
Status: DISCONTINUED | OUTPATIENT
Start: 2020-11-13 | End: 2020-11-13 | Stop reason: HOSPADM

## 2020-11-13 RX ORDER — HYDROCODONE BITARTRATE AND ACETAMINOPHEN 5; 325 MG/1; MG/1
1 TABLET ORAL AS NEEDED
Status: COMPLETED | OUTPATIENT
Start: 2020-11-13 | End: 2020-11-13

## 2020-11-13 RX ORDER — LIDOCAINE HYDROCHLORIDE 10 MG/ML
INJECTION, SOLUTION EPIDURAL; INFILTRATION; INTRACAUDAL; PERINEURAL AS NEEDED
Status: DISCONTINUED | OUTPATIENT
Start: 2020-11-13 | End: 2020-11-13 | Stop reason: SURG

## 2020-11-13 RX ORDER — VANCOMYCIN HYDROCHLORIDE
15 ONCE
Status: DISCONTINUED | OUTPATIENT
Start: 2020-11-13 | End: 2020-11-13 | Stop reason: HOSPADM

## 2020-11-13 RX ORDER — KETOROLAC TROMETHAMINE 30 MG/ML
INJECTION, SOLUTION INTRAMUSCULAR; INTRAVENOUS AS NEEDED
Status: DISCONTINUED | OUTPATIENT
Start: 2020-11-13 | End: 2020-11-13 | Stop reason: SURG

## 2020-11-13 RX ORDER — HYDROMORPHONE HYDROCHLORIDE 1 MG/ML
0.4 INJECTION, SOLUTION INTRAMUSCULAR; INTRAVENOUS; SUBCUTANEOUS EVERY 5 MIN PRN
Status: DISCONTINUED | OUTPATIENT
Start: 2020-11-13 | End: 2020-11-13

## 2020-11-13 RX ORDER — ONDANSETRON 2 MG/ML
4 INJECTION INTRAMUSCULAR; INTRAVENOUS AS NEEDED
Status: DISCONTINUED | OUTPATIENT
Start: 2020-11-13 | End: 2020-11-13

## 2020-11-13 RX ORDER — DEXAMETHASONE SODIUM PHOSPHATE 4 MG/ML
VIAL (ML) INJECTION AS NEEDED
Status: DISCONTINUED | OUTPATIENT
Start: 2020-11-13 | End: 2020-11-13 | Stop reason: SURG

## 2020-11-13 RX ORDER — MEPERIDINE HYDROCHLORIDE 25 MG/ML
12.5 INJECTION INTRAMUSCULAR; INTRAVENOUS; SUBCUTANEOUS AS NEEDED
Status: DISCONTINUED | OUTPATIENT
Start: 2020-11-13 | End: 2020-11-13

## 2020-11-13 RX ORDER — ACETAMINOPHEN 500 MG
1000 TABLET ORAL ONCE
COMMUNITY
End: 2020-12-23

## 2020-11-13 RX ORDER — SODIUM CHLORIDE, SODIUM LACTATE, POTASSIUM CHLORIDE, CALCIUM CHLORIDE 600; 310; 30; 20 MG/100ML; MG/100ML; MG/100ML; MG/100ML
INJECTION, SOLUTION INTRAVENOUS CONTINUOUS
Status: DISCONTINUED | OUTPATIENT
Start: 2020-11-13 | End: 2020-11-13

## 2020-11-13 RX ORDER — ONDANSETRON 2 MG/ML
INJECTION INTRAMUSCULAR; INTRAVENOUS AS NEEDED
Status: DISCONTINUED | OUTPATIENT
Start: 2020-11-13 | End: 2020-11-13 | Stop reason: SURG

## 2020-11-13 RX ORDER — BUPIVACAINE HYDROCHLORIDE 5 MG/ML
INJECTION, SOLUTION EPIDURAL; INTRACAUDAL AS NEEDED
Status: DISCONTINUED | OUTPATIENT
Start: 2020-11-13 | End: 2020-11-13 | Stop reason: HOSPADM

## 2020-11-13 RX ORDER — PHENYLEPHRINE HCL 10 MG/ML
VIAL (ML) INJECTION AS NEEDED
Status: DISCONTINUED | OUTPATIENT
Start: 2020-11-13 | End: 2020-11-13 | Stop reason: SURG

## 2020-11-13 RX ADMIN — SCOLOPAMINE TRANSDERMAL SYSTEM 1 PATCH: 1 PATCH, EXTENDED RELEASE TRANSDERMAL at 07:15:00

## 2020-11-13 RX ADMIN — MIDAZOLAM HYDROCHLORIDE 2 MG: 1 INJECTION INTRAMUSCULAR; INTRAVENOUS at 07:05:00

## 2020-11-13 RX ADMIN — DEXAMETHASONE SODIUM PHOSPHATE 8 MG: 4 MG/ML VIAL (ML) INJECTION at 07:15:00

## 2020-11-13 RX ADMIN — LIDOCAINE HYDROCHLORIDE 50 MG: 10 INJECTION, SOLUTION EPIDURAL; INFILTRATION; INTRACAUDAL; PERINEURAL at 07:08:00

## 2020-11-13 RX ADMIN — PHENYLEPHRINE HCL 50 MCG: 10 MG/ML VIAL (ML) INJECTION at 07:45:00

## 2020-11-13 RX ADMIN — ONDANSETRON 4 MG: 2 INJECTION INTRAMUSCULAR; INTRAVENOUS at 08:14:00

## 2020-11-13 RX ADMIN — KETOROLAC TROMETHAMINE 30 MG: 30 INJECTION, SOLUTION INTRAMUSCULAR; INTRAVENOUS at 08:14:00

## 2020-11-13 RX ADMIN — SODIUM CHLORIDE, SODIUM LACTATE, POTASSIUM CHLORIDE, CALCIUM CHLORIDE: 600; 310; 30; 20 INJECTION, SOLUTION INTRAVENOUS at 08:40:00

## 2020-11-13 NOTE — INTERVAL H&P NOTE
Pre-op Diagnosis: Closed displaced fracture of base of fifth metacarpal bone of right hand with delayed healing [S62.316G]    The above referenced H&P was reviewed by Akilah Avendano MD on 11/13/2020, the patient was examined and no significant changes have

## 2020-11-13 NOTE — ANESTHESIA POSTPROCEDURE EVALUATION
[address] [place] [address] Patient Status:  Hospital Outpatient Surgery   Age/Gender 23year old male MRN [id number]   Location [facility] Attending Kim Dewitt MD   Hosp Day # 0 PCP MD Renaldo Montoya

## 2020-11-13 NOTE — ANESTHESIA PREPROCEDURE EVALUATION
PRE-OP EVALUATION    Patient Name: Johan Monahan    Pre-op Diagnosis: Closed displaced fracture of base of fifth metacarpal bone of right hand with delayed healing [S62.316G]    Procedure(s):  Open reduction of right 5th metacarpal fracture and perc Airway      Mallampati: II  Mouth opening: >3 FB  TM distance: > 6 cm  Neck ROM: full Cardiovascular    Cardiovascular exam normal.  Rhythm: regular  Rate: normal     Dental    No notable dental history.          Pulmonary    Pulmonary exam normal.  B

## 2020-11-13 NOTE — BRIEF OP NOTE
Pre-Operative Diagnosis: Closed displaced fracture of base of fifth metacarpal bone of right hand with delayed healing [S62.316G]     Post-Operative Diagnosis: Closed displaced fracture of base of fifth metacarpal bone of right hand with delayed healing [S

## 2020-11-13 NOTE — ANESTHESIA PROCEDURE NOTES
Airway  Urgency: elective    Airway not difficult    General Information and Staff    Patient location during procedure: OR  Anesthesiologist: Jonas Napoles MD  Resident/CRNA: Sudhakar Torres CRNA  Performed: CRNA     Indications and Patient Condition

## 2020-11-16 NOTE — OPERATIVE REPORT
Operative Note    Patient Name: Livia Nelson    Preoperative Diagnosis: Closed displaced fracture of base of fifth metacarpal bone of right hand with delayed healing [S68.013G]    Postoperative Diagnosis: same    Primary Surgeon: Forrest Madrid    As signs of chondromalacia. The dorsal fracture line looked healed with callus noted. And what I could visualize of the volar fragments through the [facility] joint that fracture fragment also look healed. No loose bodies were noted in the fifth [facility] joint.   The b

## 2020-11-19 ENCOUNTER — HOSPITAL ENCOUNTER (EMERGENCY)
Facility: HOSPITAL | Age: 25
Discharge: HOME | End: 2020-11-19
Attending: EMERGENCY MEDICINE
Payer: COMMERCIAL

## 2020-11-19 VITALS
RESPIRATION RATE: 19 BRPM | HEIGHT: 67 IN | SYSTOLIC BLOOD PRESSURE: 127 MMHG | WEIGHT: 205 LBS | TEMPERATURE: 98 F | BODY MASS INDEX: 32.18 KG/M2 | OXYGEN SATURATION: 96 % | HEART RATE: 102 BPM | DIASTOLIC BLOOD PRESSURE: 85 MMHG

## 2020-11-19 DIAGNOSIS — R19.7 NAUSEA VOMITING AND DIARRHEA: Primary | ICD-10-CM

## 2020-11-19 DIAGNOSIS — R11.2 NAUSEA VOMITING AND DIARRHEA: Primary | ICD-10-CM

## 2020-11-19 DIAGNOSIS — G24.02 DYSTONIC DRUG REACTION: ICD-10-CM

## 2020-11-19 PROCEDURE — 83690 ASSAY OF LIPASE: CPT | Performed by: EMERGENCY MEDICINE

## 2020-11-19 PROCEDURE — 96374 THER/PROPH/DIAG INJ IV PUSH: CPT

## 2020-11-19 PROCEDURE — 80053 COMPREHEN METABOLIC PANEL: CPT | Performed by: EMERGENCY MEDICINE

## 2020-11-19 PROCEDURE — 99284 EMERGENCY DEPT VISIT MOD MDM: CPT

## 2020-11-19 PROCEDURE — 80053 COMPREHEN METABOLIC PANEL: CPT

## 2020-11-19 PROCEDURE — 83690 ASSAY OF LIPASE: CPT

## 2020-11-19 PROCEDURE — 85025 COMPLETE CBC W/AUTO DIFF WBC: CPT

## 2020-11-19 PROCEDURE — 96375 TX/PRO/DX INJ NEW DRUG ADDON: CPT

## 2020-11-19 PROCEDURE — 96361 HYDRATE IV INFUSION ADD-ON: CPT

## 2020-11-19 PROCEDURE — 85025 COMPLETE CBC W/AUTO DIFF WBC: CPT | Performed by: EMERGENCY MEDICINE

## 2020-11-19 RX ORDER — ONDANSETRON 2 MG/ML
4 INJECTION INTRAMUSCULAR; INTRAVENOUS ONCE
Status: COMPLETED | OUTPATIENT
Start: 2020-11-19 | End: 2020-11-19

## 2020-11-19 RX ORDER — ONDANSETRON 4 MG/1
4 TABLET, ORALLY DISINTEGRATING ORAL EVERY 4 HOURS PRN
Qty: 10 TABLET | Refills: 0 | Status: SHIPPED | OUTPATIENT
Start: 2020-11-19 | End: 2020-11-26

## 2020-11-19 RX ORDER — DIPHENHYDRAMINE HYDROCHLORIDE 50 MG/ML
25 INJECTION INTRAMUSCULAR; INTRAVENOUS ONCE
Status: COMPLETED | OUTPATIENT
Start: 2020-11-19 | End: 2020-11-19

## 2020-11-19 RX ORDER — LORAZEPAM 2 MG/ML
1 INJECTION INTRAMUSCULAR ONCE
Status: COMPLETED | OUTPATIENT
Start: 2020-11-19 | End: 2020-11-19

## 2020-11-19 RX ORDER — METOCLOPRAMIDE HYDROCHLORIDE 5 MG/ML
10 INJECTION INTRAMUSCULAR; INTRAVENOUS ONCE
Status: COMPLETED | OUTPATIENT
Start: 2020-11-19 | End: 2020-11-19

## 2020-11-19 RX ORDER — DIPHENHYDRAMINE HYDROCHLORIDE 50 MG/ML
INJECTION INTRAMUSCULAR; INTRAVENOUS
Status: COMPLETED
Start: 2020-11-19 | End: 2020-11-19

## 2020-11-19 RX ORDER — LORAZEPAM 2 MG/ML
INJECTION INTRAMUSCULAR
Status: COMPLETED
Start: 2020-11-19 | End: 2020-11-19

## 2020-11-19 RX ORDER — HYDROCODONE BITARTRATE AND ACETAMINOPHEN 5; 325 MG/1; MG/1
1 TABLET ORAL EVERY 6 HOURS PRN
COMMUNITY
End: 2021-01-20

## 2020-11-19 RX ORDER — LORAZEPAM 2 MG/ML
2 INJECTION INTRAMUSCULAR ONCE
Status: COMPLETED | OUTPATIENT
Start: 2020-11-19 | End: 2020-11-19

## 2020-11-19 NOTE — ED INITIAL ASSESSMENT (HPI)
Pt to ED with c/o N/V x 24 hours. Pt had fx surgery on Friday 11/13/20 on this left arm. Pt was taking oxycodone for pain control which was stopped on Tuesday afternoon.  Pt has hx of pancreatitis r/t ETOH pt states he has not had a drink since being DC's f

## 2020-11-19 NOTE — ED NOTES
Pt put on call light, pt thrashing in bed stating he doesn't feel right.  Pt placed on cardiac monitor, MD aware and at bedside, orders given

## 2020-11-20 NOTE — ED PROVIDER NOTES
Patient Seen in: [facility] Emergency Department      History   Patient presents with:  Nausea/Vomiting/Diarrhea    Stated Complaint: nausea and vomiting, post op ortho procedure to right arm friday     HPI    3 days of nausea and vomiting.   He had and oriented to person, place, and time. No cranial nerve deficit. Skin: Skin is warm and dry. Psychiatric: Normal mood and affect.  Thought content normal.       ED Course     Labs Reviewed   COMP METABOLIC PANEL (14) - Abnormal; Notable for the foll (primary encounter diagnosis)  Dystonic drug reaction    Disposition:  Discharge  11/19/2020  4:19 pm    Follow-up:  Kel Monk MD  [address] Lachelle Cruz [address] [id number]                Medications Prescribed:  Discharge Medication List

## 2020-11-22 ENCOUNTER — HOSPITAL ENCOUNTER (EMERGENCY)
Facility: HOSPITAL | Age: 25
Discharge: HOME | End: 2020-11-22
Attending: EMERGENCY MEDICINE
Payer: COMMERCIAL

## 2020-11-22 VITALS
TEMPERATURE: 98 F | DIASTOLIC BLOOD PRESSURE: 77 MMHG | WEIGHT: 205 LBS | OXYGEN SATURATION: 99 % | HEIGHT: 67 IN | RESPIRATION RATE: 18 BRPM | HEART RATE: 74 BPM | SYSTOLIC BLOOD PRESSURE: 127 MMHG | BODY MASS INDEX: 32.18 KG/M2

## 2020-11-22 DIAGNOSIS — R11.2 NAUSEA AND VOMITING IN ADULT: Primary | ICD-10-CM

## 2020-11-22 DIAGNOSIS — E86.0 DEHYDRATION: ICD-10-CM

## 2020-11-22 PROCEDURE — 96374 THER/PROPH/DIAG INJ IV PUSH: CPT

## 2020-11-22 PROCEDURE — 96361 HYDRATE IV INFUSION ADD-ON: CPT

## 2020-11-22 PROCEDURE — 99284 EMERGENCY DEPT VISIT MOD MDM: CPT

## 2020-11-22 PROCEDURE — 81001 URINALYSIS AUTO W/SCOPE: CPT | Performed by: EMERGENCY MEDICINE

## 2020-11-22 PROCEDURE — 80053 COMPREHEN METABOLIC PANEL: CPT | Performed by: EMERGENCY MEDICINE

## 2020-11-22 PROCEDURE — 83690 ASSAY OF LIPASE: CPT | Performed by: EMERGENCY MEDICINE

## 2020-11-22 PROCEDURE — 85025 COMPLETE CBC W/AUTO DIFF WBC: CPT | Performed by: EMERGENCY MEDICINE

## 2020-11-22 PROCEDURE — C9113 INJ PANTOPRAZOLE SODIUM, VIA: HCPCS | Performed by: EMERGENCY MEDICINE

## 2020-11-22 PROCEDURE — 96375 TX/PRO/DX INJ NEW DRUG ADDON: CPT

## 2020-11-22 PROCEDURE — 96376 TX/PRO/DX INJ SAME DRUG ADON: CPT

## 2020-11-22 PROCEDURE — 99285 EMERGENCY DEPT VISIT HI MDM: CPT

## 2020-11-22 RX ORDER — ONDANSETRON 8 MG/1
8 TABLET, ORALLY DISINTEGRATING ORAL EVERY 6 HOURS PRN
Qty: 10 TABLET | Refills: 0 | Status: SHIPPED | OUTPATIENT
Start: 2020-11-22 | End: 2020-12-23

## 2020-11-22 RX ORDER — POTASSIUM CHLORIDE 750 MG/1
10 TABLET, EXTENDED RELEASE ORAL ONCE
Status: COMPLETED | OUTPATIENT
Start: 2020-11-22 | End: 2020-11-22

## 2020-11-22 RX ORDER — DEXTROSE, SODIUM CHLORIDE, SODIUM LACTATE, POTASSIUM CHLORIDE, AND CALCIUM CHLORIDE 5; .6; .31; .03; .02 G/100ML; G/100ML; G/100ML; G/100ML; G/100ML
INJECTION, SOLUTION INTRAVENOUS CONTINUOUS
Status: DISCONTINUED | OUTPATIENT
Start: 2020-11-22 | End: 2020-11-22

## 2020-11-22 RX ORDER — SODIUM CHLORIDE 9 MG/ML
125 INJECTION, SOLUTION INTRAVENOUS CONTINUOUS
Status: DISCONTINUED | OUTPATIENT
Start: 2020-11-22 | End: 2020-11-22

## 2020-11-22 RX ORDER — ONDANSETRON 2 MG/ML
4 INJECTION INTRAMUSCULAR; INTRAVENOUS
Status: COMPLETED | OUTPATIENT
Start: 2020-11-22 | End: 2020-11-22

## 2020-11-22 RX ORDER — SODIUM CHLORIDE 9 MG/ML
INJECTION, SOLUTION INTRAVENOUS CONTINUOUS
Status: DISCONTINUED | OUTPATIENT
Start: 2020-11-22 | End: 2020-11-22

## 2020-11-22 NOTE — ED INITIAL ASSESSMENT (HPI)
Arrives with c/o vomiting. States was seen here on Thursday for the same. Actively vomiting at the time of the assessment.

## 2020-11-22 NOTE — ED PROVIDER NOTES
Patient Seen in: [facility] Emergency Department      History   Patient presents with:  Nausea/Vomiting/Diarrhea    Stated Complaint:     HPI    Patient was to the emergency department 3 days ago with complaints of nausea and vomiting.    He was also HISTORY  08/18/2017    balloon sinuplasty   • TONSILLECTOMY     • UPPER GI ENDOSCOPY - REFERRAL                      Social History    Tobacco Use      Smoking status: Former Smoker        Years: 3.50        Quit date: 1/11/2016        Years since quitting Glucose 101 (*)     Potassium 3.4 (*)     CO2 19.0 (*)     Total Protein 8.6 (*)     All other components within normal limits   URINALYSIS WITH CULTURE REFLEX - Abnormal; Notable for the following components:    Glucose Urine >=500 (*)     Ketones Urine 8 patient may be safely discharged home. I recommend rest, fluids, bland light diet, around-the-clock Zofran, avoiding common stomach irritants, restarting his omeprazole, avoiding marijuana, and close follow-up with his primary care doctor.   Patient and hi

## 2020-11-23 ENCOUNTER — OFFICE VISIT (OUTPATIENT)
Dept: ORTHOPEDICS CLINIC | Facility: CLINIC | Age: 25
End: 2020-11-23
Payer: COMMERCIAL

## 2020-11-23 ENCOUNTER — HOSPITAL ENCOUNTER (OUTPATIENT)
Dept: GENERAL RADIOLOGY | Age: 25
Discharge: HOME | End: 2020-11-23
Attending: ORTHOPAEDIC SURGERY
Payer: COMMERCIAL

## 2020-11-23 VITALS — HEART RATE: 82 BPM | OXYGEN SATURATION: 98 %

## 2020-11-23 DIAGNOSIS — S62.316G CLOSED DISPLACED FRACTURE OF BASE OF FIFTH METACARPAL BONE OF RIGHT HAND WITH DELAYED HEALING, SUBSEQUENT ENCOUNTER: ICD-10-CM

## 2020-11-23 DIAGNOSIS — S62.316G CLOSED DISPLACED FRACTURE OF BASE OF FIFTH METACARPAL BONE OF RIGHT HAND WITH DELAYED HEALING, SUBSEQUENT ENCOUNTER: Primary | ICD-10-CM

## 2020-11-23 PROCEDURE — 29075 APPL CST ELBW FNGR SHORT ARM: CPT | Performed by: ORTHOPAEDIC SURGERY

## 2020-11-23 PROCEDURE — 73130 X-RAY EXAM OF HAND: CPT | Performed by: ORTHOPAEDIC SURGERY

## 2020-11-23 PROCEDURE — 99024 POSTOP FOLLOW-UP VISIT: CPT | Performed by: ORTHOPAEDIC SURGERY

## 2020-11-23 NOTE — PROGRESS NOTES
[facility] New Patient Note    CC: Status post open reduction and percutaneous pinning of right fifth metacarpal base fracture    DOI: 10/12/2020  Date of surgery: 11/13/2020    HPI: This 23year old right-hand-dominant male with metacarpal fracture • acetaminophen 500 MG Oral Tab Take 1,000 mg by mouth one time. • oxyCODONE HCl 5 MG Oral Tab Take 1 tablet (5 mg total) by mouth every 4 (four) hours as needed for Pain. 20 tablet 0   • Multiple Vitamin Oral Tab Take 1 tablet by mouth daily.      • Vi • Other (Hyperlipidemia) Paternal Grandmother    • Other (Heart Murmur) Paternal Grandmother    • Hypertension Maternal Grandfather    • Depression Maternal Grandfather    • Stroke Maternal Grandfather    • Depression Maternal Grandmother    • Asthma Mater Constitutional: Patient is oriented to person, place, and time. Patient appears well-developed and well-nourished. No distress. Head: Normocephalic. Eyes: Pupils are equal, round, and reactive to light.  Conjunctivae and EOM are normal. No scleral icter

## 2020-12-02 ENCOUNTER — OFFICE VISIT (OUTPATIENT)
Dept: ORTHOPEDICS CLINIC | Facility: CLINIC | Age: 25
End: 2020-12-02
Payer: COMMERCIAL

## 2020-12-02 ENCOUNTER — TELEPHONE (OUTPATIENT)
Dept: PHYSICAL THERAPY | Facility: HOSPITAL | Age: 25
End: 2020-12-02

## 2020-12-02 ENCOUNTER — TELEPHONE (OUTPATIENT)
Dept: PHYSICAL THERAPY | Age: 25
End: 2020-12-02

## 2020-12-02 ENCOUNTER — HOSPITAL ENCOUNTER (OUTPATIENT)
Dept: GENERAL RADIOLOGY | Age: 25
Discharge: HOME | End: 2020-12-02
Attending: ORTHOPAEDIC SURGERY
Payer: COMMERCIAL

## 2020-12-02 VITALS — OXYGEN SATURATION: 98 % | HEART RATE: 75 BPM

## 2020-12-02 DIAGNOSIS — S62.316G CLOSED DISPLACED FRACTURE OF BASE OF FIFTH METACARPAL BONE OF RIGHT HAND WITH DELAYED HEALING, SUBSEQUENT ENCOUNTER: ICD-10-CM

## 2020-12-02 DIAGNOSIS — S62.316G CLOSED DISPLACED FRACTURE OF BASE OF FIFTH METACARPAL BONE OF RIGHT HAND WITH DELAYED HEALING, SUBSEQUENT ENCOUNTER: Primary | ICD-10-CM

## 2020-12-02 PROCEDURE — 73130 X-RAY EXAM OF HAND: CPT | Performed by: ORTHOPAEDIC SURGERY

## 2020-12-02 PROCEDURE — 99024 POSTOP FOLLOW-UP VISIT: CPT | Performed by: ORTHOPAEDIC SURGERY

## 2020-12-02 RX ORDER — HYDROCODONE BITARTRATE AND ACETAMINOPHEN 5; 325 MG/1; MG/1
1 TABLET ORAL EVERY 6 HOURS PRN
Qty: 20 TABLET | Refills: 0 | Status: SHIPPED | OUTPATIENT
Start: 2020-12-02 | End: 2021-01-20

## 2020-12-02 NOTE — PROGRESS NOTES
[facility] New Patient Note    CC: Status post open reduction and percutaneous pinning of right fifth metacarpal base fracture    DOI: 10/12/2020  Date of surgery: 11/13/2020    HPI: This 23year old right-hand-dominant male with metacarpal fracture • oxyCODONE HCl 5 MG Oral Tab Take 1 tablet (5 mg total) by mouth every 4 (four) hours as needed for Pain. 20 tablet 0   • Multiple Vitamin Oral Tab Take 1 tablet by mouth daily. • Vitamin D3 25 MCG (1000 UT) Oral Tab Take 1,000 Units by mouth daily. • Other (Heart Murmur) Paternal Grandmother    • Hypertension Maternal Grandfather    • Depression Maternal Grandfather    • Stroke Maternal Grandfather    • Depression Maternal Grandmother    • Asthma Maternal Grandmother    • Hypertension Other         A Constitutional: Patient is oriented to person, place, and time. Patient appears well-developed and well-nourished. No distress. Head: Normocephalic. Eyes: Pupils are equal, round, and reactive to light.  Conjunctivae and EOM are normal. No scleral icter

## 2020-12-03 ENCOUNTER — OFFICE VISIT (OUTPATIENT)
Dept: OCCUPATIONAL MEDICINE | Facility: HOSPITAL | Age: 25
End: 2020-12-03
Attending: ORTHOPAEDIC SURGERY
Payer: COMMERCIAL

## 2020-12-03 DIAGNOSIS — S62.316G CLOSED DISPLACED FRACTURE OF BASE OF FIFTH METACARPAL BONE OF RIGHT HAND WITH DELAYED HEALING, SUBSEQUENT ENCOUNTER: ICD-10-CM

## 2020-12-03 PROCEDURE — 97167 OT EVAL HIGH COMPLEX 60 MIN: CPT

## 2020-12-03 PROCEDURE — 97535 SELF CARE MNGMENT TRAINING: CPT

## 2020-12-03 NOTE — PROGRESS NOTES
OCCUPATIONAL THERAPY UPPER EXTREMITY EVALUATION   Referring Physician: Dr. Bradly Marcum  Diagnosis:  Closed displaced fracture of base of fifth metacarpal bone of right hand with delayed healing, subsequent encounter (I65.654G)   Date of Service: 12/3/2020     P Functional deficits include but are not limited to all and any use of dominant R hand d/t recent injury/surgery. .  Signs and symptoms are consistent with diagnosis of :  Closed displaced fracture of base of fifth metacarpal bone of right hand.  Pt and OT di Timed Treatment: 55 min     Total Treatment Time: 55 min     Based on clinical rationale and outcome measures, this evaluation involved High Complexity decision making due to 1-2 personal factors/comorbidities,  body structures involved/activity limitation care.    X___________________________________________________ Date____________________    Certification From: 79/5/0757  To:3/3/2021

## 2020-12-07 ENCOUNTER — OFFICE VISIT (OUTPATIENT)
Dept: OCCUPATIONAL MEDICINE | Facility: HOSPITAL | Age: 25
End: 2020-12-07
Attending: ORTHOPAEDIC SURGERY
Payer: COMMERCIAL

## 2020-12-07 PROCEDURE — 97140 MANUAL THERAPY 1/> REGIONS: CPT

## 2020-12-07 PROCEDURE — 97110 THERAPEUTIC EXERCISES: CPT

## 2020-12-07 NOTE — PROGRESS NOTES
Dx:  Closed displaced fracture of base of fifth metacarpal bone of right hand with delayed healing, subsequent encounter (S62.316G)       Insurance (Authorized # of Visits):  2/ 21          Authorizing Physician: Dr. Brook Nix  Next MD visit: none scheduled  F

## 2020-12-10 ENCOUNTER — OFFICE VISIT (OUTPATIENT)
Dept: OCCUPATIONAL MEDICINE | Facility: HOSPITAL | Age: 25
End: 2020-12-10
Attending: ORTHOPAEDIC SURGERY
Payer: COMMERCIAL

## 2020-12-10 PROCEDURE — 97140 MANUAL THERAPY 1/> REGIONS: CPT

## 2020-12-10 PROCEDURE — 97110 THERAPEUTIC EXERCISES: CPT

## 2020-12-11 RX ORDER — HYDROCODONE BITARTRATE AND ACETAMINOPHEN 5; 325 MG/1; MG/1
1 TABLET ORAL EVERY 6 HOURS PRN
Qty: 20 TABLET | Refills: 0 | Status: SHIPPED | OUTPATIENT
Start: 2020-12-11 | End: 2021-01-20

## 2020-12-11 RX ORDER — TRAMADOL HYDROCHLORIDE 50 MG/1
50 TABLET ORAL EVERY 6 HOURS PRN
Qty: 20 TABLET | Refills: 0 | Status: SHIPPED | OUTPATIENT
Start: 2020-12-11 | End: 2020-12-23

## 2020-12-11 NOTE — PROGRESS NOTES
Dx:  Closed displaced fracture of base of fifth metacarpal bone of right hand with delayed healing, subsequent encounter (S62.316G)       Insurance (Authorized # of Visits):  3/ 21          Authorizing Physician: Dr. Jeffrey Chase  Next MD visit: none scheduled  F finger off table      Reviewed HEP        Reviewed HEP      HEP: AROM R wrist and fingers    Charges:1x MT, 2x TE      Total Timed Treatment: 45 min  Total Treatment Time: 50 min

## 2020-12-14 ENCOUNTER — OFFICE VISIT (OUTPATIENT)
Dept: OCCUPATIONAL MEDICINE | Facility: HOSPITAL | Age: 25
End: 2020-12-14
Attending: ORTHOPAEDIC SURGERY
Payer: COMMERCIAL

## 2020-12-14 PROCEDURE — 97110 THERAPEUTIC EXERCISES: CPT

## 2020-12-14 PROCEDURE — 97140 MANUAL THERAPY 1/> REGIONS: CPT

## 2020-12-14 NOTE — PROGRESS NOTES
Dx:  Closed displaced fracture of base of fifth metacarpal bone of right hand with delayed healing, subsequent encounter (S62.316G)       Insurance (Authorized # of Visits):  4/ 21          Authorizing Physician: Dr. Ed Maciel MD visit: Dec 23, 2020  [facility] use.     R finger hyperext to tolerance finger off table  finger hyperext to tolerance finger off table Gentle R carpal jnt mobs     Reviewed HEP        Reviewed HEP      HEP: AROM R wrist and fingers    Charges:1x MT, 2x TE      Total Timed Treatment: 45

## 2020-12-17 ENCOUNTER — OFFICE VISIT (OUTPATIENT)
Dept: OCCUPATIONAL MEDICINE | Facility: HOSPITAL | Age: 25
End: 2020-12-17
Attending: ORTHOPAEDIC SURGERY
Payer: COMMERCIAL

## 2020-12-17 PROCEDURE — 97110 THERAPEUTIC EXERCISES: CPT

## 2020-12-17 PROCEDURE — 97140 MANUAL THERAPY 1/> REGIONS: CPT

## 2020-12-17 NOTE — PROGRESS NOTES
Dx:  Closed displaced fracture of base of fifth metacarpal bone of right hand with delayed healing, subsequent encounter (S62.316G)       Insurance (Authorized # of Visits):  5/ 21          Authorizing Physician: Dr. Mirtha Maciel MD visit: Dec 23, 2020  [facility] motions    R finger ab and add R finger ab and add R finger ab and add in air and on tabletop R finger ab and add in air and on tabletop    Kleenex scrunch R Kleenex scrunch R Talked about weaning   From 24/7 splint use.  Sulma R carpal jnjustin preciado

## 2020-12-21 ENCOUNTER — OFFICE VISIT (OUTPATIENT)
Dept: OCCUPATIONAL MEDICINE | Facility: HOSPITAL | Age: 25
End: 2020-12-21
Attending: ORTHOPAEDIC SURGERY
Payer: COMMERCIAL

## 2020-12-21 PROCEDURE — 97110 THERAPEUTIC EXERCISES: CPT

## 2020-12-21 PROCEDURE — 97140 MANUAL THERAPY 1/> REGIONS: CPT

## 2020-12-21 NOTE — PROGRESS NOTES
Dx:  Closed displaced fracture of base of fifth metacarpal bone of right hand with delayed healing, subsequent encounter (S62.316G)       Insurance (Authorized # of Visits): 6/ 21          Authorizing Physician: Dr. Benji Maciel MD visit: Dec 23, 2020  Fall (+5)   240(+12)  218 (+51)   195 (+72)  164  (+65)          Goals: (to be met in 21 visits)  1- decrease resting pain R hand to 2/10for general comfort 5 visits NOT MET. 2- Decrease C/O pain at worst to 4/10 for improved quality of sleep 6 visits.  NOT MET R fingers Edema massage with lotion R fingers and hand Edema massage with lotion R fingers and hand Edema massage with lotion R fingers and hand Edema massage with lotion R fingers and hand       AROM all R fingers/wrist/shoulder FA AROM all R fingers/wris

## 2020-12-23 ENCOUNTER — OFFICE VISIT (OUTPATIENT)
Dept: OCCUPATIONAL MEDICINE | Facility: HOSPITAL | Age: 25
End: 2020-12-23
Attending: ORTHOPAEDIC SURGERY
Payer: COMMERCIAL

## 2020-12-23 ENCOUNTER — OFFICE VISIT (OUTPATIENT)
Dept: ORTHOPEDICS CLINIC | Facility: CLINIC | Age: 25
End: 2020-12-23
Payer: COMMERCIAL

## 2020-12-23 VITALS — OXYGEN SATURATION: 99 % | HEART RATE: 92 BPM

## 2020-12-23 DIAGNOSIS — S62.346A CLOSED NONDISPLACED FRACTURE OF BASE OF FIFTH METACARPAL BONE OF RIGHT HAND, INITIAL ENCOUNTER: Primary | ICD-10-CM

## 2020-12-23 PROCEDURE — 97110 THERAPEUTIC EXERCISES: CPT

## 2020-12-23 PROCEDURE — 99024 POSTOP FOLLOW-UP VISIT: CPT | Performed by: ORTHOPAEDIC SURGERY

## 2020-12-23 PROCEDURE — 97140 MANUAL THERAPY 1/> REGIONS: CPT

## 2020-12-23 NOTE — PROGRESS NOTES
[facility] New Patient Note    CC: Status post open reduction and percutaneous pinning of right fifth metacarpal base fracture    DOI: 10/12/2020  Date of surgery: 11/13/2020    HPI: This 23year old right-hand-dominant male with metacarpal fracture • Ascorbic Acid (VITAMIN C) 1000 MG Oral Tab Take 1,000 mg by mouth daily. • MONTELUKAST SODIUM 10 MG Oral Tab TAKE 1 TABLET BY MOUTH EVERY DAY AT BEDTIME 90 tablet 1   • cetirizine 10 MG Oral Tab Take 10 mg by mouth daily.      • AUVI-Q 0.3 MG/0.3ML In Uncles   • Cancer Neg      Social History    Occupational History      Not on file    Tobacco Use      Smoking status: Former Smoker        Years: 3.50        Quit date: 2016        Years since quittin.9      Smokeless tobacco: Never Used Pulmonary/Chest: Effort normal. No stridor. No respiratory distress. Patient has no wheezes. Neurological: he is alert and oriented to person, place, and time. No cranial nerve deficit. Skin: Skin is warm and dry. Patient is not diaphoretic.    Psychiat

## 2020-12-23 NOTE — PROGRESS NOTES
Dx:  Closed displaced fracture of base of fifth metacarpal bone of right hand with delayed healing, subsequent encounter (S62.316G)       Insurance (Authorized # of Visits):  7/ 21          Authorizing Physician: Dr. Mirtha Key  Next MD visit:Jan 20, 2020  Fall R fingers/wrist/shoulder FA all motions AROM all R fingers/wrist/shoulder FA all motions AROM all R fingers/wrist/shoulder FA all motions PROM R wrist and fingers      R finger ab and add R finger ab and add R finger ab and add in air and on tabletop R fin

## 2020-12-29 ENCOUNTER — PATIENT MESSAGE (OUTPATIENT)
Dept: ORTHOPEDICS CLINIC | Facility: CLINIC | Age: 25
End: 2020-12-29

## 2020-12-29 NOTE — TELEPHONE ENCOUNTER
From: Poppy Pacheco  To: Tavon Doe MD  Sent: 12/29/2020 3:47 AM CST  Subject: Visit Follow-up Question    Dr Bridgette Allison,     I am writing you to inform you that I unfortunately still have a lot of pain around the area where the pins were.  This has be

## 2020-12-30 RX ORDER — HYDROCODONE BITARTRATE AND ACETAMINOPHEN 5; 325 MG/1; MG/1
1 TABLET ORAL EVERY 6 HOURS PRN
Qty: 20 TABLET | Refills: 0 | Status: SHIPPED | OUTPATIENT
Start: 2020-12-30 | End: 2021-01-20

## 2021-01-08 ENCOUNTER — OFFICE VISIT (OUTPATIENT)
Dept: OCCUPATIONAL MEDICINE | Facility: HOSPITAL | Age: 26
End: 2021-01-08
Attending: ORTHOPAEDIC SURGERY
Payer: COMMERCIAL

## 2021-01-08 PROCEDURE — 97140 MANUAL THERAPY 1/> REGIONS: CPT

## 2021-01-08 PROCEDURE — 97110 THERAPEUTIC EXERCISES: CPT

## 2021-01-08 NOTE — PROGRESS NOTES
Dx:  Closed displaced fracture of base of fifth metacarpal bone of right hand with delayed healing, subsequent encounter (S62.316G)       Insurance (Authorized # of Visits):  8/ 21          [id number] [address] Physician: Dr. Melanie Dhillon  Next MD visit:Jan 20, 2020  Fall with lotion R fingers and hand Edema massage with lotion R fingers and hand Edema massage with lotion R fingers and hand Edema massage with lotion R fingers and hand     AROM all R fingers/wrist/shoulder FA AROM all R fingers/wrist/shoulder FA AROM all R f

## 2021-01-11 ENCOUNTER — OFFICE VISIT (OUTPATIENT)
Dept: OCCUPATIONAL MEDICINE | Facility: HOSPITAL | Age: 26
End: 2021-01-11
Attending: ORTHOPAEDIC SURGERY
Payer: COMMERCIAL

## 2021-01-11 PROCEDURE — 97110 THERAPEUTIC EXERCISES: CPT

## 2021-01-11 PROCEDURE — 97140 MANUAL THERAPY 1/> REGIONS: CPT

## 2021-01-11 NOTE — PROGRESS NOTES
Dx:  Closed displaced fracture of base of fifth metacarpal bone of right hand with delayed healing, subsequent encounter (S62.316G)       Insurance (Authorized # of Visits):  9/ 21          [id number] [address] [place] Physician: Dr. Sary Askew  Next MD visit:Jan 20, 2020  Fall Edema massage with lotion R fingers and hand Edema massage with lotion R fingers and hand Edema massage with lotion R fingers and hand    AROM all R fingers/wrist/shoulder FA AROM all R fingers/wrist/shoulder FA AROM all R fingers/wrist/shoulder FA all mot

## 2021-01-13 ENCOUNTER — OFFICE VISIT (OUTPATIENT)
Dept: OCCUPATIONAL MEDICINE | Facility: HOSPITAL | Age: 26
End: 2021-01-13
Attending: ORTHOPAEDIC SURGERY
Payer: COMMERCIAL

## 2021-01-13 PROCEDURE — 97110 THERAPEUTIC EXERCISES: CPT

## 2021-01-13 PROCEDURE — 97140 MANUAL THERAPY 1/> REGIONS: CPT

## 2021-01-13 NOTE — PROGRESS NOTES
Dx:  Closed displaced fracture of base of fifth metacarpal bone of right hand with delayed healing, subsequent encounter (S62.316G)       Insurance (Authorized # of Visits):  9/ 21          [id number] [address] [place] Physician: Dr. Charlotte Maciel MD visit:Jan 20, 2020  Fall 27(+7)  240(+22)    226 (+31)  225   (+61)       Goals: (to be met in 21 visits)  1- decrease resting pain R hand to 2/10 for general comfort 5 visits MET  2- Decrease C/O pain at worst to 4/10 for improved quality of sleep 6 visits.   MET  3- increase R fi R hand   Gentle PROM R fingers Edema massage with lotion R fingers and hand Edema massage with lotion R fingers and hand Edema massage with lotion R fingers and hand Edema massage with lotion R fingers and hand Edema massage with lotion R fingers and hand

## 2021-01-19 ENCOUNTER — APPOINTMENT (OUTPATIENT)
Dept: OCCUPATIONAL MEDICINE | Facility: HOSPITAL | Age: 26
End: 2021-01-19
Attending: ORTHOPAEDIC SURGERY
Payer: COMMERCIAL

## 2021-01-20 ENCOUNTER — HOSPITAL ENCOUNTER (OUTPATIENT)
Dept: GENERAL RADIOLOGY | Age: 26
Discharge: HOME | End: 2021-01-20
Attending: ORTHOPAEDIC SURGERY
Payer: COMMERCIAL

## 2021-01-20 ENCOUNTER — OFFICE VISIT (OUTPATIENT)
Dept: ORTHOPEDICS CLINIC | Facility: CLINIC | Age: 26
End: 2021-01-20
Payer: COMMERCIAL

## 2021-01-20 VITALS — OXYGEN SATURATION: 99 % | HEART RATE: 93 BPM

## 2021-01-20 DIAGNOSIS — S62.346A CLOSED NONDISPLACED FRACTURE OF BASE OF FIFTH METACARPAL BONE OF RIGHT HAND, INITIAL ENCOUNTER: ICD-10-CM

## 2021-01-20 DIAGNOSIS — S62.346A CLOSED NONDISPLACED FRACTURE OF BASE OF FIFTH METACARPAL BONE OF RIGHT HAND, INITIAL ENCOUNTER: Primary | ICD-10-CM

## 2021-01-20 PROCEDURE — 20550 NJX 1 TENDON SHEATH/LIGAMENT: CPT | Performed by: ORTHOPAEDIC SURGERY

## 2021-01-20 PROCEDURE — 73130 X-RAY EXAM OF HAND: CPT | Performed by: ORTHOPAEDIC SURGERY

## 2021-01-20 PROCEDURE — 99024 POSTOP FOLLOW-UP VISIT: CPT | Performed by: ORTHOPAEDIC SURGERY

## 2021-01-20 RX ORDER — TRIAMCINOLONE ACETONIDE 40 MG/ML
40 INJECTION, SUSPENSION INTRA-ARTICULAR; INTRAMUSCULAR ONCE
Status: COMPLETED | OUTPATIENT
Start: 2021-01-20 | End: 2021-01-20

## 2021-01-20 RX ADMIN — TRIAMCINOLONE ACETONIDE 40 MG: 40 INJECTION, SUSPENSION INTRA-ARTICULAR; INTRAMUSCULAR at 10:12:00

## 2021-01-20 NOTE — PROGRESS NOTES
[facility] New Patient Note    CC: Status post open reduction and percutaneous pinning of right fifth metacarpal base fracture    DOI: 10/12/2020  Date of surgery: 11/13/2020    HPI: This 23year old right-hand-dominant male with metacarpal fracture • AUVI-Q 0.3 MG/0.3ML Injection Solution Auto-injector Inject 0.3 mL (1 each total) as directed as needed. 1 each 0   • Azelastine HCl 0.1 % Nasal Solution 2 sprays by Nasal route 2 (two) times daily.  1 Bottle 11   • Olopatadine HCl (PATADAY) 0.2 % Ophthal Smokeless tobacco: Never Used      Tobacco comment: electronic cigarettes    Substance and Sexual Activity      Alcohol use: Not Currently        Alcohol/week: 0.0 standard drinks      Drug use: Yes        Types: Cannabis        Comment: medically pers Skin: Skin is warm and dry. Patient is not diaphoretic. Psychiatric: Patient has a normal mood and affect and behavior is normal. Judgment and thought content normal.     Right upper extremity: Skin intact. Sensation is intact over the digits.   No ecchym

## 2021-01-20 NOTE — PROCEDURES
Written consent was obtained. Skin was prepped with ChloraPrep. 1 mL of 40 mg of Kenalog and 1 mL of 1% lidocaine was injected into the right ulnar aspect of the hand along small finger EDC/EDMt. Patient tolerated the procedure.   No complications were e

## 2021-01-26 ENCOUNTER — APPOINTMENT (OUTPATIENT)
Dept: OCCUPATIONAL MEDICINE | Facility: HOSPITAL | Age: 26
End: 2021-01-26
Attending: ORTHOPAEDIC SURGERY
Payer: COMMERCIAL

## 2021-01-29 ENCOUNTER — OFFICE VISIT (OUTPATIENT)
Dept: OCCUPATIONAL MEDICINE | Facility: HOSPITAL | Age: 26
End: 2021-01-29
Attending: ORTHOPAEDIC SURGERY
Payer: COMMERCIAL

## 2021-01-29 PROCEDURE — 97110 THERAPEUTIC EXERCISES: CPT

## 2021-01-29 PROCEDURE — 97140 MANUAL THERAPY 1/> REGIONS: CPT

## 2021-01-29 NOTE — PROGRESS NOTES
Dx:  Closed displaced fracture of base of fifth metacarpal bone of right hand with delayed healing, subsequent encounter (S62.316G)       Insurance (Authorized # of Visits):  11/ 21          Authorizing Physician: Dr. Mary Beth Maciel MD visit:Feb 17, 2020  Malika Chaparro Edema massage with lotion R fingers and hand Edema massage with lotion R fingers and hand Edema massage with lotion R fingers and hand Edema massage with lotion R  hand       AROM all R fingers/wrist/shoulder FA AROM all R fingers/wrist/shoulder FA AROM al

## 2021-02-02 ENCOUNTER — OFFICE VISIT (OUTPATIENT)
Dept: OCCUPATIONAL MEDICINE | Facility: HOSPITAL | Age: 26
End: 2021-02-02
Attending: EMERGENCY MEDICINE
Payer: COMMERCIAL

## 2021-02-02 PROCEDURE — 97110 THERAPEUTIC EXERCISES: CPT

## 2021-02-02 PROCEDURE — 97140 MANUAL THERAPY 1/> REGIONS: CPT

## 2021-02-02 NOTE — PROGRESS NOTES
Dx:  Closed displaced fracture of base of fifth metacarpal bone of right hand with delayed healing, subsequent encounter (S62.316G)       Insurance (Authorized # of Visits):  12/ 21          Authorizing Physician: Dr. Dr Guerita Faith Next MD visit:Feb 16, 2020  F fingers and hand Edema massage with lotion R fingers and hand Edema massage with lotion R fingers and hand Edema massage with lotion R fingers and hand Edema massage with lotion R fingers and hand Edema massage with lotion R  hand  STM for scar massage latasha fingers    Charges:1x MT, 2x TE      Total Timed Treatment: 45 min  Total Treatment Time: 50 min

## 2021-02-05 ENCOUNTER — OFFICE VISIT (OUTPATIENT)
Dept: OCCUPATIONAL MEDICINE | Facility: HOSPITAL | Age: 26
End: 2021-02-05
Attending: EMERGENCY MEDICINE
Payer: COMMERCIAL

## 2021-02-05 PROCEDURE — 97140 MANUAL THERAPY 1/> REGIONS: CPT

## 2021-02-05 PROCEDURE — 97110 THERAPEUTIC EXERCISES: CPT

## 2021-02-05 NOTE — PROGRESS NOTES
Dx:  Closed displaced fracture of base of fifth metacarpal bone of right hand with delayed healing, subsequent encounter (S62.316G)       Insurance (Authorized # of Visits):  13/ 21          Authorizing Physician: Dr. Dr Kel Mcdowell Next MD visit:Feb 16, 2020  F with lotion R fingers and hand Edema massage with lotion R fingers and hand Edema massage with lotion R fingers and hand Edema massage with lotion R fingers and hand Edema massage with lotion R fingers and hand Edema massage with lotion R fingers and hand wrist ext. yel puttycize lrg knob R yel puttycize lrg knob R yel yel puttycize lrg knob R  Issued yel putty for HEP yel puttycize lrg carmenob R jamaton to Ulnr R wrist and wrist extensors. Reviewed HEP      Flatten yel putty wrist ext.  Flatten yel putty w

## 2021-02-09 ENCOUNTER — TELEPHONE (OUTPATIENT)
Dept: PHYSICAL THERAPY | Facility: HOSPITAL | Age: 26
End: 2021-02-09

## 2021-02-09 ENCOUNTER — APPOINTMENT (OUTPATIENT)
Dept: OCCUPATIONAL MEDICINE | Facility: HOSPITAL | Age: 26
End: 2021-02-09
Attending: EMERGENCY MEDICINE
Payer: COMMERCIAL

## 2021-02-11 ENCOUNTER — TELEPHONE (OUTPATIENT)
Dept: INTERNAL MEDICINE CLINIC | Facility: CLINIC | Age: 26
End: 2021-02-11

## 2021-02-11 DIAGNOSIS — H10.13 ALLERGIC CONJUNCTIVITIS OF BOTH EYES: Primary | ICD-10-CM

## 2021-02-11 DIAGNOSIS — J01.41 ACUTE RECURRENT PANSINUSITIS: ICD-10-CM

## 2021-02-11 NOTE — TELEPHONE ENCOUNTER
pts mother contacted office inquiring about second ENT referral for ENT for second opinion   Referral placed into pt chart. Contact info for referral provided to mother lazaro Jaramillo M.D.   [address] [address]

## 2021-02-15 ENCOUNTER — OFFICE VISIT (OUTPATIENT)
Dept: OCCUPATIONAL MEDICINE | Facility: HOSPITAL | Age: 26
End: 2021-02-15
Attending: EMERGENCY MEDICINE
Payer: COMMERCIAL

## 2021-02-15 PROCEDURE — 97140 MANUAL THERAPY 1/> REGIONS: CPT

## 2021-02-15 PROCEDURE — 97110 THERAPEUTIC EXERCISES: CPT

## 2021-02-15 NOTE — PROGRESS NOTES
Dx:  Closed displaced fracture of base of fifth metacarpal bone of right hand with delayed healing, subsequent encounter (S62.316G)       Insurance (Authorized # of Visits):  13/ 21          Authorizing Physician: Dr. Dr Yoli Gil Next MD visit:Feb 16, 2020  F steering wheel, position hand to type on computer, write, eat  14 visits. Improving,  MET. 5- begin strengthening once MD approves. ONGOING  6- NEW: 2/15/21: increase R wrist flx to 60 degrees to ease heavy lifting of household items. 21 visits.   7- NEW: fingers and hand Edema massage with lotion R fingers and hand Edema massage with lotion R fingers and hand Edema massage with lotion R fingers and hand Edema massage with lotion R fingers and hand Edema massage with lotion R fingers and hand Edema massage twist stik 3 directions Red and green twist stik 3 directions Red and gr twist stick R 3 directions. New measurements for wed MDF/U visit.    Reviewed HEP   Scheduled future appts  Flatten yel putty Flatten yel putty wrist ext. yel puttycize deanag olga sexton

## 2021-02-17 ENCOUNTER — OFFICE VISIT (OUTPATIENT)
Dept: OCCUPATIONAL MEDICINE | Facility: HOSPITAL | Age: 26
End: 2021-02-17
Attending: EMERGENCY MEDICINE
Payer: COMMERCIAL

## 2021-02-17 ENCOUNTER — OFFICE VISIT (OUTPATIENT)
Dept: ORTHOPEDICS CLINIC | Facility: CLINIC | Age: 26
End: 2021-02-17
Payer: COMMERCIAL

## 2021-02-17 VITALS — OXYGEN SATURATION: 98 % | HEART RATE: 98 BPM

## 2021-02-17 DIAGNOSIS — S62.346A CLOSED NONDISPLACED FRACTURE OF BASE OF FIFTH METACARPAL BONE OF RIGHT HAND, INITIAL ENCOUNTER: Primary | ICD-10-CM

## 2021-02-17 PROCEDURE — 99212 OFFICE O/P EST SF 10 MIN: CPT | Performed by: ORTHOPAEDIC SURGERY

## 2021-02-17 PROCEDURE — 97140 MANUAL THERAPY 1/> REGIONS: CPT

## 2021-02-17 PROCEDURE — 97110 THERAPEUTIC EXERCISES: CPT

## 2021-02-17 NOTE — PROGRESS NOTES
Dx:  Closed displaced fracture of base of fifth metacarpal bone of right hand with delayed healing, subsequent encounter (S62.316G)       Insurance (Authorized # of Visits):  15/ 21          Authorizing Physician: Dr. Dr Jeffrey Maciel MD visit:March 17, 2020 hand Hot Pk R hand Hot Pk R hand Hot Pk R hand Hot Pk R hand Hot Pk R hand Hot Pk R hand Hot Pk R hand Hot Pk R hand Hot Pk R hand      Gentle PROM R fingers Edema massage with lotion R fingers and hand Edema massage with lotion R fingers and hand Edema ma tolerance finger off table Gentle R carpal jnt mobs Blocking R finger PIP and DIP jnt ex measurements Beige twist stik 3 directions Beige twist stik 3 directions Beige twist stik 3 directions Beige twist stik 3 directions Beige twist stik 3 directions Red

## 2021-02-17 NOTE — PROGRESS NOTES
[facility] New Patient Note    CC: Status post open reduction and percutaneous pinning of right fifth metacarpal base fracture    DOI: 10/12/2020  Date of surgery: 11/13/2020    HPI: This 23year old right-hand-dominant male with metacarpal fracture cetirizine 10 MG Oral Tab Take 10 mg by mouth daily. • AUVI-Q 0.3 MG/0.3ML Injection Solution Auto-injector Inject 0.3 mL (1 each total) as directed as needed.  1 each 0   • Azelastine HCl 0.1 % Nasal Solution 2 sprays by Nasal route 2 (two) times daily 2016        Years since quittin.1      Smokeless tobacco: Never Used      Tobacco comment: electronic cigarettes    Substance and Sexual Activity      Alcohol use: Not Currently        Alcohol/week: 0.0 standard drinks      Drug use: Yes        Ty nerve deficit. Skin: Skin is warm and dry. Patient is not diaphoretic. Psychiatric: Patient has a normal mood and affect and behavior is normal. Judgment and thought content normal.     Right upper extremity: Skin intact.  Sensation is intact over the d

## 2021-02-22 ENCOUNTER — TELEPHONE (OUTPATIENT)
Dept: PHYSICAL THERAPY | Facility: HOSPITAL | Age: 26
End: 2021-02-22

## 2021-02-22 ENCOUNTER — APPOINTMENT (OUTPATIENT)
Dept: OCCUPATIONAL MEDICINE | Facility: HOSPITAL | Age: 26
End: 2021-02-22
Attending: EMERGENCY MEDICINE
Payer: COMMERCIAL

## 2021-02-24 ENCOUNTER — OFFICE VISIT (OUTPATIENT)
Dept: OCCUPATIONAL MEDICINE | Facility: HOSPITAL | Age: 26
End: 2021-02-24
Attending: EMERGENCY MEDICINE
Payer: COMMERCIAL

## 2021-02-24 PROCEDURE — 97140 MANUAL THERAPY 1/> REGIONS: CPT

## 2021-02-24 PROCEDURE — 97110 THERAPEUTIC EXERCISES: CPT

## 2021-02-24 NOTE — PROGRESS NOTES
Dx:  Closed displaced fracture of base of fifth metacarpal bone of right hand with delayed healing, subsequent encounter (S62.316G)       Insurance (Authorized # of Visits):  16/ 21          Authorizing Physician: Dr. Dr Mirtha Key Next MD visit:March 17, 2020 Pk R hand Hot Pk R hand Hot Pk R hand Hot Pk R hand     Gentle PROM R fingers Edema massage with lotion R fingers and hand Edema massage with lotion R fingers and hand Edema massage with lotion R fingers and hand Edema massage with lotion R fingers and ferris direction  PROM R wrist and fingers     R finger hyperext to tolerance finger off table  finger hyperext to tolerance finger off table Gentle R carpal jnt mobs Blocking R finger PIP and DIP jnt ex measurements Beige twist stik 3 directions Beige twist stik

## 2021-03-01 ENCOUNTER — OFFICE VISIT (OUTPATIENT)
Dept: OCCUPATIONAL MEDICINE | Facility: HOSPITAL | Age: 26
End: 2021-03-01
Attending: EMERGENCY MEDICINE
Payer: COMMERCIAL

## 2021-03-01 PROCEDURE — 97110 THERAPEUTIC EXERCISES: CPT

## 2021-03-01 PROCEDURE — 97140 MANUAL THERAPY 1/> REGIONS: CPT

## 2021-03-01 NOTE — PROGRESS NOTES
Dx:  Closed displaced fracture of base of fifth metacarpal bone of right hand with delayed healing, subsequent encounter (S62.316G)       Insurance (Authorized # of Visits):  17/ 21          Authorizing Physician: Dr. Dr Krysten Maciel MD visit: March 17, 2020 Hot Pk R hand Hot Pk R hand Hot Pk R hand Hot Pk R hand Hot Pk R hand Hot Pk R hand Hot Pk R hand Hot Pk R hand Hot Pk R hand    Gentle PROM R fingers Edema massage with lotion R fingers and hand Edema massage with lotion R fingers and hand Edema massage w AROM R wrist and fingers AROM R wrist and fingers AROM R wrist and fingers Red putty  and yel putty pinch and flatten R Red twist stik 3 direction  PROM R wrist and fingers Red twist stik 3 direction  PROM R wrist and fingers Red twist stik 3 direction

## 2021-03-03 ENCOUNTER — LAB ENCOUNTER (OUTPATIENT)
Dept: LAB | Facility: HOSPITAL | Age: 26
End: 2021-03-03
Attending: ALLERGY & IMMUNOLOGY
Payer: COMMERCIAL

## 2021-03-03 ENCOUNTER — APPOINTMENT (OUTPATIENT)
Dept: OCCUPATIONAL MEDICINE | Facility: HOSPITAL | Age: 26
End: 2021-03-03
Attending: EMERGENCY MEDICINE
Payer: COMMERCIAL

## 2021-03-03 DIAGNOSIS — J32.9 CHRONIC SINUSITIS, UNSPECIFIED LOCATION: ICD-10-CM

## 2021-03-03 DIAGNOSIS — B99.9 RECURRENT INFECTIONS: ICD-10-CM

## 2021-03-03 LAB
IGA SERPL-MCNC: 205 MG/DL (ref 70–312)
IGM SERPL-MCNC: 85.9 MG/DL (ref 43–279)
IMMUNOGLOBULIN PNL SER-MCNC: 827 MG/DL (ref 791–1643)

## 2021-03-03 PROCEDURE — 86317 IMMUNOASSAY INFECTIOUS AGENT: CPT

## 2021-03-03 PROCEDURE — 82787 IGG 1 2 3 OR 4 EACH: CPT

## 2021-03-03 PROCEDURE — 86648 DIPHTHERIA ANTIBODY: CPT

## 2021-03-03 PROCEDURE — 86941 HEMOLYSINS/AGGLUTININS: CPT

## 2021-03-03 PROCEDURE — 86900 BLOOD TYPING SEROLOGIC ABO: CPT

## 2021-03-03 PROCEDURE — 82784 ASSAY IGA/IGD/IGG/IGM EACH: CPT

## 2021-03-03 PROCEDURE — 36415 COLL VENOUS BLD VENIPUNCTURE: CPT

## 2021-03-03 PROCEDURE — 86774 TETANUS ANTIBODY: CPT

## 2021-03-05 LAB
IMMUNOGLOBULIN G SUBCLASS 1: 430 MG/DL
IMMUNOGLOBULIN G SUBCLASS 2: 235 MG/DL
IMMUNOGLOBULIN G SUBCLASS 3: 26 MG/DL
IMMUNOGLOBULIN G SUBCLASS 4: 38 MG/DL
ISO ANTI A TITIER IGG: 512
ISO ANTI A TITIER IGM: 512
ISO ANTI B TITIER IGG: 128
ISO ANTI B TITIER IGM: 256

## 2021-03-06 LAB
DIPHTHERIA ANTIBODY, IGG: 0 IU/ML
PNEUMOCOCCAL SEROTYPE 1 IGG: 7 UG/ML
PNEUMOCOCCAL SEROTYPE 10A IGG: 0.65 UG/ML
PNEUMOCOCCAL SEROTYPE 11A IGG: 0.37 UG/ML
PNEUMOCOCCAL SEROTYPE 12F IGG: 0.16 UG/ML
PNEUMOCOCCAL SEROTYPE 14* IGG: 0.28 UG/ML
PNEUMOCOCCAL SEROTYPE 15B IGG: 4.86 UG/ML
PNEUMOCOCCAL SEROTYPE 17F IGG: 2.88 UG/ML
PNEUMOCOCCAL SEROTYPE 18C* IGG: 0.85 UG/ML
PNEUMOCOCCAL SEROTYPE 19A IGG: 1.64 UG/ML
PNEUMOCOCCAL SEROTYPE 19F* IGG: 1.43 UG/ML
PNEUMOCOCCAL SEROTYPE 2 IGG: 3.34 UG/ML
PNEUMOCOCCAL SEROTYPE 20 IGG: 2.34 UG/ML
PNEUMOCOCCAL SEROTYPE 22F IGG: 0 UG/ML
PNEUMOCOCCAL SEROTYPE 23F* IGG: 2.89 UG/ML
PNEUMOCOCCAL SEROTYPE 3 IGG: 1.06 UG/ML
PNEUMOCOCCAL SEROTYPE 33F IGG: 0.32 UG/ML
PNEUMOCOCCAL SEROTYPE 4* IGG: 5.61 UG/ML
PNEUMOCOCCAL SEROTYPE 5 IGG: 0.67 UG/ML
PNEUMOCOCCAL SEROTYPE 6B* IGG: 1.63 UG/ML
PNEUMOCOCCAL SEROTYPE 7F IGG: 0.72 UG/ML
PNEUMOCOCCAL SEROTYPE 8 IGG: 0.36 UG/ML
PNEUMOCOCCAL SEROTYPE 9N IGG: 0.45 UG/ML
PNEUMOCOCCAL SEROTYPE 9V*, IGG: 2.83 UG/ML
TETANUS ANTIBODY, IGG: 0.5 IU/ML

## 2021-03-08 NOTE — PROGRESS NOTES
Allergy RN to notify:  -Immunity appears to be low - not terrible by any means,but could be better.   In order to further assess:  -Recommend PPV23 and post HISS in 4-6wks    Thanks,    TVO

## 2021-03-09 ENCOUNTER — OFFICE VISIT (OUTPATIENT)
Dept: OCCUPATIONAL MEDICINE | Facility: HOSPITAL | Age: 26
End: 2021-03-09
Attending: EMERGENCY MEDICINE
Payer: COMMERCIAL

## 2021-03-09 PROCEDURE — 97140 MANUAL THERAPY 1/> REGIONS: CPT

## 2021-03-09 PROCEDURE — 97110 THERAPEUTIC EXERCISES: CPT

## 2021-03-09 NOTE — PROGRESS NOTES
Dx:  Closed displaced fracture of base of fifth metacarpal bone of right hand with delayed healing, subsequent encounter (S62.316G)       Insurance (Authorized # of Visits):  18/ 21          Authorizing Physician: Dr. Dr Griffin Brar Next MD visit: March 17, 2020 2/24/21  TX16 3/1/21  TX: 17 3/9/21  TX: 18       Hot doron R fingers Hot Pk R hand Hot Pk R hand Hot Pk R hand Hot Pk R hand Hot Pk R hand Hot Pk R hand Hot Pk R hand Hot Pk R hand Hot Pk R hand Hot Pk R hand Hot Pk R hand Hot Pk R hand Hot Pk R hand Hot Pk Kleenex scrunch R Kleenex scrunch R Talked about weaning   From 24/7 splint use.  Gentle R carpal jnt mobs Blocking R finger PIP and DIP jnt ex AROM all R fingers/wrist/shoulder FA all motions AROM all R fingers/wrist/shoulder FA all motions AROM all R fi

## 2021-03-11 ENCOUNTER — APPOINTMENT (OUTPATIENT)
Dept: OCCUPATIONAL MEDICINE | Facility: HOSPITAL | Age: 26
End: 2021-03-11
Attending: EMERGENCY MEDICINE
Payer: COMMERCIAL

## 2021-03-11 ENCOUNTER — TELEPHONE (OUTPATIENT)
Dept: PHYSICAL THERAPY | Facility: HOSPITAL | Age: 26
End: 2021-03-11

## 2021-03-16 ENCOUNTER — OFFICE VISIT (OUTPATIENT)
Dept: OCCUPATIONAL MEDICINE | Facility: HOSPITAL | Age: 26
End: 2021-03-16
Attending: EMERGENCY MEDICINE
Payer: COMMERCIAL

## 2021-03-16 NOTE — PROGRESS NOTES
Dx:  Closed displaced fracture of base of fifth metacarpal bone of right hand with delayed healing, subsequent encounter (S62.316G)       Insurance (Authorized # of Visits):  19/ 21          [id number] [address] [place] Physician: Dr. Dr Anneliese Lynn Next MD visit: March 17, 2020 ease holding steering wheel, position hand to type on computer, write, eat  14 visits. Improving,  MET. 5- begin strengthening once MD approves. MET  6- NEW: 2/15/21: increase R wrist flx to 60 degrees to ease heavy lifting of household items. 21 visits. hand STM for scar massage dorsal R hand STM for scar massage dorsal R hand STM for scar massage dorsal R hand STM for scar massage dorsal R hand     AROM all R fingers/wrist/shoulder FA AROM all R fingers/wrist/shoulder FA AROM all R fingers/wrist/shoulder R graston to Ulnr R wrist and wrist extensors  Final measurements for MD visit.      R finger hyperext to tolerance finger off table  finger hyperext to tolerance finger off table Gentle R carpal jnt mobs Blocking R finger PIP and DIP jnt ex measurements Be

## 2021-03-17 ENCOUNTER — OFFICE VISIT (OUTPATIENT)
Dept: ORTHOPEDICS CLINIC | Facility: CLINIC | Age: 26
End: 2021-03-17
Payer: COMMERCIAL

## 2021-03-17 VITALS — OXYGEN SATURATION: 98 % | HEART RATE: 101 BPM

## 2021-03-17 DIAGNOSIS — S62.346A CLOSED NONDISPLACED FRACTURE OF BASE OF FIFTH METACARPAL BONE OF RIGHT HAND, INITIAL ENCOUNTER: Primary | ICD-10-CM

## 2021-03-17 PROCEDURE — 99212 OFFICE O/P EST SF 10 MIN: CPT | Performed by: ORTHOPAEDIC SURGERY

## 2021-03-17 NOTE — PROGRESS NOTES
[facility] New Patient Note    CC: Status post open reduction and percutaneous pinning of right fifth metacarpal base fracture    DOI: 10/12/2020  Date of surgery: 11/13/2020    HPI: This 23year old right-hand-dominant male with metacarpal fracture 10 mg by mouth daily. • AUVI-Q 0.3 MG/0.3ML Injection Solution Auto-injector Inject 0.3 mL (1 each total) as directed as needed. 1 each 0   • Olopatadine HCl (PATADAY) 0.2 % Ophthalmic Solution Place 1 drop into both eyes every morning.  3 Bottle 2   • Use      Vaping Use: Former    Substance and Sexual Activity      Alcohol use: Not Currently        Alcohol/week: 0.0 standard drinks      Drug use: Yes        Types: Cannabis        Comment: medically perscribed marijuana for depression treatment      Sex has a normal mood and affect and behavior is normal. Judgment and thought content normal.     Right upper extremity: Skin intact. Sensation is intact over the digits. No ecchymosis or bruising is noted. He is able to make a full fist actively.   He is abl

## 2021-04-09 DIAGNOSIS — Z23 NEED FOR VACCINATION: ICD-10-CM

## 2021-04-12 ENCOUNTER — IMMUNIZATION (OUTPATIENT)
Dept: LAB | Age: 26
End: 2021-04-12
Attending: HOSPITALIST
Payer: COMMERCIAL

## 2021-04-12 DIAGNOSIS — Z23 NEED FOR VACCINATION: Primary | ICD-10-CM

## 2021-04-12 PROCEDURE — 0001A SARSCOV2 VAC 30MCG/0.3ML IM: CPT

## 2021-05-01 ENCOUNTER — MED REC SCAN ONLY (OUTPATIENT)
Dept: ORTHOPEDICS CLINIC | Facility: CLINIC | Age: 26
End: 2021-05-01

## 2021-05-03 ENCOUNTER — IMMUNIZATION (OUTPATIENT)
Dept: LAB | Age: 26
End: 2021-05-03
Attending: HOSPITALIST
Payer: COMMERCIAL

## 2021-05-03 DIAGNOSIS — Z23 NEED FOR VACCINATION: Primary | ICD-10-CM

## 2021-05-03 PROCEDURE — 0002A SARSCOV2 VAC 30MCG/0.3ML IM: CPT

## 2021-05-06 ENCOUNTER — HOSPITAL ENCOUNTER (OUTPATIENT)
Dept: GENERAL RADIOLOGY | Age: 26
Discharge: HOME | End: 2021-05-06
Attending: ORTHOPAEDIC SURGERY
Payer: COMMERCIAL

## 2021-05-06 ENCOUNTER — OFFICE VISIT (OUTPATIENT)
Dept: ORTHOPEDICS CLINIC | Facility: CLINIC | Age: 26
End: 2021-05-06
Payer: COMMERCIAL

## 2021-05-06 VITALS — HEART RATE: 82 BPM | OXYGEN SATURATION: 100 %

## 2021-05-06 DIAGNOSIS — S62.346A CLOSED NONDISPLACED FRACTURE OF BASE OF FIFTH METACARPAL BONE OF RIGHT HAND, INITIAL ENCOUNTER: Primary | ICD-10-CM

## 2021-05-06 DIAGNOSIS — S62.346A CLOSED NONDISPLACED FRACTURE OF BASE OF FIFTH METACARPAL BONE OF RIGHT HAND, INITIAL ENCOUNTER: ICD-10-CM

## 2021-05-06 PROCEDURE — 99213 OFFICE O/P EST LOW 20 MIN: CPT | Performed by: ORTHOPAEDIC SURGERY

## 2021-05-06 PROCEDURE — 73130 X-RAY EXAM OF HAND: CPT | Performed by: ORTHOPAEDIC SURGERY

## 2021-05-07 ENCOUNTER — LAB ENCOUNTER (OUTPATIENT)
Dept: LAB | Facility: HOSPITAL | Age: 26
End: 2021-05-07
Attending: ALLERGY & IMMUNOLOGY
Payer: COMMERCIAL

## 2021-05-07 DIAGNOSIS — Z23 NEED FOR PROPHYLACTIC VACCINATION AGAINST STREPTOCOCCUS PNEUMONIAE (PNEUMOCOCCUS): ICD-10-CM

## 2021-05-07 PROCEDURE — 36415 COLL VENOUS BLD VENIPUNCTURE: CPT

## 2021-05-07 PROCEDURE — 86317 IMMUNOASSAY INFECTIOUS AGENT: CPT

## 2021-05-11 NOTE — PROGRESS NOTES
Allergy RN to notify:  -Immunity looks excellent  -No further immunology testing recommended at this time  -Advise follow up visit if questions and/or other issues    Thanks,    CHARLINE

## 2021-05-31 NOTE — PROGRESS NOTES
[facility] New Patient Note    CC: Status post open reduction and percutaneous pinning of right fifth metacarpal base fracture    DOI: 10/12/2020  Date of surgery: 11/13/2020    HPI: This 23year old right-hand-dominant male with metacarpal fracture MG/0.3ML Injection Solution Auto-injector Inject 0.3 mL (1 each total) as directed as needed. 1 each 0   • Olopatadine HCl (PATADAY) 0.2 % Ophthalmic Solution Place 1 drop into both eyes every morning.  3 Bottle 2   • Albuterol Sulfate  (90 Base) MCG Substance and Sexual Activity      Alcohol use: Not Currently        Alcohol/week: 0.0 standard drinks      Drug use: Yes        Types: Cannabis        Comment: medically perscribed marijuana for depression treatment      Sexual activity: Not on file and behavior is normal. Judgment and thought content normal.     Right upper extremity: Skin intact. Sensation is intact over the digits. No ecchymosis or bruising is noted. He is able to make a full fist actively.   He is able to extend his small finger as-needed basis. Ira Greene MD  Hand, Wrist, & Elbow Surgery  [facility]  [facility] [address] [facility], [address]. [facility]  [email or web address]  t: [id number]  f: [phone]

## 2021-07-01 ENCOUNTER — TELEPHONE (OUTPATIENT)
Dept: ORTHOPEDICS CLINIC | Facility: CLINIC | Age: 26
End: 2021-07-01

## 2021-07-01 ENCOUNTER — HOSPITAL ENCOUNTER (OUTPATIENT)
Dept: GENERAL RADIOLOGY | Age: 26
Discharge: HOME | End: 2021-07-01
Attending: ORTHOPAEDIC SURGERY
Payer: COMMERCIAL

## 2021-07-01 ENCOUNTER — OFFICE VISIT (OUTPATIENT)
Dept: ORTHOPEDICS CLINIC | Facility: CLINIC | Age: 26
End: 2021-07-01
Payer: COMMERCIAL

## 2021-07-01 VITALS — HEART RATE: 80 BPM | OXYGEN SATURATION: 99 %

## 2021-07-01 DIAGNOSIS — S62.346A CLOSED NONDISPLACED FRACTURE OF BASE OF FIFTH METACARPAL BONE OF RIGHT HAND, INITIAL ENCOUNTER: ICD-10-CM

## 2021-07-01 DIAGNOSIS — S62.346A CLOSED NONDISPLACED FRACTURE OF BASE OF FIFTH METACARPAL BONE OF RIGHT HAND, INITIAL ENCOUNTER: Primary | ICD-10-CM

## 2021-07-01 PROCEDURE — 99213 OFFICE O/P EST LOW 20 MIN: CPT | Performed by: ORTHOPAEDIC SURGERY

## 2021-07-01 PROCEDURE — 20551 NJX 1 TENDON ORIGIN/INSJ: CPT | Performed by: ORTHOPAEDIC SURGERY

## 2021-07-01 PROCEDURE — 73130 X-RAY EXAM OF HAND: CPT | Performed by: ORTHOPAEDIC SURGERY

## 2021-07-01 RX ORDER — TRIAMCINOLONE ACETONIDE 40 MG/ML
40 INJECTION, SUSPENSION INTRA-ARTICULAR; INTRAMUSCULAR ONCE
Status: COMPLETED | OUTPATIENT
Start: 2021-07-01 | End: 2021-07-01

## 2021-07-01 RX ADMIN — TRIAMCINOLONE ACETONIDE 40 MG: 40 INJECTION, SUSPENSION INTRA-ARTICULAR; INTRAMUSCULAR at 13:03:00

## 2021-07-01 NOTE — TELEPHONE ENCOUNTER
Patient would like to change Physical Therapy Script to: [facility] [place]  [phone]    Patient likes to get a return call or possible [employer] message when done. Thanks.     Patient can be reached at [phone]

## 2021-07-14 NOTE — PROGRESS NOTES
[facility] New Patient Note    CC: Status post open reduction and percutaneous pinning of right fifth metacarpal base fracture    DOI: 10/12/2020  Date of surgery: 11/13/2020    HPI: This 27year old right-hand-dominant male with metacarpal fracture Albuterol Sulfate  (90 Base) MCG/ACT Inhalation Aero Soln Inhale 1 puff into the lungs every 6 (six) hours as needed for Wheezing. • lamoTRIgine 25 MG Oral Tab Take 50 mg by mouth 2 (two) times daily.      • loratadine 10 MG Oral Tab Take 10 mg Sexual activity: Not on file       ROS:  Review of Systems   Constitutional: Negative for chills, fatigue and fever. HENT: Negative for congestion, dental problem and trouble swallowing. Eyes: Negative for photophobia, pain and visual disturbance.    R able to extend his small finger to 0 degrees. He is unable to hyperextend. He has normal fifth CMC motion.  TTP at base of 5th at ECU insertion and pain with terminal ulnar deviation     Imaging: 3 views of the right hand reveal a healed fifth metacarpal

## 2021-08-01 NOTE — PROCEDURES
Written consent was obtained. Skin was prepped with ChloraPrep.  2 mL mixture of 1 mL of 40 mg of Kenalog and 1 mL of 1% lidocaine was injected into the right ECU insertion. Patient tolerated the procedure. No complications were encountered.   Band-Aid w

## 2021-08-12 ENCOUNTER — OFFICE VISIT (OUTPATIENT)
Dept: ORTHOPEDICS CLINIC | Facility: CLINIC | Age: 26
End: 2021-08-12
Payer: COMMERCIAL

## 2021-08-12 VITALS — HEART RATE: 85 BPM | OXYGEN SATURATION: 100 %

## 2021-08-12 DIAGNOSIS — S62.346A CLOSED NONDISPLACED FRACTURE OF BASE OF FIFTH METACARPAL BONE OF RIGHT HAND, INITIAL ENCOUNTER: Primary | ICD-10-CM

## 2021-08-12 PROCEDURE — 99212 OFFICE O/P EST SF 10 MIN: CPT | Performed by: ORTHOPAEDIC SURGERY

## 2021-08-12 NOTE — PROGRESS NOTES
[facility] New Patient Note    CC: Status post open reduction and percutaneous pinning of right fifth metacarpal base fracture    DOI: 10/12/2020  Date of surgery: 11/13/2020    HPI: This 27year old right-hand-dominant male with metacarpal fracture Solution Auto-injector Inject 0.3 mL (1 each total) as directed as needed. 1 each 0   • Olopatadine HCl (PATADAY) 0.2 % Ophthalmic Solution Place 1 drop into both eyes every morning.  3 Bottle 2   • Albuterol Sulfate  (90 Base) MCG/ACT Inhalation Aer Activity      Alcohol use: Not Currently        Alcohol/week: 0.0 standard drinks      Drug use: Yes        Types: Cannabis        Comment: medically perscribed marijuana for depression treatment      Sexual activity: Not on file       ROS:  Review of Syst Judgment and thought content normal.     Right upper extremity: Skin intact. Sensation is intact over the digits. No ecchymosis or bruising is noted. He is able to make a full fist actively. He is able to extend his small finger to 0 degrees.   He is kae

## 2021-08-24 ENCOUNTER — OFFICE VISIT (OUTPATIENT)
Dept: INTERNAL MEDICINE CLINIC | Facility: CLINIC | Age: 26
End: 2021-08-24
Payer: COMMERCIAL

## 2021-08-24 VITALS
DIASTOLIC BLOOD PRESSURE: 68 MMHG | RESPIRATION RATE: 22 BRPM | HEART RATE: 96 BPM | BODY MASS INDEX: 32 KG/M2 | SYSTOLIC BLOOD PRESSURE: 96 MMHG | OXYGEN SATURATION: 99 % | WEIGHT: 211 LBS | TEMPERATURE: 99 F

## 2021-08-24 DIAGNOSIS — B07.0 PLANTAR WART: ICD-10-CM

## 2021-08-24 DIAGNOSIS — J01.40 ACUTE NON-RECURRENT PANSINUSITIS: Primary | ICD-10-CM

## 2021-08-24 PROCEDURE — 3078F DIAST BP <80 MM HG: CPT | Performed by: PHYSICIAN ASSISTANT

## 2021-08-24 PROCEDURE — 99213 OFFICE O/P EST LOW 20 MIN: CPT | Performed by: PHYSICIAN ASSISTANT

## 2021-08-24 PROCEDURE — 3074F SYST BP LT 130 MM HG: CPT | Performed by: PHYSICIAN ASSISTANT

## 2021-08-24 RX ORDER — PREDNISONE 20 MG/1
40 TABLET ORAL DAILY
Qty: 14 TABLET | Refills: 0 | Status: SHIPPED | OUTPATIENT
Start: 2021-08-24 | End: 2021-08-31

## 2021-08-24 RX ORDER — DOXYCYCLINE HYCLATE 100 MG/1
100 CAPSULE ORAL 2 TIMES DAILY
Qty: 20 CAPSULE | Refills: 0 | Status: SHIPPED | OUTPATIENT
Start: 2021-08-24 | End: 2021-10-18 | Stop reason: ALTCHOICE

## 2021-08-24 NOTE — PROGRESS NOTES
HPI:   Poppy Pacheco is a 27year old male who presents for upper respiratory symptoms for  1  weeks. Patient report sinus pressure, bl ear pain worse on left, congestion, fatigue. Patient denies sob, cough, sore throat.   Treatments tried: sinus rin • escitalopram 10 MG Oral Tab Take 10 mg by mouth once daily.   2      Past Medical History:   Diagnosis Date   • Allergic rhinitis    • Anxiety    • Asthma    • Bipolar affective ([facility] [address]    • Esophageal reflux    • Extrinsic asthma, unspecified    • Histor discharge from ears  LUNGS: denies shortness of breath, wheezing, hemoptysis  CARDIOVASCULAR: denies chest pain, palpitations or edema  GI: no nausea, vomiting or diarrhea  NEURO: denies dizziness, weakness or syncope    EXAM:   BP 96/68   Pulse 96   Temp

## 2021-08-24 NOTE — PATIENT INSTRUCTIONS
Take antibiotic and prednisone as directed until completely finished. Contact us if you experience any adverse reaction to the medication. Schedule with podiatrist  Return for routine well check when possible.  We will plan to check labs before your visit

## 2021-10-08 ENCOUNTER — MED REC SCAN ONLY (OUTPATIENT)
Dept: INTERNAL MEDICINE CLINIC | Facility: CLINIC | Age: 26
End: 2021-10-08

## 2021-10-15 ENCOUNTER — NURSE ONLY (OUTPATIENT)
Dept: LAB | Age: 26
End: 2021-10-15
Attending: PHYSICIAN ASSISTANT
Payer: COMMERCIAL

## 2021-10-15 DIAGNOSIS — R05.9 COUGH: ICD-10-CM

## 2021-10-15 DIAGNOSIS — J02.9 SORE THROAT: ICD-10-CM

## 2021-10-15 DIAGNOSIS — R50.9 FEVER, UNSPECIFIED FEVER CAUSE: ICD-10-CM

## 2021-10-18 ENCOUNTER — OFFICE VISIT (OUTPATIENT)
Dept: INTERNAL MEDICINE CLINIC | Facility: CLINIC | Age: 26
End: 2021-10-18
Payer: COMMERCIAL

## 2021-10-18 VITALS
RESPIRATION RATE: 16 BRPM | TEMPERATURE: 99 F | OXYGEN SATURATION: 97 % | BODY MASS INDEX: 32.58 KG/M2 | WEIGHT: 215 LBS | HEIGHT: 68 IN | DIASTOLIC BLOOD PRESSURE: 78 MMHG | HEART RATE: 95 BPM | SYSTOLIC BLOOD PRESSURE: 118 MMHG

## 2021-10-18 DIAGNOSIS — J22 ACUTE LOWER RESPIRATORY INFECTION: Primary | ICD-10-CM

## 2021-10-18 DIAGNOSIS — J45.21 MILD INTERMITTENT ASTHMA WITH ACUTE EXACERBATION: ICD-10-CM

## 2021-10-18 PROCEDURE — 3078F DIAST BP <80 MM HG: CPT | Performed by: PHYSICIAN ASSISTANT

## 2021-10-18 PROCEDURE — 3008F BODY MASS INDEX DOCD: CPT | Performed by: PHYSICIAN ASSISTANT

## 2021-10-18 PROCEDURE — 3074F SYST BP LT 130 MM HG: CPT | Performed by: PHYSICIAN ASSISTANT

## 2021-10-18 PROCEDURE — 99214 OFFICE O/P EST MOD 30 MIN: CPT | Performed by: PHYSICIAN ASSISTANT

## 2021-10-18 RX ORDER — ALBUTEROL SULFATE 2.5 MG/3ML
2.5 SOLUTION RESPIRATORY (INHALATION) EVERY 4 HOURS PRN
Qty: 540 ML | Refills: 0 | Status: SHIPPED | OUTPATIENT
Start: 2021-10-18

## 2021-10-18 RX ORDER — AZITHROMYCIN 250 MG/1
TABLET, FILM COATED ORAL
Qty: 6 TABLET | Refills: 0 | Status: SHIPPED | OUTPATIENT
Start: 2021-10-18 | End: 2021-10-23

## 2021-10-18 NOTE — PROGRESS NOTES
HPI:   Suyapa Arce is a 27year old male who presents for upper respiratory symptoms for  5  days. Patient reports chest congestion, cough, headache, temp up to 101 (last was yesterday evening). Some wheezing.  Patient denies sinus pressure, ear pa Past Medical History:   Diagnosis Date   • Allergic rhinitis    • Anxiety    • Asthma    • Bipolar affective ([facility] [address]    • Esophageal reflux    • Extrinsic asthma, unspecified    • History of depression    • PONV (postoperative nausea and vomiting) edema  GI: no nausea, vomiting or diarrhea  NEURO: denies dizziness, weakness or syncope    EXAM:   /78   Pulse 95   Temp 98.6 °F (37 °C) (Oral)   Resp 16   Ht 5' 8\" (1.727 m)   Wt 215 lb (97.5 kg)   SpO2 97%   BMI 32.69 kg/m²   GENERAL: well develo

## 2021-11-12 ENCOUNTER — OFFICE VISIT (OUTPATIENT)
Dept: ORTHOPEDICS CLINIC | Facility: CLINIC | Age: 26
End: 2021-11-12
Payer: COMMERCIAL

## 2021-11-12 VITALS — BODY MASS INDEX: 31.07 KG/M2 | HEIGHT: 68 IN | WEIGHT: 205 LBS

## 2021-11-12 DIAGNOSIS — R26.9 GAIT ABNORMALITY: Primary | ICD-10-CM

## 2021-11-12 DIAGNOSIS — B07.0 PLANTAR WART: ICD-10-CM

## 2021-11-12 DIAGNOSIS — S86.899A MEDIAL TIBIAL STRESS SYNDROME, UNSPECIFIED LATERALITY, INITIAL ENCOUNTER: ICD-10-CM

## 2021-11-12 PROCEDURE — 3008F BODY MASS INDEX DOCD: CPT | Performed by: PODIATRIST

## 2021-11-12 PROCEDURE — 99203 OFFICE O/P NEW LOW 30 MIN: CPT | Performed by: PODIATRIST

## 2021-11-12 RX ORDER — FLUOROURACIL 50 MG/G
1 CREAM TOPICAL DAILY
Qty: 1 EACH | Refills: 2 | Status: SHIPPED | OUTPATIENT
Start: 2021-11-12

## 2021-11-12 NOTE — PROGRESS NOTES
[facility] New Patient Note    CC: Patient presents with:   Foot Pain: bilateral foot pain and here for orthotics      HPI: The patient is a 27year old male who presents today with complaints of right foot pain, history of shinsplints in the pa Take 1,000 Units by mouth daily. • Ascorbic Acid (VITAMIN C) 1000 MG Oral Tab Take 1,000 mg by mouth daily.      • MONTELUKAST SODIUM 10 MG Oral Tab TAKE 1 TABLET BY MOUTH EVERY DAY AT BEDTIME 90 tablet 1   • cetirizine 10 MG Oral Tab Take 10 mg by mout tobacco: Never Used    Vaping Use      Vaping Use: Former    Substance and Sexual Activity      Alcohol use: Not Currently        Alcohol/week: 0.0 standard drinks      Drug use: Yes        Types: Cannabis        Comment: medically perscribed marijuana for partially prepared using [employer] voice recognition software.

## 2021-11-18 ENCOUNTER — IMMUNIZATION (OUTPATIENT)
Dept: LAB | Facility: HOSPITAL | Age: 26
End: 2021-11-18
Attending: EMERGENCY MEDICINE
Payer: COMMERCIAL

## 2021-11-18 DIAGNOSIS — Z23 NEED FOR VACCINATION: Primary | ICD-10-CM

## 2021-11-18 PROCEDURE — 0004A SARSCOV2 VAC 30MCG/0.3ML IM: CPT

## 2021-12-08 PROBLEM — Z87.442 HISTORY OF KIDNEY STONES: Status: ACTIVE | Noted: 2021-12-08

## 2021-12-09 ENCOUNTER — LAB ENCOUNTER (OUTPATIENT)
Dept: LAB | Age: 26
End: 2021-12-09
Attending: PHYSICIAN ASSISTANT
Payer: COMMERCIAL

## 2021-12-09 DIAGNOSIS — R10.9 FLANK PAIN: ICD-10-CM

## 2021-12-09 DIAGNOSIS — N20.0 KIDNEY STONE: ICD-10-CM

## 2021-12-09 DIAGNOSIS — R30.0 DYSURIA: ICD-10-CM

## 2021-12-09 PROCEDURE — 81003 URINALYSIS AUTO W/O SCOPE: CPT | Performed by: PHYSICIAN ASSISTANT

## 2021-12-09 PROCEDURE — 87086 URINE CULTURE/COLONY COUNT: CPT | Performed by: PHYSICIAN ASSISTANT

## 2021-12-20 ENCOUNTER — HOSPITAL ENCOUNTER (EMERGENCY)
Facility: HOSPITAL | Age: 26
Discharge: HOME | End: 2021-12-20
Attending: EMERGENCY MEDICINE
Payer: COMMERCIAL

## 2021-12-20 VITALS
HEART RATE: 78 BPM | BODY MASS INDEX: 31.83 KG/M2 | DIASTOLIC BLOOD PRESSURE: 83 MMHG | RESPIRATION RATE: 20 BRPM | TEMPERATURE: 96 F | WEIGHT: 210 LBS | OXYGEN SATURATION: 93 % | HEIGHT: 68 IN | SYSTOLIC BLOOD PRESSURE: 133 MMHG

## 2021-12-20 DIAGNOSIS — R11.2 NAUSEA AND VOMITING IN ADULT: Primary | ICD-10-CM

## 2021-12-20 PROCEDURE — S0028 INJECTION, FAMOTIDINE, 20 MG: HCPCS | Performed by: EMERGENCY MEDICINE

## 2021-12-20 PROCEDURE — 99284 EMERGENCY DEPT VISIT MOD MDM: CPT

## 2021-12-20 PROCEDURE — 83690 ASSAY OF LIPASE: CPT | Performed by: EMERGENCY MEDICINE

## 2021-12-20 PROCEDURE — 83690 ASSAY OF LIPASE: CPT

## 2021-12-20 PROCEDURE — 81001 URINALYSIS AUTO W/SCOPE: CPT | Performed by: EMERGENCY MEDICINE

## 2021-12-20 PROCEDURE — 96376 TX/PRO/DX INJ SAME DRUG ADON: CPT

## 2021-12-20 PROCEDURE — 96375 TX/PRO/DX INJ NEW DRUG ADDON: CPT

## 2021-12-20 PROCEDURE — 80053 COMPREHEN METABOLIC PANEL: CPT

## 2021-12-20 PROCEDURE — 96361 HYDRATE IV INFUSION ADD-ON: CPT

## 2021-12-20 PROCEDURE — 96374 THER/PROPH/DIAG INJ IV PUSH: CPT

## 2021-12-20 PROCEDURE — 80053 COMPREHEN METABOLIC PANEL: CPT | Performed by: EMERGENCY MEDICINE

## 2021-12-20 PROCEDURE — 85025 COMPLETE CBC W/AUTO DIFF WBC: CPT

## 2021-12-20 PROCEDURE — 85025 COMPLETE CBC W/AUTO DIFF WBC: CPT | Performed by: EMERGENCY MEDICINE

## 2021-12-20 RX ORDER — ONDANSETRON 2 MG/ML
4 INJECTION INTRAMUSCULAR; INTRAVENOUS ONCE
Status: COMPLETED | OUTPATIENT
Start: 2021-12-20 | End: 2021-12-20

## 2021-12-20 RX ORDER — ONDANSETRON 4 MG/1
4 TABLET, ORALLY DISINTEGRATING ORAL EVERY 4 HOURS PRN
Qty: 10 TABLET | Refills: 0 | Status: SHIPPED | OUTPATIENT
Start: 2021-12-20 | End: 2021-12-27

## 2021-12-20 RX ORDER — ONDANSETRON 2 MG/ML
INJECTION INTRAMUSCULAR; INTRAVENOUS
Status: COMPLETED
Start: 2021-12-20 | End: 2021-12-20

## 2021-12-20 RX ORDER — FAMOTIDINE 10 MG/ML
20 INJECTION, SOLUTION INTRAVENOUS ONCE
Status: COMPLETED | OUTPATIENT
Start: 2021-12-20 | End: 2021-12-20

## 2021-12-20 NOTE — ED PROVIDER NOTES
Patient Seen in: [facility] Emergency Department      History   Patient presents with:  Nausea/Vomiting/Diarrhea    Stated Complaint: vomiting since yesterday    Subjective:   HPI    28-year-old male who comes in today complaining of non bilious non air)       Current:/83   Pulse 78   Temp (!) 96.3 °F (35.7 °C)   Resp 20   Ht 172.7 cm (5' 8\")   Wt 95.3 kg   SpO2 93%   BMI 31.93 kg/m²         Physical Exam    General Appearance: Alert, cooperative, no distress, appropriate for age   Head: Normoc Narrative: The following orders were created for panel order CBC With Differential With Platelet.   Procedure                               Abnormality         Status                     ---------                               -----------         ------ tablet, R-0          I have given the patient instructions regarding his diagnosis, expectations, follow up, and return to the ER precautions.   I explained to the patient that emergent conditions may arise to return to the immediate care or ER for new, wor

## 2021-12-20 NOTE — ED INITIAL ASSESSMENT (HPI)
Pt states vomiting since noon yesterday after eating wendys. Pt denies diarrhea. Pt denies abdominal pain.

## 2022-01-07 ENCOUNTER — NURSE ONLY (OUTPATIENT)
Dept: LAB | Facility: HOSPITAL | Age: 27
End: 2022-01-07
Attending: INTERNAL MEDICINE
Payer: COMMERCIAL

## 2022-01-07 ENCOUNTER — PATIENT MESSAGE (OUTPATIENT)
Dept: INTERNAL MEDICINE CLINIC | Facility: CLINIC | Age: 27
End: 2022-01-07

## 2022-01-07 DIAGNOSIS — R50.9 FEVER, UNSPECIFIED FEVER CAUSE: Primary | ICD-10-CM

## 2022-01-07 DIAGNOSIS — R50.9 FEVER, UNSPECIFIED FEVER CAUSE: ICD-10-CM

## 2022-01-08 LAB — SARS-COV-2 RNA RESP QL NAA+PROBE: NOT DETECTED

## 2022-01-11 ENCOUNTER — TELEMEDICINE (OUTPATIENT)
Dept: INTERNAL MEDICINE CLINIC | Facility: CLINIC | Age: 27
End: 2022-01-11

## 2022-01-11 VITALS — TEMPERATURE: 100 F

## 2022-01-11 DIAGNOSIS — H92.02 LEFT EAR PAIN: Primary | ICD-10-CM

## 2022-01-11 PROCEDURE — 99213 OFFICE O/P EST LOW 20 MIN: CPT | Performed by: PHYSICIAN ASSISTANT

## 2022-01-11 RX ORDER — DOXYCYCLINE HYCLATE 100 MG/1
100 CAPSULE ORAL 2 TIMES DAILY
Qty: 20 CAPSULE | Refills: 0 | Status: SHIPPED | OUTPATIENT
Start: 2022-01-11 | End: 2022-01-14

## 2022-01-11 RX ORDER — OLOPATADINE HYDROCHLORIDE 7 MG/ML
SOLUTION OPHTHALMIC AS NEEDED
COMMUNITY

## 2022-01-11 NOTE — PROGRESS NOTES
Delphine Bowles is a 27year old female. HPI:   This visit is conducted using Telemedicine with live, interactive video and audio.      Patient consents to video visit today to discuss ongoing sore throat, congestion, postnasal drainage, ear pain on l Anxiety    • Asthma    • Bipolar affective ([facility] [address]    • Esophageal reflux    • Extrinsic asthma, unspecified    • History of depression    • PONV (postoperative nausea and vomiting)    • Visual impairment     glasses/contacts      Social History:  Social His

## 2022-01-18 ENCOUNTER — OFFICE VISIT (OUTPATIENT)
Dept: INTERNAL MEDICINE CLINIC | Facility: CLINIC | Age: 27
End: 2022-01-18
Payer: COMMERCIAL

## 2022-01-18 VITALS
OXYGEN SATURATION: 97 % | WEIGHT: 220.81 LBS | SYSTOLIC BLOOD PRESSURE: 126 MMHG | HEIGHT: 68 IN | HEART RATE: 95 BPM | RESPIRATION RATE: 12 BRPM | TEMPERATURE: 99 F | BODY MASS INDEX: 33.47 KG/M2 | DIASTOLIC BLOOD PRESSURE: 64 MMHG

## 2022-01-18 DIAGNOSIS — J02.9 ACUTE PHARYNGITIS, UNSPECIFIED ETIOLOGY: Primary | ICD-10-CM

## 2022-01-18 PROBLEM — S62.316G: Status: RESOLVED | Noted: 2020-10-21 | Resolved: 2022-01-18

## 2022-01-18 PROBLEM — K70.10: Status: RESOLVED | Noted: 2018-06-24 | Resolved: 2022-01-18

## 2022-01-18 PROBLEM — K70.10 HEPATITIS, ALCOHOLIC, ACUTE: Status: RESOLVED | Noted: 2018-06-24 | Resolved: 2022-01-18

## 2022-01-18 PROCEDURE — 3074F SYST BP LT 130 MM HG: CPT | Performed by: PHYSICIAN ASSISTANT

## 2022-01-18 PROCEDURE — 87081 CULTURE SCREEN ONLY: CPT | Performed by: PHYSICIAN ASSISTANT

## 2022-01-18 PROCEDURE — 99214 OFFICE O/P EST MOD 30 MIN: CPT | Performed by: PHYSICIAN ASSISTANT

## 2022-01-18 PROCEDURE — 3078F DIAST BP <80 MM HG: CPT | Performed by: PHYSICIAN ASSISTANT

## 2022-01-18 PROCEDURE — 3008F BODY MASS INDEX DOCD: CPT | Performed by: PHYSICIAN ASSISTANT

## 2022-01-18 RX ORDER — LIDOCAINE HYDROCHLORIDE 20 MG/ML
SOLUTION OROPHARYNGEAL
Qty: 100 ML | Refills: 0 | Status: SHIPPED | OUTPATIENT
Start: 2022-01-18

## 2022-01-18 NOTE — PATIENT INSTRUCTIONS
Continue antibiotic as prescribed  Use viscous lidocaine solution as needed if throat pain is severe  Swish/gargle/spit nystatin mouth rinse 4 times daily   Try to avoid any foods that could irritate the throat such as hot liquids, acidic foods and beverag

## 2022-01-18 NOTE — PROGRESS NOTES
HPI:   Sonali Collins is a 27year old male who presents for upper respiratory symptoms for  3  weeks. Patient reports persistent sore throat, specific to left side, radiating to the ear. + fatigue and postnasal drip.   Patient denies fever, chills, c by mouth 2 (two) times daily. • omeprazole 20 MG Oral Capsule Delayed Release Take 20 mg by mouth every morning before breakfast.     • escitalopram 10 MG Oral Tab Take 10 mg by mouth once daily.   2      Past Medical History:   Diagnosis Date   • Aller perscribed marijuana for depression treatment        REVIEW OF SYSTEMS:   GENERAL: feeling poorly.    SKIN: no rashes  EYES: denies blurred vision, eye irritation or discharge  HEENT: congested; no difficulty swallowing or discharge from ears  LUNGS: denies return if sx's persist or worsen.

## 2022-01-22 ENCOUNTER — LAB ENCOUNTER (OUTPATIENT)
Dept: LAB | Facility: HOSPITAL | Age: 27
End: 2022-01-22
Attending: PHYSICIAN ASSISTANT
Payer: COMMERCIAL

## 2022-01-22 DIAGNOSIS — R50.9 FEVER, UNSPECIFIED FEVER CAUSE: ICD-10-CM

## 2022-01-22 LAB
ALBUMIN SERPL-MCNC: 4.3 G/DL (ref 3.4–5)
ALBUMIN/GLOB SERPL: 1.2 {RATIO} (ref 1–2)
ALP LIVER SERPL-CCNC: 77 U/L
ALT SERPL-CCNC: 25 U/L
ANION GAP SERPL CALC-SCNC: 5 MMOL/L (ref 0–18)
AST SERPL-CCNC: 16 U/L (ref 15–37)
BASOPHILS # BLD AUTO: 0.04 X10(3) UL (ref 0–0.2)
BASOPHILS NFR BLD AUTO: 0.6 %
BILIRUB SERPL-MCNC: 0.6 MG/DL (ref 0.1–2)
BUN BLD-MCNC: 13 MG/DL (ref 7–18)
CALCIUM BLD-MCNC: 9.5 MG/DL (ref 8.5–10.1)
CHLORIDE SERPL-SCNC: 107 MMOL/L (ref 98–112)
CO2 SERPL-SCNC: 25 MMOL/L (ref 21–32)
CREAT BLD-MCNC: 0.84 MG/DL
CRP SERPL-MCNC: 0.67 MG/DL (ref ?–0.3)
EOSINOPHIL # BLD AUTO: 0.06 X10(3) UL (ref 0–0.7)
EOSINOPHIL NFR BLD AUTO: 0.9 %
ERYTHROCYTE [DISTWIDTH] IN BLOOD BY AUTOMATED COUNT: 13.2 %
ERYTHROCYTE [SEDIMENTATION RATE] IN BLOOD: 9 MM/HR
FASTING STATUS PATIENT QL REPORTED: YES
GLOBULIN PLAS-MCNC: 3.5 G/DL (ref 2.8–4.4)
GLUCOSE BLD-MCNC: 102 MG/DL (ref 70–99)
HCT VFR BLD AUTO: 46 %
HETEROPH AB SER QL: NEGATIVE
HGB BLD-MCNC: 15.3 G/DL
IMM GRANULOCYTES # BLD AUTO: 0.02 X10(3) UL (ref 0–1)
IMM GRANULOCYTES NFR BLD: 0.3 %
LYMPHOCYTES # BLD AUTO: 2.25 X10(3) UL (ref 1–4)
LYMPHOCYTES NFR BLD AUTO: 35.2 %
MCH RBC QN AUTO: 28.2 PG (ref 26–34)
MCHC RBC AUTO-ENTMCNC: 33.3 G/DL (ref 31–37)
MCV RBC AUTO: 84.7 FL
MONOCYTES # BLD AUTO: 0.5 X10(3) UL (ref 0.1–1)
MONOCYTES NFR BLD AUTO: 7.8 %
NEUTROPHILS # BLD AUTO: 3.53 X10 (3) UL (ref 1.5–7.7)
NEUTROPHILS # BLD AUTO: 3.53 X10(3) UL (ref 1.5–7.7)
NEUTROPHILS NFR BLD AUTO: 55.2 %
OSMOLALITY SERPL CALC.SUM OF ELEC: 284 MOSM/KG (ref 275–295)
PLATELET # BLD AUTO: 222 10(3)UL (ref 150–450)
POTASSIUM SERPL-SCNC: 4.1 MMOL/L (ref 3.5–5.1)
PROT SERPL-MCNC: 7.8 G/DL (ref 6.4–8.2)
RBC # BLD AUTO: 5.43 X10(6)UL
SODIUM SERPL-SCNC: 137 MMOL/L (ref 136–145)
WBC # BLD AUTO: 6.4 X10(3) UL (ref 4–11)

## 2022-01-22 PROCEDURE — 80053 COMPREHEN METABOLIC PANEL: CPT

## 2022-01-22 PROCEDURE — 86140 C-REACTIVE PROTEIN: CPT

## 2022-01-22 PROCEDURE — 86664 EPSTEIN-BARR NUCLEAR ANTIGEN: CPT

## 2022-01-22 PROCEDURE — 85025 COMPLETE CBC W/AUTO DIFF WBC: CPT

## 2022-01-22 PROCEDURE — 36415 COLL VENOUS BLD VENIPUNCTURE: CPT

## 2022-01-22 PROCEDURE — 85652 RBC SED RATE AUTOMATED: CPT

## 2022-01-22 PROCEDURE — 86403 PARTICLE AGGLUT ANTBDY SCRN: CPT

## 2022-01-22 PROCEDURE — 86665 EPSTEIN-BARR CAPSID VCA: CPT

## 2022-01-24 LAB
EBV NA IGG SER QL IA: POSITIVE
EBV VCA IGG SER QL IA: POSITIVE
EBV VCA IGM SER QL IA: NEGATIVE

## 2022-06-24 ENCOUNTER — OFFICE VISIT (OUTPATIENT)
Dept: INTERNAL MEDICINE CLINIC | Facility: CLINIC | Age: 27
End: 2022-06-24
Payer: COMMERCIAL

## 2022-06-24 ENCOUNTER — LAB ENCOUNTER (OUTPATIENT)
Dept: LAB | Age: 27
End: 2022-06-24
Attending: PHYSICIAN ASSISTANT
Payer: COMMERCIAL

## 2022-06-24 VITALS
SYSTOLIC BLOOD PRESSURE: 110 MMHG | TEMPERATURE: 99 F | BODY MASS INDEX: 32.89 KG/M2 | RESPIRATION RATE: 16 BRPM | HEART RATE: 87 BPM | WEIGHT: 217 LBS | DIASTOLIC BLOOD PRESSURE: 82 MMHG | OXYGEN SATURATION: 98 % | HEIGHT: 68 IN

## 2022-06-24 DIAGNOSIS — Z00.00 LABORATORY EXAM ORDERED AS PART OF ROUTINE GENERAL MEDICAL EXAMINATION: ICD-10-CM

## 2022-06-24 DIAGNOSIS — Z00.00 ROUTINE PHYSICAL EXAMINATION: Primary | ICD-10-CM

## 2022-06-24 DIAGNOSIS — K21.9 GASTROESOPHAGEAL REFLUX DISEASE WITHOUT ESOPHAGITIS: ICD-10-CM

## 2022-06-24 DIAGNOSIS — J45.20 MILD INTERMITTENT ASTHMA WITHOUT COMPLICATION: ICD-10-CM

## 2022-06-24 DIAGNOSIS — F31.9 BIPOLAR 1 DISORDER (HCC): ICD-10-CM

## 2022-06-24 LAB
ALBUMIN SERPL-MCNC: 4.2 G/DL (ref 3.4–5)
ALBUMIN/GLOB SERPL: 1.2 {RATIO} (ref 1–2)
ALP LIVER SERPL-CCNC: 81 U/L
ALT SERPL-CCNC: 20 U/L
ANION GAP SERPL CALC-SCNC: 7 MMOL/L (ref 0–18)
AST SERPL-CCNC: 16 U/L (ref 15–37)
BASOPHILS # BLD AUTO: 0.04 X10(3) UL (ref 0–0.2)
BASOPHILS NFR BLD AUTO: 0.6 %
BILIRUB SERPL-MCNC: 0.4 MG/DL (ref 0.1–2)
BILIRUB UR QL STRIP.AUTO: NEGATIVE
BUN BLD-MCNC: 15 MG/DL (ref 7–18)
CALCIUM BLD-MCNC: 9.9 MG/DL (ref 8.5–10.1)
CHLORIDE SERPL-SCNC: 107 MMOL/L (ref 98–112)
CHOLEST SERPL-MCNC: 171 MG/DL (ref ?–200)
CLARITY UR REFRACT.AUTO: CLEAR
CO2 SERPL-SCNC: 24 MMOL/L (ref 21–32)
COLOR UR AUTO: YELLOW
CREAT BLD-MCNC: 0.98 MG/DL
EOSINOPHIL # BLD AUTO: 0.12 X10(3) UL (ref 0–0.7)
EOSINOPHIL NFR BLD AUTO: 1.7 %
ERYTHROCYTE [DISTWIDTH] IN BLOOD BY AUTOMATED COUNT: 13.6 %
EST. AVERAGE GLUCOSE BLD GHB EST-MCNC: 114 MG/DL (ref 68–126)
FASTING PATIENT LIPID ANSWER: YES
FASTING STATUS PATIENT QL REPORTED: YES
GLOBULIN PLAS-MCNC: 3.6 G/DL (ref 2.8–4.4)
GLUCOSE BLD-MCNC: 106 MG/DL (ref 70–99)
GLUCOSE UR STRIP.AUTO-MCNC: NEGATIVE MG/DL
HBA1C MFR BLD: 5.6 % (ref ?–5.7)
HCT VFR BLD AUTO: 45.6 %
HDLC SERPL-MCNC: 38 MG/DL (ref 40–59)
HGB BLD-MCNC: 15.5 G/DL
HYALINE CASTS #/AREA URNS AUTO: PRESENT /LPF
IMM GRANULOCYTES # BLD AUTO: 0.03 X10(3) UL (ref 0–1)
IMM GRANULOCYTES NFR BLD: 0.4 %
KETONES UR STRIP.AUTO-MCNC: NEGATIVE MG/DL
LDLC SERPL CALC-MCNC: 110 MG/DL (ref ?–100)
LEUKOCYTE ESTERASE UR QL STRIP.AUTO: NEGATIVE
LYMPHOCYTES # BLD AUTO: 2.25 X10(3) UL (ref 1–4)
LYMPHOCYTES NFR BLD AUTO: 32.6 %
MCH RBC QN AUTO: 29.4 PG (ref 26–34)
MCHC RBC AUTO-ENTMCNC: 34 G/DL (ref 31–37)
MCV RBC AUTO: 86.4 FL
MONOCYTES # BLD AUTO: 0.56 X10(3) UL (ref 0.1–1)
MONOCYTES NFR BLD AUTO: 8.1 %
NEUTROPHILS # BLD AUTO: 3.91 X10 (3) UL (ref 1.5–7.7)
NEUTROPHILS # BLD AUTO: 3.91 X10(3) UL (ref 1.5–7.7)
NEUTROPHILS NFR BLD AUTO: 56.6 %
NITRITE UR QL STRIP.AUTO: NEGATIVE
NONHDLC SERPL-MCNC: 133 MG/DL (ref ?–130)
OSMOLALITY SERPL CALC.SUM OF ELEC: 287 MOSM/KG (ref 275–295)
PH UR STRIP.AUTO: 6 [PH] (ref 5–8)
PLATELET # BLD AUTO: 187 10(3)UL (ref 150–450)
POTASSIUM SERPL-SCNC: 4.4 MMOL/L (ref 3.5–5.1)
PROT SERPL-MCNC: 7.8 G/DL (ref 6.4–8.2)
PROT UR STRIP.AUTO-MCNC: 30 MG/DL
RBC # BLD AUTO: 5.28 X10(6)UL
RBC UR QL AUTO: NEGATIVE
SODIUM SERPL-SCNC: 138 MMOL/L (ref 136–145)
SP GR UR STRIP.AUTO: 1.02 (ref 1–1.03)
TRIGL SERPL-MCNC: 130 MG/DL (ref 30–149)
TSI SER-ACNC: 1.7 MIU/ML (ref 0.36–3.74)
UROBILINOGEN UR STRIP.AUTO-MCNC: <2 MG/DL
VLDLC SERPL CALC-MCNC: 22 MG/DL (ref 0–30)
WBC # BLD AUTO: 6.9 X10(3) UL (ref 4–11)

## 2022-06-24 PROCEDURE — 80050 GENERAL HEALTH PANEL: CPT | Performed by: PHYSICIAN ASSISTANT

## 2022-06-24 PROCEDURE — 81001 URINALYSIS AUTO W/SCOPE: CPT | Performed by: PHYSICIAN ASSISTANT

## 2022-06-24 PROCEDURE — 83036 HEMOGLOBIN GLYCOSYLATED A1C: CPT | Performed by: PHYSICIAN ASSISTANT

## 2022-06-24 PROCEDURE — 80061 LIPID PANEL: CPT | Performed by: PHYSICIAN ASSISTANT

## 2022-06-24 RX ORDER — FLUTICASONE FUROATE 27.5 UG/1
2 SPRAY, METERED NASAL DAILY
Qty: 3 EACH | Refills: 3 | Status: SHIPPED | OUTPATIENT
Start: 2022-06-24

## 2022-09-06 ENCOUNTER — OFFICE VISIT (OUTPATIENT)
Dept: INTERNAL MEDICINE CLINIC | Facility: CLINIC | Age: 27
End: 2022-09-06
Payer: COMMERCIAL

## 2022-09-06 VITALS
DIASTOLIC BLOOD PRESSURE: 76 MMHG | BODY MASS INDEX: 32.74 KG/M2 | WEIGHT: 216 LBS | OXYGEN SATURATION: 97 % | HEIGHT: 68 IN | HEART RATE: 106 BPM | TEMPERATURE: 99 F | SYSTOLIC BLOOD PRESSURE: 116 MMHG | RESPIRATION RATE: 16 BRPM

## 2022-09-06 DIAGNOSIS — J01.00 ACUTE NON-RECURRENT MAXILLARY SINUSITIS: Primary | ICD-10-CM

## 2022-09-06 PROCEDURE — 3008F BODY MASS INDEX DOCD: CPT | Performed by: INTERNAL MEDICINE

## 2022-09-06 PROCEDURE — 3074F SYST BP LT 130 MM HG: CPT | Performed by: INTERNAL MEDICINE

## 2022-09-06 PROCEDURE — 3078F DIAST BP <80 MM HG: CPT | Performed by: INTERNAL MEDICINE

## 2022-09-06 PROCEDURE — 99214 OFFICE O/P EST MOD 30 MIN: CPT | Performed by: INTERNAL MEDICINE

## 2022-09-06 RX ORDER — DOXYCYCLINE HYCLATE 100 MG/1
100 CAPSULE ORAL 2 TIMES DAILY
Qty: 14 CAPSULE | Refills: 0 | Status: SHIPPED | OUTPATIENT
Start: 2022-09-06

## 2022-09-06 RX ORDER — PREDNISONE 20 MG/1
TABLET ORAL
Qty: 14 TABLET | Refills: 0 | Status: SHIPPED | OUTPATIENT
Start: 2022-09-06

## 2022-11-01 ENCOUNTER — TELEMEDICINE (OUTPATIENT)
Dept: INTERNAL MEDICINE CLINIC | Facility: CLINIC | Age: 27
End: 2022-11-01

## 2022-11-01 DIAGNOSIS — U07.1 COVID-19: Primary | ICD-10-CM

## 2022-11-01 DIAGNOSIS — J45.21 MILD INTERMITTENT ASTHMA WITH ACUTE EXACERBATION: ICD-10-CM

## 2022-11-01 PROCEDURE — 99213 OFFICE O/P EST LOW 20 MIN: CPT | Performed by: PHYSICIAN ASSISTANT

## 2022-11-01 RX ORDER — ALBUTEROL SULFATE 2.5 MG/3ML
2.5 SOLUTION RESPIRATORY (INHALATION) EVERY 4 HOURS PRN
Qty: 540 ML | Refills: 0 | Status: SHIPPED | OUTPATIENT
Start: 2022-11-01

## 2023-01-09 ENCOUNTER — OFFICE VISIT (OUTPATIENT)
Dept: INTERNAL MEDICINE CLINIC | Facility: CLINIC | Age: 28
End: 2023-01-09
Payer: COMMERCIAL

## 2023-01-09 VITALS
HEART RATE: 72 BPM | SYSTOLIC BLOOD PRESSURE: 120 MMHG | WEIGHT: 216 LBS | HEIGHT: 68 IN | TEMPERATURE: 98 F | DIASTOLIC BLOOD PRESSURE: 74 MMHG | OXYGEN SATURATION: 99 % | BODY MASS INDEX: 32.74 KG/M2 | RESPIRATION RATE: 16 BRPM

## 2023-01-09 DIAGNOSIS — R59.0 SUPERFICIAL INGUINAL LYMPHADENOPATHY: Primary | ICD-10-CM

## 2023-01-09 DIAGNOSIS — N30.00 ACUTE CYSTITIS WITHOUT HEMATURIA: ICD-10-CM

## 2023-01-09 LAB
BILIRUBIN: NEGATIVE
GLUCOSE (URINE DIPSTICK): NEGATIVE MG/DL
KETONES (URINE DIPSTICK): NEGATIVE MG/DL
MULTISTIX LOT#: ABNORMAL NUMERIC
NITRITE, URINE: NEGATIVE
PH, URINE: 7.5 (ref 4.5–8)
PROTEIN (URINE DIPSTICK): NEGATIVE MG/DL
SPECIFIC GRAVITY: 1.02 (ref 1–1.03)
URINE-COLOR: YELLOW
UROBILINOGEN,SEMI-QN: 0.2 MG/DL (ref 0–1.9)

## 2023-01-09 PROCEDURE — 87086 URINE CULTURE/COLONY COUNT: CPT | Performed by: PHYSICIAN ASSISTANT

## 2023-01-09 RX ORDER — SULFAMETHOXAZOLE AND TRIMETHOPRIM 800; 160 MG/1; MG/1
1 TABLET ORAL 2 TIMES DAILY
Qty: 14 TABLET | Refills: 0 | Status: SHIPPED | OUTPATIENT
Start: 2023-01-09

## 2023-01-11 ENCOUNTER — PATIENT MESSAGE (OUTPATIENT)
Dept: INTERNAL MEDICINE CLINIC | Facility: CLINIC | Age: 28
End: 2023-01-11

## 2023-06-21 ENCOUNTER — OFFICE VISIT (OUTPATIENT)
Dept: INTERNAL MEDICINE CLINIC | Facility: CLINIC | Age: 28
End: 2023-06-21
Payer: COMMERCIAL

## 2023-06-21 VITALS
SYSTOLIC BLOOD PRESSURE: 146 MMHG | HEART RATE: 80 BPM | BODY MASS INDEX: 32.43 KG/M2 | TEMPERATURE: 99 F | OXYGEN SATURATION: 97 % | HEIGHT: 68 IN | WEIGHT: 214 LBS | DIASTOLIC BLOOD PRESSURE: 86 MMHG | RESPIRATION RATE: 16 BRPM

## 2023-06-21 DIAGNOSIS — R03.0 ELEVATED BLOOD PRESSURE READING: ICD-10-CM

## 2023-06-21 DIAGNOSIS — J45.21 MILD INTERMITTENT ASTHMA WITH ACUTE EXACERBATION: ICD-10-CM

## 2023-06-21 DIAGNOSIS — J01.00 ACUTE NON-RECURRENT MAXILLARY SINUSITIS: Primary | ICD-10-CM

## 2023-06-21 PROCEDURE — 3079F DIAST BP 80-89 MM HG: CPT | Performed by: PHYSICIAN ASSISTANT

## 2023-06-21 PROCEDURE — 99213 OFFICE O/P EST LOW 20 MIN: CPT | Performed by: PHYSICIAN ASSISTANT

## 2023-06-21 PROCEDURE — 3008F BODY MASS INDEX DOCD: CPT | Performed by: PHYSICIAN ASSISTANT

## 2023-06-21 PROCEDURE — 3077F SYST BP >= 140 MM HG: CPT | Performed by: PHYSICIAN ASSISTANT

## 2023-06-21 RX ORDER — DOXYCYCLINE HYCLATE 100 MG/1
100 CAPSULE ORAL 2 TIMES DAILY
Qty: 20 CAPSULE | Refills: 0 | Status: SHIPPED | OUTPATIENT
Start: 2023-06-21

## 2023-06-21 RX ORDER — PREDNISONE 20 MG/1
40 TABLET ORAL DAILY
Qty: 14 TABLET | Refills: 0 | Status: SHIPPED | OUTPATIENT
Start: 2023-06-21 | End: 2023-06-28

## 2023-06-21 NOTE — PATIENT INSTRUCTIONS
Take antibiotic as directed until completely finished. Contact us if you experience any adverse reaction to the medication. Take prednisone as directed; if you have significant side effects, decrease to 1 tablet daily instead of 2.   Check blood pressure occasionally at home

## 2023-08-24 ENCOUNTER — OFFICE VISIT (OUTPATIENT)
Dept: ORTHOPEDICS CLINIC | Facility: CLINIC | Age: 28
End: 2023-08-24
Payer: COMMERCIAL

## 2023-08-24 VITALS — WEIGHT: 214 LBS | HEIGHT: 68 IN | BODY MASS INDEX: 32.43 KG/M2

## 2023-08-24 DIAGNOSIS — S90.829A BLISTER OF FOOT, UNSPECIFIED LATERALITY, INITIAL ENCOUNTER: ICD-10-CM

## 2023-08-24 DIAGNOSIS — R26.9 GAIT ABNORMALITY: Primary | ICD-10-CM

## 2023-08-24 DIAGNOSIS — S86.899D MEDIAL TIBIAL STRESS SYNDROME, UNSPECIFIED LATERALITY, SUBSEQUENT ENCOUNTER: ICD-10-CM

## 2023-08-24 PROBLEM — S86.899A SHIN SPLINTS: Status: ACTIVE | Noted: 2023-08-24

## 2023-08-24 PROCEDURE — 99213 OFFICE O/P EST LOW 20 MIN: CPT | Performed by: PODIATRIST

## 2023-08-24 PROCEDURE — 3008F BODY MASS INDEX DOCD: CPT | Performed by: PODIATRIST

## 2023-08-24 NOTE — PROGRESS NOTES
[facility] Progress Note    Subjective: Leighton Tam is here for follow-up of his feet and to proceed with new orthotics. Current pair are almost 3years old  Right heel broken down more in device  Hx of lateral ankle sprains            Objective:     Exam overpronation and gait, walking in walking gait. No shin splint pain today but history of shinsplints. Healing blister bilateral feet          Imaging: No new images needed        Assessment/Plan:     Diagnoses and all orders for this visit:    Gait abnormality  -     Cancel: DME - EXTERNAL   -     DME - EXTERNAL     Medial tibial stress syndrome, unspecified laterality, subsequent encounter  -     Cancel: DME - EXTERNAL   -     DME - EXTERNAL     Blister of foot, unspecified laterality, initial encounter        Set him up to go ahead and get orthotics through     Follow-up as needed        Scar De Paz DPM  [facility]    TR[employer]and[employer] speech recognition software was used to prepare this note. If a word or phrase is confusing, it is likely do to a failure of recognition. Please contact me with any questions or clarifications.

## 2023-09-24 ENCOUNTER — HOSPITAL ENCOUNTER (INPATIENT)
Facility: HOSPITAL | Age: 28
LOS: 2 days | Discharge: HOME | End: 2023-09-28
Attending: EMERGENCY MEDICINE | Admitting: HOSPITALIST
Payer: COMMERCIAL

## 2023-09-24 ENCOUNTER — APPOINTMENT (OUTPATIENT)
Dept: CT IMAGING | Facility: HOSPITAL | Age: 28
End: 2023-09-24
Attending: EMERGENCY MEDICINE
Payer: COMMERCIAL

## 2023-09-24 DIAGNOSIS — K57.92 ACUTE DIVERTICULITIS: Primary | ICD-10-CM

## 2023-09-24 LAB
ALBUMIN SERPL-MCNC: 4.4 G/DL (ref 3.4–5)
ALBUMIN/GLOB SERPL: 1 {RATIO} (ref 1–2)
ALP LIVER SERPL-CCNC: 96 U/L
ALT SERPL-CCNC: 25 U/L
ANION GAP SERPL CALC-SCNC: 9 MMOL/L (ref 0–18)
AST SERPL-CCNC: 9 U/L (ref 15–37)
BASOPHILS # BLD AUTO: 0.05 X10(3) UL (ref 0–0.2)
BASOPHILS NFR BLD AUTO: 0.3 %
BILIRUB SERPL-MCNC: 0.9 MG/DL (ref 0.1–2)
BUN BLD-MCNC: 14 MG/DL (ref 7–18)
CALCIUM BLD-MCNC: 10 MG/DL (ref 8.5–10.1)
CHLORIDE SERPL-SCNC: 106 MMOL/L (ref 98–112)
CO2 SERPL-SCNC: 22 MMOL/L (ref 21–32)
CREAT BLD-MCNC: 0.97 MG/DL
EGFRCR SERPLBLD CKD-EPI 2021: 110 ML/MIN/1.73M2 (ref 60–?)
EOSINOPHIL # BLD AUTO: 0.02 X10(3) UL (ref 0–0.7)
EOSINOPHIL NFR BLD AUTO: 0.1 %
ERYTHROCYTE [DISTWIDTH] IN BLOOD BY AUTOMATED COUNT: 12.8 %
GLOBULIN PLAS-MCNC: 4.2 G/DL (ref 2.8–4.4)
GLUCOSE BLD-MCNC: 105 MG/DL (ref 70–99)
HCT VFR BLD AUTO: 43.6 %
HGB BLD-MCNC: 15.7 G/DL
IMM GRANULOCYTES # BLD AUTO: 0.08 X10(3) UL (ref 0–1)
IMM GRANULOCYTES NFR BLD: 0.5 %
LYMPHOCYTES # BLD AUTO: 2.56 X10(3) UL (ref 1–4)
LYMPHOCYTES NFR BLD AUTO: 15.8 %
MCH RBC QN AUTO: 29.6 PG (ref 26–34)
MCHC RBC AUTO-ENTMCNC: 36 G/DL (ref 31–37)
MCV RBC AUTO: 82.1 FL
MONOCYTES # BLD AUTO: 1.3 X10(3) UL (ref 0.1–1)
MONOCYTES NFR BLD AUTO: 8 %
NEUTROPHILS # BLD AUTO: 12.21 X10 (3) UL (ref 1.5–7.7)
NEUTROPHILS # BLD AUTO: 12.21 X10(3) UL (ref 1.5–7.7)
NEUTROPHILS NFR BLD AUTO: 75.3 %
OSMOLALITY SERPL CALC.SUM OF ELEC: 285 MOSM/KG (ref 275–295)
PLATELET # BLD AUTO: 229 10(3)UL (ref 150–450)
POTASSIUM SERPL-SCNC: 3.6 MMOL/L (ref 3.5–5.1)
PROT SERPL-MCNC: 8.6 G/DL (ref 6.4–8.2)
RBC # BLD AUTO: 5.31 X10(6)UL
SODIUM SERPL-SCNC: 137 MMOL/L (ref 136–145)
WBC # BLD AUTO: 16.2 X10(3) UL (ref 4–11)

## 2023-09-24 PROCEDURE — 99222 1ST HOSP IP/OBS MODERATE 55: CPT | Performed by: HOSPITALIST

## 2023-09-24 PROCEDURE — 74177 CT ABD & PELVIS W/CONTRAST: CPT | Performed by: EMERGENCY MEDICINE

## 2023-09-24 RX ORDER — SENNOSIDES 8.6 MG
17.2 TABLET ORAL NIGHTLY PRN
Status: DISCONTINUED | OUTPATIENT
Start: 2023-09-24 | End: 2023-09-28

## 2023-09-24 RX ORDER — ONDANSETRON 2 MG/ML
4 INJECTION INTRAMUSCULAR; INTRAVENOUS EVERY 6 HOURS PRN
Status: DISCONTINUED | OUTPATIENT
Start: 2023-09-24 | End: 2023-09-28

## 2023-09-24 RX ORDER — LAMOTRIGINE 25 MG/1
50 TABLET ORAL 2 TIMES DAILY
Status: DISCONTINUED | OUTPATIENT
Start: 2023-09-24 | End: 2023-09-28

## 2023-09-24 RX ORDER — BISACODYL 10 MG
10 SUPPOSITORY, RECTAL RECTAL
Status: DISCONTINUED | OUTPATIENT
Start: 2023-09-24 | End: 2023-09-28

## 2023-09-24 RX ORDER — ENEMA 19; 7 G/133ML; G/133ML
1 ENEMA RECTAL ONCE AS NEEDED
Status: DISCONTINUED | OUTPATIENT
Start: 2023-09-24 | End: 2023-09-28

## 2023-09-24 RX ORDER — ACETAMINOPHEN 500 MG
500 TABLET ORAL EVERY 4 HOURS PRN
Status: DISCONTINUED | OUTPATIENT
Start: 2023-09-24 | End: 2023-09-28

## 2023-09-24 RX ORDER — ONDANSETRON 2 MG/ML
4 INJECTION INTRAMUSCULAR; INTRAVENOUS ONCE
Status: COMPLETED | OUTPATIENT
Start: 2023-09-24 | End: 2023-09-24

## 2023-09-24 RX ORDER — HYDROMORPHONE HYDROCHLORIDE 1 MG/ML
0.8 INJECTION, SOLUTION INTRAMUSCULAR; INTRAVENOUS; SUBCUTANEOUS EVERY 2 HOUR PRN
Status: DISCONTINUED | OUTPATIENT
Start: 2023-09-24 | End: 2023-09-28

## 2023-09-24 RX ORDER — POLYETHYLENE GLYCOL 3350 17 G/17G
17 POWDER, FOR SOLUTION ORAL DAILY PRN
Status: DISCONTINUED | OUTPATIENT
Start: 2023-09-24 | End: 2023-09-28

## 2023-09-24 RX ORDER — MELATONIN
3 NIGHTLY PRN
Status: DISCONTINUED | OUTPATIENT
Start: 2023-09-24 | End: 2023-09-28

## 2023-09-24 RX ORDER — HYDROMORPHONE HYDROCHLORIDE 1 MG/ML
0.2 INJECTION, SOLUTION INTRAMUSCULAR; INTRAVENOUS; SUBCUTANEOUS EVERY 2 HOUR PRN
Status: DISCONTINUED | OUTPATIENT
Start: 2023-09-24 | End: 2023-09-28

## 2023-09-24 RX ORDER — ESCITALOPRAM OXALATE 10 MG/1
10 TABLET ORAL
Status: DISCONTINUED | OUTPATIENT
Start: 2023-09-25 | End: 2023-09-28

## 2023-09-24 RX ORDER — PROCHLORPERAZINE EDISYLATE 5 MG/ML
5 INJECTION INTRAMUSCULAR; INTRAVENOUS EVERY 8 HOURS PRN
Status: DISCONTINUED | OUTPATIENT
Start: 2023-09-24 | End: 2023-09-28

## 2023-09-24 RX ORDER — KETOROLAC TROMETHAMINE 15 MG/ML
15 INJECTION, SOLUTION INTRAMUSCULAR; INTRAVENOUS ONCE
Status: COMPLETED | OUTPATIENT
Start: 2023-09-24 | End: 2023-09-24

## 2023-09-24 RX ORDER — HYDROMORPHONE HYDROCHLORIDE 1 MG/ML
0.4 INJECTION, SOLUTION INTRAMUSCULAR; INTRAVENOUS; SUBCUTANEOUS EVERY 2 HOUR PRN
Status: DISCONTINUED | OUTPATIENT
Start: 2023-09-24 | End: 2023-09-28

## 2023-09-24 RX ORDER — METRONIDAZOLE 500 MG/100ML
500 INJECTION, SOLUTION INTRAVENOUS EVERY 8 HOURS
Status: DISCONTINUED | OUTPATIENT
Start: 2023-09-25 | End: 2023-09-28

## 2023-09-24 RX ORDER — METRONIDAZOLE 500 MG/100ML
500 INJECTION, SOLUTION INTRAVENOUS ONCE
Status: COMPLETED | OUTPATIENT
Start: 2023-09-24 | End: 2023-09-26

## 2023-09-24 RX ORDER — HYDROMORPHONE HYDROCHLORIDE 1 MG/ML
0.5 INJECTION, SOLUTION INTRAMUSCULAR; INTRAVENOUS; SUBCUTANEOUS ONCE
Status: COMPLETED | OUTPATIENT
Start: 2023-09-24 | End: 2023-09-24

## 2023-09-24 RX ORDER — SODIUM CHLORIDE 9 MG/ML
INJECTION, SOLUTION INTRAVENOUS CONTINUOUS
Status: DISCONTINUED | OUTPATIENT
Start: 2023-09-24 | End: 2023-09-28

## 2023-09-24 NOTE — ED INITIAL ASSESSMENT (HPI)
Pt c/o severe abdominal pain and vomiting since yesterday. Pt also had blood nose from vomiting. Pt denies urinary symptoms.

## 2023-09-25 LAB
BILIRUB UR QL STRIP.AUTO: NEGATIVE
CLARITY UR REFRACT.AUTO: CLEAR
COLOR UR AUTO: YELLOW
GLUCOSE UR STRIP.AUTO-MCNC: NORMAL MG/DL
KETONES UR STRIP.AUTO-MCNC: NEGATIVE MG/DL
LEUKOCYTE ESTERASE UR QL STRIP.AUTO: NEGATIVE
NITRITE UR QL STRIP.AUTO: NEGATIVE
PH UR STRIP.AUTO: 6 [PH] (ref 5–8)
POTASSIUM SERPL-SCNC: 4.2 MMOL/L (ref 3.5–5.1)
PROT UR STRIP.AUTO-MCNC: 20 MG/DL
RBC UR QL AUTO: NEGATIVE
SP GR UR STRIP.AUTO: >1.03 (ref 1–1.03)
UROBILINOGEN UR STRIP.AUTO-MCNC: NORMAL MG/DL

## 2023-09-25 PROCEDURE — 99232 SBSQ HOSP IP/OBS MODERATE 35: CPT | Performed by: INTERNAL MEDICINE

## 2023-09-25 RX ORDER — POTASSIUM CHLORIDE 20 MEQ/1
40 TABLET, EXTENDED RELEASE ORAL EVERY 4 HOURS
Status: COMPLETED | OUTPATIENT
Start: 2023-09-25 | End: 2023-09-25

## 2023-09-25 RX ORDER — HYDROCODONE BITARTRATE AND ACETAMINOPHEN 5; 325 MG/1; MG/1
1 TABLET ORAL EVERY 6 HOURS PRN
Status: DISCONTINUED | OUTPATIENT
Start: 2023-09-25 | End: 2023-09-28

## 2023-09-25 NOTE — PROGRESS NOTES
Assumed care of pt around 0730. Pt Aox4. VSS on RA-2L with sleep. No tele. IVF. IV ABX. Prn meds for pain and nausea. NPO, sips with meds. Pt up ad jah. Updated pt and pts father on poc, all questions answered.

## 2023-09-25 NOTE — PLAN OF CARE
NURSING ADMISSION NOTE      Patient admitted via Cart  Oriented to room. Safety precautions initiated. Bed in low position. Call light in reach. Alert and oriented x4. VSS. On RA. . Pain controlled with PRN medication. Pt reporting nausea, antiemetics given. Voiding freely. Up ad jah. IVF and IV abx as ordered. NPO.

## 2023-09-25 NOTE — ED QUICK NOTES
Orders for admission, patient is aware of plan and ready to go upstairs. Any questions, please call ED RN papo at extension [phone].      Patient Covid vaccination status: Fully vaccinated     COVID Test Ordered in ED: None    COVID Suspicion at Admission: N/A    Running Infusions:      Mental Status/LOC at time of transport: aox3    Other pertinent information:   CIWA score: N/A   NIH score:  N/A

## 2023-09-25 NOTE — PROGRESS NOTES
ED Antibiotic Dose Adjustment by Pharmacy    Ceftriaxone 2 gm IVPB x 1 dose has been ordered in the ED for acute diverticulitis. Pharmacy has adjusted the dose to ceftriaxone 1 gm IVPB x1 per hospital protocol based on actual body weight < 100 kg.       Thank you,    Chandu Albert, PharmD, BCPS, [facility]  Clinical Pharmacist Specialist - Emergency Medicine  09/24/23; 8:50 PM

## 2023-09-26 LAB
ANION GAP SERPL CALC-SCNC: 7 MMOL/L (ref 0–18)
BASOPHILS # BLD AUTO: 0.03 X10(3) UL (ref 0–0.2)
BASOPHILS NFR BLD AUTO: 0.4 %
BUN BLD-MCNC: 9 MG/DL (ref 7–18)
CALCIUM BLD-MCNC: 8.4 MG/DL (ref 8.5–10.1)
CHLORIDE SERPL-SCNC: 107 MMOL/L (ref 98–112)
CO2 SERPL-SCNC: 24 MMOL/L (ref 21–32)
CREAT BLD-MCNC: 0.69 MG/DL
EGFRCR SERPLBLD CKD-EPI 2021: 131 ML/MIN/1.73M2 (ref 60–?)
EOSINOPHIL # BLD AUTO: 0.09 X10(3) UL (ref 0–0.7)
EOSINOPHIL NFR BLD AUTO: 1.1 %
ERYTHROCYTE [DISTWIDTH] IN BLOOD BY AUTOMATED COUNT: 12.7 %
GLUCOSE BLD-MCNC: 89 MG/DL (ref 70–99)
HCT VFR BLD AUTO: 36.5 %
HGB BLD-MCNC: 12.1 G/DL
IMM GRANULOCYTES # BLD AUTO: 0.03 X10(3) UL (ref 0–1)
IMM GRANULOCYTES NFR BLD: 0.4 %
LYMPHOCYTES # BLD AUTO: 2.09 X10(3) UL (ref 1–4)
LYMPHOCYTES NFR BLD AUTO: 25.1 %
MCH RBC QN AUTO: 29.7 PG (ref 26–34)
MCHC RBC AUTO-ENTMCNC: 33.2 G/DL (ref 31–37)
MCV RBC AUTO: 89.5 FL
MONOCYTES # BLD AUTO: 0.73 X10(3) UL (ref 0.1–1)
MONOCYTES NFR BLD AUTO: 8.8 %
NEUTROPHILS # BLD AUTO: 5.36 X10 (3) UL (ref 1.5–7.7)
NEUTROPHILS # BLD AUTO: 5.36 X10(3) UL (ref 1.5–7.7)
NEUTROPHILS NFR BLD AUTO: 64.2 %
OSMOLALITY SERPL CALC.SUM OF ELEC: 284 MOSM/KG (ref 275–295)
PLATELET # BLD AUTO: 142 10(3)UL (ref 150–450)
POTASSIUM SERPL-SCNC: 3.9 MMOL/L (ref 3.5–5.1)
RBC # BLD AUTO: 4.08 X10(6)UL
SODIUM SERPL-SCNC: 138 MMOL/L (ref 136–145)
WBC # BLD AUTO: 8.3 X10(3) UL (ref 4–11)

## 2023-09-26 PROCEDURE — 99232 SBSQ HOSP IP/OBS MODERATE 35: CPT | Performed by: INTERNAL MEDICINE

## 2023-09-26 NOTE — PLAN OF CARE
Pt a/ox4. Continues to have abdominal pain, worsens with straining when urinating. Pain medication given as ordered. Up as tolerated in room and halls. NPO with sips with meds, ice chips. Continuing IV abx per orders. Plan to home  pending clinical progress.

## 2023-09-27 PROCEDURE — 99232 SBSQ HOSP IP/OBS MODERATE 35: CPT | Performed by: HOSPITALIST

## 2023-09-27 NOTE — PLAN OF CARE
Pt a/ox4. Pain improved since 9/26, now rating pain 4-5/10 when assessed. Upgraded to a full liquid diet, advancing as tolerated. No BM, passing flatus. Up as tolerated in room and halls. Continuing IV abx and fluids as ordered. Plan to home once medically cleared.

## 2023-09-27 NOTE — PLAN OF CARE
Paatient A & O x4. VSS, on RA. C/o mod-severe pain controlled with PO and IV pain meds. Voiding freely. IV abx. IVF infusing per order. Tolerating clear liquids. Safety measures in place. Instructed to use call light.

## 2023-09-28 VITALS
HEIGHT: 68 IN | OXYGEN SATURATION: 91 % | RESPIRATION RATE: 18 BRPM | SYSTOLIC BLOOD PRESSURE: 125 MMHG | BODY MASS INDEX: 32.58 KG/M2 | HEART RATE: 64 BPM | TEMPERATURE: 98 F | DIASTOLIC BLOOD PRESSURE: 67 MMHG | WEIGHT: 214.94 LBS

## 2023-09-28 LAB
ALBUMIN SERPL-MCNC: 3.2 G/DL (ref 3.4–5)
ALBUMIN/GLOB SERPL: 0.9 {RATIO} (ref 1–2)
ALP LIVER SERPL-CCNC: 71 U/L
ALT SERPL-CCNC: 19 U/L
ANION GAP SERPL CALC-SCNC: 5 MMOL/L (ref 0–18)
AST SERPL-CCNC: 10 U/L (ref 15–37)
BASOPHILS # BLD AUTO: 0.04 X10(3) UL (ref 0–0.2)
BASOPHILS NFR BLD AUTO: 0.7 %
BILIRUB SERPL-MCNC: 0.2 MG/DL (ref 0.1–2)
BUN BLD-MCNC: 8 MG/DL (ref 7–18)
CALCIUM BLD-MCNC: 9 MG/DL (ref 8.5–10.1)
CHLORIDE SERPL-SCNC: 106 MMOL/L (ref 98–112)
CO2 SERPL-SCNC: 29 MMOL/L (ref 21–32)
CREAT BLD-MCNC: 0.73 MG/DL
EGFRCR SERPLBLD CKD-EPI 2021: 129 ML/MIN/1.73M2 (ref 60–?)
EOSINOPHIL # BLD AUTO: 0.14 X10(3) UL (ref 0–0.7)
EOSINOPHIL NFR BLD AUTO: 2.6 %
ERYTHROCYTE [DISTWIDTH] IN BLOOD BY AUTOMATED COUNT: 12.4 %
GLOBULIN PLAS-MCNC: 3.7 G/DL (ref 2.8–4.4)
GLUCOSE BLD-MCNC: 102 MG/DL (ref 70–99)
HCT VFR BLD AUTO: 37.3 %
HGB BLD-MCNC: 12.6 G/DL
IMM GRANULOCYTES # BLD AUTO: 0.02 X10(3) UL (ref 0–1)
IMM GRANULOCYTES NFR BLD: 0.4 %
LYMPHOCYTES # BLD AUTO: 2.09 X10(3) UL (ref 1–4)
LYMPHOCYTES NFR BLD AUTO: 38.1 %
MAGNESIUM SERPL-MCNC: 2.1 MG/DL (ref 1.6–2.6)
MCH RBC QN AUTO: 29.2 PG (ref 26–34)
MCHC RBC AUTO-ENTMCNC: 33.8 G/DL (ref 31–37)
MCV RBC AUTO: 86.5 FL
MONOCYTES # BLD AUTO: 0.53 X10(3) UL (ref 0.1–1)
MONOCYTES NFR BLD AUTO: 9.7 %
NEUTROPHILS # BLD AUTO: 2.66 X10 (3) UL (ref 1.5–7.7)
NEUTROPHILS # BLD AUTO: 2.66 X10(3) UL (ref 1.5–7.7)
NEUTROPHILS NFR BLD AUTO: 48.5 %
OSMOLALITY SERPL CALC.SUM OF ELEC: 289 MOSM/KG (ref 275–295)
PLATELET # BLD AUTO: 182 10(3)UL (ref 150–450)
POTASSIUM SERPL-SCNC: 3.9 MMOL/L (ref 3.5–5.1)
PROT SERPL-MCNC: 6.9 G/DL (ref 6.4–8.2)
RBC # BLD AUTO: 4.31 X10(6)UL
SODIUM SERPL-SCNC: 140 MMOL/L (ref 136–145)
WBC # BLD AUTO: 5.5 X10(3) UL (ref 4–11)

## 2023-09-28 PROCEDURE — 99239 HOSP IP/OBS DSCHRG MGMT >30: CPT | Performed by: HOSPITALIST

## 2023-09-28 RX ORDER — HYDROCODONE BITARTRATE AND ACETAMINOPHEN 5; 325 MG/1; MG/1
1 TABLET ORAL EVERY 6 HOURS PRN
Qty: 15 TABLET | Refills: 0 | Status: SHIPPED | OUTPATIENT
Start: 2023-09-28

## 2023-09-28 RX ORDER — HYDROCODONE BITARTRATE AND ACETAMINOPHEN 5; 325 MG/1; MG/1
1 TABLET ORAL EVERY 6 HOURS PRN
Qty: 15 TABLET | Refills: 0 | Status: SHIPPED | OUTPATIENT
Start: 2023-09-28 | End: 2023-09-28

## 2023-09-28 RX ORDER — AMOXICILLIN AND CLAVULANATE POTASSIUM 875; 125 MG/1; MG/1
1 TABLET, FILM COATED ORAL 2 TIMES DAILY
Qty: 12 TABLET | Refills: 0 | Status: SHIPPED | OUTPATIENT
Start: 2023-09-28 | End: 2023-10-04

## 2023-09-28 NOTE — PLAN OF CARE
A&O x4. VSS on RA. . Pain controlled with PO and IV pain medication overnight. Up ad jah in room. Voids freely per urinal. IVF and IV abx infusing as ordered. Tolerating full liq diet, denies N/V. Reviewed POC with pt who agrees. Pt reminded to use call light. Verbalized understanding.

## 2023-09-28 NOTE — PROGRESS NOTES
NURSING DISCHARGE NOTE    Discharged Home via Wheelchair. Accompanied by Support staff  Belongings Taken by patient/family. Patient tolerating soft diet. Cleared for discharge by hospitalist. IV removed. Patient educated on all AVS discharge information. All questions answered. Patient given note for work stating he was in the hospital. Patient brought down to emergency entrance to be driven home by mother. 1547: Phone call received from patients mother that they were unable to  the Norco medication that was sent to [facility] due to it being unavailable. Reached out to Dr. Maxine Doe to have the prescription script sent to [facility] to be picked up for the patient. Dr. Goodman Pod able to send script to [facility]. [facility] called and they are working on order. Patients mother called and updated.

## 2023-09-28 NOTE — PLAN OF CARE
Patient alert and oriented x4. VSS on RA. Pain controlled with PO PRN pain meds. SCDs in place, ankle pumps encouraged. Up ad jah. Voiding freely in urinal/bathroom. Tolerating diet. IVF running per order. LBM 9/28.      Plan: tolerate soft diet, IV abx

## 2023-09-29 ENCOUNTER — PATIENT OUTREACH (OUTPATIENT)
Dept: CASE MANAGEMENT | Age: 28
End: 2023-09-29

## 2023-09-29 DIAGNOSIS — K57.92 ACUTE DIVERTICULITIS: Primary | ICD-10-CM

## 2023-09-29 DIAGNOSIS — Z02.9 ENCOUNTERS FOR ADMINISTRATIVE PURPOSE: ICD-10-CM

## 2023-10-02 ENCOUNTER — OFFICE VISIT (OUTPATIENT)
Dept: INTERNAL MEDICINE CLINIC | Facility: CLINIC | Age: 28
End: 2023-10-02
Payer: COMMERCIAL

## 2023-10-02 VITALS
SYSTOLIC BLOOD PRESSURE: 122 MMHG | RESPIRATION RATE: 16 BRPM | TEMPERATURE: 98 F | HEIGHT: 68 IN | WEIGHT: 222 LBS | OXYGEN SATURATION: 97 % | DIASTOLIC BLOOD PRESSURE: 84 MMHG | BODY MASS INDEX: 33.65 KG/M2 | HEART RATE: 80 BPM

## 2023-10-02 DIAGNOSIS — K57.92 ACUTE DIVERTICULITIS: ICD-10-CM

## 2023-10-02 DIAGNOSIS — Z09 HOSPITAL DISCHARGE FOLLOW-UP: Primary | ICD-10-CM

## 2023-10-02 DIAGNOSIS — Z00.00 ROUTINE GENERAL MEDICAL EXAMINATION AT A HEALTH CARE FACILITY: ICD-10-CM

## 2023-10-02 DIAGNOSIS — Z23 INFLUENZA VACCINE NEEDED: ICD-10-CM

## 2023-10-02 PROBLEM — S90.829A BLISTER OF FOOT: Status: RESOLVED | Noted: 2023-08-24 | Resolved: 2023-10-02

## 2023-10-02 PROBLEM — S86.899A SHIN SPLINTS: Status: RESOLVED | Noted: 2023-08-24 | Resolved: 2023-10-02

## 2023-10-02 PROBLEM — R26.9 GAIT ABNORMALITY: Status: RESOLVED | Noted: 2023-08-24 | Resolved: 2023-10-02

## 2023-10-02 PROCEDURE — 3074F SYST BP LT 130 MM HG: CPT | Performed by: INTERNAL MEDICINE

## 2023-10-02 PROCEDURE — 99496 TRANSJ CARE MGMT HIGH F2F 7D: CPT | Performed by: INTERNAL MEDICINE

## 2023-10-02 PROCEDURE — 3079F DIAST BP 80-89 MM HG: CPT | Performed by: INTERNAL MEDICINE

## 2023-10-02 PROCEDURE — 3008F BODY MASS INDEX DOCD: CPT | Performed by: INTERNAL MEDICINE

## 2023-10-02 PROCEDURE — 90471 IMMUNIZATION ADMIN: CPT | Performed by: INTERNAL MEDICINE

## 2023-10-02 PROCEDURE — 90686 IIV4 VACC NO PRSV 0.5 ML IM: CPT | Performed by: INTERNAL MEDICINE

## 2023-10-23 ENCOUNTER — LAB ENCOUNTER (OUTPATIENT)
Dept: LAB | Facility: HOSPITAL | Age: 28
End: 2023-10-23
Attending: INTERNAL MEDICINE

## 2023-10-23 DIAGNOSIS — Z00.00 ROUTINE GENERAL MEDICAL EXAMINATION AT A HEALTH CARE FACILITY: ICD-10-CM

## 2023-10-23 LAB
ALBUMIN SERPL-MCNC: 3.8 G/DL (ref 3.4–5)
ALBUMIN/GLOB SERPL: 1 {RATIO} (ref 1–2)
ALP LIVER SERPL-CCNC: 89 U/L
ALT SERPL-CCNC: 28 U/L
ANION GAP SERPL CALC-SCNC: 4 MMOL/L (ref 0–18)
AST SERPL-CCNC: 13 U/L (ref 15–37)
BASOPHILS # BLD AUTO: 0.04 X10(3) UL (ref 0–0.2)
BASOPHILS NFR BLD AUTO: 0.6 %
BILIRUB SERPL-MCNC: 0.3 MG/DL (ref 0.1–2)
BILIRUB UR QL STRIP.AUTO: NEGATIVE
BUN BLD-MCNC: 18 MG/DL (ref 7–18)
CALCIUM BLD-MCNC: 9.3 MG/DL (ref 8.5–10.1)
CHLORIDE SERPL-SCNC: 107 MMOL/L (ref 98–112)
CHOLEST SERPL-MCNC: 169 MG/DL (ref ?–200)
CLARITY UR REFRACT.AUTO: CLEAR
CO2 SERPL-SCNC: 27 MMOL/L (ref 21–32)
CREAT BLD-MCNC: 0.85 MG/DL
EGFRCR SERPLBLD CKD-EPI 2021: 123 ML/MIN/1.73M2 (ref 60–?)
EOSINOPHIL # BLD AUTO: 0.17 X10(3) UL (ref 0–0.7)
EOSINOPHIL NFR BLD AUTO: 2.6 %
ERYTHROCYTE [DISTWIDTH] IN BLOOD BY AUTOMATED COUNT: 12.8 %
FASTING PATIENT LIPID ANSWER: YES
FASTING STATUS PATIENT QL REPORTED: YES
GLOBULIN PLAS-MCNC: 3.9 G/DL (ref 2.8–4.4)
GLUCOSE BLD-MCNC: 106 MG/DL (ref 70–99)
GLUCOSE UR STRIP.AUTO-MCNC: NORMAL MG/DL
HCT VFR BLD AUTO: 45 %
HDLC SERPL-MCNC: 33 MG/DL (ref 40–59)
HGB BLD-MCNC: 15.1 G/DL
IMM GRANULOCYTES # BLD AUTO: 0.03 X10(3) UL (ref 0–1)
IMM GRANULOCYTES NFR BLD: 0.5 %
KETONES UR STRIP.AUTO-MCNC: NEGATIVE MG/DL
LDLC SERPL CALC-MCNC: 74 MG/DL (ref ?–100)
LEUKOCYTE ESTERASE UR QL STRIP.AUTO: 25
LYMPHOCYTES # BLD AUTO: 3.3 X10(3) UL (ref 1–4)
LYMPHOCYTES NFR BLD AUTO: 49.7 %
MCH RBC QN AUTO: 29.4 PG (ref 26–34)
MCHC RBC AUTO-ENTMCNC: 33.6 G/DL (ref 31–37)
MCV RBC AUTO: 87.7 FL
MONOCYTES # BLD AUTO: 0.48 X10(3) UL (ref 0.1–1)
MONOCYTES NFR BLD AUTO: 7.2 %
NEUTROPHILS # BLD AUTO: 2.62 X10 (3) UL (ref 1.5–7.7)
NEUTROPHILS # BLD AUTO: 2.62 X10(3) UL (ref 1.5–7.7)
NEUTROPHILS NFR BLD AUTO: 39.4 %
NITRITE UR QL STRIP.AUTO: NEGATIVE
NONHDLC SERPL-MCNC: 136 MG/DL (ref ?–130)
OSMOLALITY SERPL CALC.SUM OF ELEC: 288 MOSM/KG (ref 275–295)
PH UR STRIP.AUTO: 6 [PH] (ref 5–8)
PLATELET # BLD AUTO: 175 10(3)UL (ref 150–450)
POTASSIUM SERPL-SCNC: 4 MMOL/L (ref 3.5–5.1)
PROT SERPL-MCNC: 7.7 G/DL (ref 6.4–8.2)
RBC # BLD AUTO: 5.13 X10(6)UL
RBC UR QL AUTO: NEGATIVE
SODIUM SERPL-SCNC: 138 MMOL/L (ref 136–145)
SP GR UR STRIP.AUTO: 1.02 (ref 1–1.03)
TRIGL SERPL-MCNC: 388 MG/DL (ref 30–149)
TSI SER-ACNC: 2.52 MIU/ML (ref 0.36–3.74)
UROBILINOGEN UR STRIP.AUTO-MCNC: NORMAL MG/DL
VLDLC SERPL CALC-MCNC: 61 MG/DL (ref 0–30)
WBC # BLD AUTO: 6.6 X10(3) UL (ref 4–11)

## 2023-10-23 PROCEDURE — 81001 URINALYSIS AUTO W/SCOPE: CPT

## 2023-10-23 PROCEDURE — 36415 COLL VENOUS BLD VENIPUNCTURE: CPT

## 2023-10-23 PROCEDURE — 80053 COMPREHEN METABOLIC PANEL: CPT

## 2023-10-23 PROCEDURE — 80061 LIPID PANEL: CPT

## 2023-10-23 PROCEDURE — 84443 ASSAY THYROID STIM HORMONE: CPT

## 2023-10-23 PROCEDURE — 85025 COMPLETE CBC W/AUTO DIFF WBC: CPT

## 2023-10-28 ENCOUNTER — OFFICE VISIT (OUTPATIENT)
Dept: INTERNAL MEDICINE CLINIC | Facility: CLINIC | Age: 28
End: 2023-10-28

## 2023-10-28 VITALS
SYSTOLIC BLOOD PRESSURE: 118 MMHG | DIASTOLIC BLOOD PRESSURE: 70 MMHG | HEART RATE: 80 BPM | WEIGHT: 223 LBS | TEMPERATURE: 98 F | BODY MASS INDEX: 33.8 KG/M2 | HEIGHT: 68 IN | RESPIRATION RATE: 16 BRPM | OXYGEN SATURATION: 98 %

## 2023-10-28 DIAGNOSIS — E78.1 HYPERTRIGLYCERIDEMIA: ICD-10-CM

## 2023-10-28 DIAGNOSIS — J45.20 MILD INTERMITTENT ASTHMA WITHOUT COMPLICATION: ICD-10-CM

## 2023-10-28 DIAGNOSIS — Z00.00 ROUTINE PHYSICAL EXAMINATION: Primary | ICD-10-CM

## 2023-10-28 PROCEDURE — 3078F DIAST BP <80 MM HG: CPT | Performed by: PHYSICIAN ASSISTANT

## 2023-10-28 PROCEDURE — 3008F BODY MASS INDEX DOCD: CPT | Performed by: PHYSICIAN ASSISTANT

## 2023-10-28 PROCEDURE — 99395 PREV VISIT EST AGE 18-39: CPT | Performed by: PHYSICIAN ASSISTANT

## 2023-10-28 PROCEDURE — 3074F SYST BP LT 130 MM HG: CPT | Performed by: PHYSICIAN ASSISTANT

## 2023-11-09 ENCOUNTER — LAB ENCOUNTER (OUTPATIENT)
Dept: LAB | Age: 28
End: 2023-11-09
Payer: COMMERCIAL

## 2023-11-09 ENCOUNTER — OFFICE VISIT (OUTPATIENT)
Dept: INTERNAL MEDICINE CLINIC | Facility: CLINIC | Age: 28
End: 2023-11-09
Payer: COMMERCIAL

## 2023-11-09 ENCOUNTER — PATIENT MESSAGE (OUTPATIENT)
Dept: INTERNAL MEDICINE CLINIC | Facility: CLINIC | Age: 28
End: 2023-11-09

## 2023-11-09 VITALS
HEART RATE: 83 BPM | BODY MASS INDEX: 33 KG/M2 | TEMPERATURE: 98 F | OXYGEN SATURATION: 98 % | WEIGHT: 219 LBS | DIASTOLIC BLOOD PRESSURE: 84 MMHG | SYSTOLIC BLOOD PRESSURE: 120 MMHG | RESPIRATION RATE: 18 BRPM

## 2023-11-09 DIAGNOSIS — N20.0 NEPHROLITHIASIS: ICD-10-CM

## 2023-11-09 DIAGNOSIS — N20.0 NEPHROLITHIASIS: Primary | ICD-10-CM

## 2023-11-09 DIAGNOSIS — R30.9 PAINFUL URINATION: ICD-10-CM

## 2023-11-09 LAB
ALBUMIN SERPL-MCNC: 4.1 G/DL (ref 3.4–5)
ALBUMIN/GLOB SERPL: 1.1 {RATIO} (ref 1–2)
ALP LIVER SERPL-CCNC: 88 U/L
ALT SERPL-CCNC: 26 U/L
ANION GAP SERPL CALC-SCNC: 4 MMOL/L (ref 0–18)
AST SERPL-CCNC: 17 U/L (ref 15–37)
BASOPHILS # BLD AUTO: 0.05 X10(3) UL (ref 0–0.2)
BASOPHILS NFR BLD AUTO: 0.7 %
BILIRUB SERPL-MCNC: 0.3 MG/DL (ref 0.1–2)
BILIRUBIN: NEGATIVE
BUN BLD-MCNC: 17 MG/DL (ref 9–23)
CALCIUM BLD-MCNC: 9.6 MG/DL (ref 8.5–10.1)
CHLORIDE SERPL-SCNC: 109 MMOL/L (ref 98–112)
CO2 SERPL-SCNC: 25 MMOL/L (ref 21–32)
CREAT BLD-MCNC: 0.96 MG/DL
EGFRCR SERPLBLD CKD-EPI 2021: 112 ML/MIN/1.73M2 (ref 60–?)
EOSINOPHIL # BLD AUTO: 0.1 X10(3) UL (ref 0–0.7)
EOSINOPHIL NFR BLD AUTO: 1.3 %
ERYTHROCYTE [DISTWIDTH] IN BLOOD BY AUTOMATED COUNT: 12.9 %
FASTING STATUS PATIENT QL REPORTED: NO
GLOBULIN PLAS-MCNC: 3.8 G/DL (ref 2.8–4.4)
GLUCOSE (URINE DIPSTICK): NEGATIVE MG/DL
GLUCOSE BLD-MCNC: 91 MG/DL (ref 70–99)
HCT VFR BLD AUTO: 43.5 %
HGB BLD-MCNC: 15.1 G/DL
IMM GRANULOCYTES # BLD AUTO: 0.03 X10(3) UL (ref 0–1)
IMM GRANULOCYTES NFR BLD: 0.4 %
KETONES (URINE DIPSTICK): NEGATIVE MG/DL
LEUKOCYTES: NEGATIVE
LYMPHOCYTES # BLD AUTO: 2.69 X10(3) UL (ref 1–4)
LYMPHOCYTES NFR BLD AUTO: 35.3 %
MCH RBC QN AUTO: 29.2 PG (ref 26–34)
MCHC RBC AUTO-ENTMCNC: 34.7 G/DL (ref 31–37)
MCV RBC AUTO: 84 FL
MONOCYTES # BLD AUTO: 0.65 X10(3) UL (ref 0.1–1)
MONOCYTES NFR BLD AUTO: 8.5 %
MULTISTIX LOT#: ABNORMAL NUMERIC
NEUTROPHILS # BLD AUTO: 4.09 X10 (3) UL (ref 1.5–7.7)
NEUTROPHILS # BLD AUTO: 4.09 X10(3) UL (ref 1.5–7.7)
NEUTROPHILS NFR BLD AUTO: 53.8 %
NITRITE, URINE: NEGATIVE
OSMOLALITY SERPL CALC.SUM OF ELEC: 287 MOSM/KG (ref 275–295)
PH, URINE: 7 (ref 4.5–8)
PLATELET # BLD AUTO: 207 10(3)UL (ref 150–450)
POTASSIUM SERPL-SCNC: 4.1 MMOL/L (ref 3.5–5.1)
PROT SERPL-MCNC: 7.9 G/DL (ref 6.4–8.2)
PROTEIN (URINE DIPSTICK): NEGATIVE MG/DL
RBC # BLD AUTO: 5.18 X10(6)UL
SODIUM SERPL-SCNC: 138 MMOL/L (ref 136–145)
SPECIFIC GRAVITY: 1.02 (ref 1–1.03)
URINE-COLOR: YELLOW
UROBILINOGEN,SEMI-QN: 0.2 MG/DL (ref 0–1.9)
WBC # BLD AUTO: 7.6 X10(3) UL (ref 4–11)

## 2023-11-09 PROCEDURE — 80053 COMPREHEN METABOLIC PANEL: CPT

## 2023-11-09 PROCEDURE — 81003 URINALYSIS AUTO W/O SCOPE: CPT

## 2023-11-09 PROCEDURE — 99214 OFFICE O/P EST MOD 30 MIN: CPT

## 2023-11-09 PROCEDURE — 3079F DIAST BP 80-89 MM HG: CPT

## 2023-11-09 PROCEDURE — 82365 CALCULUS SPECTROSCOPY: CPT

## 2023-11-09 PROCEDURE — 85025 COMPLETE CBC W/AUTO DIFF WBC: CPT

## 2023-11-09 PROCEDURE — 3074F SYST BP LT 130 MM HG: CPT

## 2023-11-09 RX ORDER — TAMSULOSIN HYDROCHLORIDE 0.4 MG/1
0.4 CAPSULE ORAL DAILY
Qty: 30 CAPSULE | Refills: 0 | Status: SHIPPED | OUTPATIENT
Start: 2023-11-09 | End: 2023-12-09

## 2023-11-09 RX ORDER — OLOPATADINE HYDROCHLORIDE 1 MG/ML
SOLUTION/ DROPS OPHTHALMIC 2 TIMES DAILY
COMMUNITY

## 2023-11-09 RX ORDER — TRAMADOL HYDROCHLORIDE 50 MG/1
50 TABLET ORAL EVERY 6 HOURS PRN
Qty: 30 TABLET | Refills: 1 | Status: SHIPPED | OUTPATIENT
Start: 2023-11-09

## 2023-11-09 NOTE — TELEPHONE ENCOUNTER
Contacted patient via phone to discuss results. Patient has hx of kidney stones. Woke up this am with lower abdominal pain. First urination was painful and he noticed scant blood and dark urine color. OV scheduled today for 2pm with RUDDY Santos. Informed patient to continue to push fluids to flush kidneys and prn otc pain medication as needed until evaluated today.

## 2023-11-10 ENCOUNTER — HOSPITAL ENCOUNTER (OUTPATIENT)
Dept: CT IMAGING | Facility: HOSPITAL | Age: 28
Discharge: HOME | End: 2023-11-10
Payer: COMMERCIAL

## 2023-11-10 DIAGNOSIS — N20.0 KIDNEY STONE: Primary | ICD-10-CM

## 2023-11-10 DIAGNOSIS — R30.9 PAINFUL URINATION: ICD-10-CM

## 2023-11-10 DIAGNOSIS — N20.0 NEPHROLITHIASIS: ICD-10-CM

## 2023-11-10 PROCEDURE — 74178 CT ABD&PLV WO CNTR FLWD CNTR: CPT

## 2023-11-10 PROCEDURE — 76377 3D RENDER W/INTRP POSTPROCES: CPT

## 2023-11-10 RX ORDER — HYDROCODONE BITARTRATE AND ACETAMINOPHEN 10; 325 MG/1; MG/1
1 TABLET ORAL EVERY 8 HOURS PRN
Qty: 6 TABLET | Refills: 0 | Status: SHIPPED | OUTPATIENT
Start: 2023-11-10

## 2023-11-10 NOTE — TELEPHONE ENCOUNTER
Per pt's mother passed part of a kidney stone this morning, but still having increased pelvic pain    INSIGHT does not have anything available until next week    Wants to know if we can change to STAT for [facility]

## 2023-11-10 NOTE — TELEPHONE ENCOUNTER
Per RUDDY Crow: His kidney function is normal so not worry about his kidney's being affected by the stone. At this point is just pushing fluids, taking Flomax, and pain management. If tramadol is not working could send over [employer] 10/325 6 tabs. If pain is not controlled with oral medication then to ED for pain management. Per RUDDY Crow: Order changed to STAT. Mother made aware of plan of care, notified to keep small passed stone as Ryan Mccloud wants to test stone. STAT scheduled for 11:30 at [facility]. Mother and patient made aware.  Norco sent to provider for approval.

## 2023-11-11 NOTE — TELEPHONE ENCOUNTER
COVID Test placed. Patient informed via my chart.
From: Lucio Morley  Sent: 1/7/2022 9:59 AM CST  To: [facility] Clinical Staff  Subject: Sore throat fever cough    No, I have not gotten a test since these symptoms have started.  Do I need a script to go get a covid test?
Patient continuing to have complaints of sore throat and fever. Video visit scheduled today with [facility] for further evaluation.
37.3

## 2023-12-01 ENCOUNTER — TELEPHONE (OUTPATIENT)
Dept: INTERNAL MEDICINE CLINIC | Facility: CLINIC | Age: 28
End: 2023-12-01

## 2023-12-01 DIAGNOSIS — N20.0 URIC ACID NEPHROLITHIASIS: Primary | ICD-10-CM

## 2023-12-02 ENCOUNTER — LAB ENCOUNTER (OUTPATIENT)
Dept: LAB | Age: 28
End: 2023-12-02
Payer: COMMERCIAL

## 2023-12-02 DIAGNOSIS — N20.0 KIDNEY STONE: ICD-10-CM

## 2023-12-03 PROCEDURE — 88300 SURGICAL PATH GROSS: CPT | Performed by: INTERNAL MEDICINE

## 2023-12-05 ENCOUNTER — TELEPHONE (OUTPATIENT)
Dept: INTERNAL MEDICINE CLINIC | Facility: CLINIC | Age: 28
End: 2023-12-05

## 2023-12-05 DIAGNOSIS — N20.0 NEPHROLITHIASIS: ICD-10-CM

## 2023-12-05 NOTE — TELEPHONE ENCOUNTER
Medication requested: tamsulosin 0.4 MG Oral Cap     Is patient requesting 30 or 90 day supply:  90    Pharmacy name/location:  [facility]Y #[id number] [address] AT [facility], [phone], [phone] [[id number]]     LOV:  11/09/2023-Carissa      Is the patient due for appointment: No  (if so, please schedule)    Additional notes:  No

## 2023-12-05 NOTE — TELEPHONE ENCOUNTER
CPE 3/20/19     Last refill Rosuvastatin 20 4/8/19 historically entered                Carvediol 6.25 5/1/18 # 180 +3                Amlodipine 5 5/1/18 # 90 +3                    Omeprazole 10 5/1/18 # 90 +3 Rx refill not appropriate

## 2023-12-13 LAB
CAOX DIHYDRATE: 50 %
CAOX MONOHYDRATE: 40 %
HYDROXYAPATITE: 10 %
WEIGHT-STONE: 18 MG

## 2024-03-11 ENCOUNTER — HOSPITAL ENCOUNTER (EMERGENCY)
Facility: HOSPITAL | Age: 29
Discharge: HOME | End: 2024-03-11
Attending: EMERGENCY MEDICINE
Payer: COMMERCIAL

## 2024-03-11 ENCOUNTER — APPOINTMENT (OUTPATIENT)
Dept: CT IMAGING | Facility: HOSPITAL | Age: 29
End: 2024-03-11
Attending: EMERGENCY MEDICINE
Payer: COMMERCIAL

## 2024-03-11 VITALS
DIASTOLIC BLOOD PRESSURE: 79 MMHG | RESPIRATION RATE: 16 BRPM | HEART RATE: 85 BPM | WEIGHT: 215 LBS | TEMPERATURE: 97 F | SYSTOLIC BLOOD PRESSURE: 125 MMHG | HEIGHT: 69 IN | BODY MASS INDEX: 31.84 KG/M2 | OXYGEN SATURATION: 100 %

## 2024-03-11 DIAGNOSIS — R10.32 ABDOMINAL PAIN, LEFT LOWER QUADRANT: Primary | ICD-10-CM

## 2024-03-11 LAB
ALBUMIN SERPL-MCNC: 4.5 G/DL (ref 3.4–5)
ALBUMIN/GLOB SERPL: 1.2 {RATIO} (ref 1–2)
ALP LIVER SERPL-CCNC: 87 U/L
ALT SERPL-CCNC: 23 U/L
ANION GAP SERPL CALC-SCNC: 8 MMOL/L (ref 0–18)
AST SERPL-CCNC: 12 U/L (ref 15–37)
BASOPHILS # BLD AUTO: 0.02 X10(3) UL (ref 0–0.2)
BASOPHILS NFR BLD AUTO: 0.2 %
BILIRUB SERPL-MCNC: 0.7 MG/DL (ref 0.1–2)
BILIRUB UR QL STRIP.AUTO: NEGATIVE
BUN BLD-MCNC: 16 MG/DL (ref 9–23)
CALCIUM BLD-MCNC: 9.9 MG/DL (ref 8.5–10.1)
CHLORIDE SERPL-SCNC: 109 MMOL/L (ref 98–112)
CLARITY UR REFRACT.AUTO: CLEAR
CO2 SERPL-SCNC: 20 MMOL/L (ref 21–32)
CREAT BLD-MCNC: 1.04 MG/DL
EGFRCR SERPLBLD CKD-EPI 2021: 102 ML/MIN/1.73M2 (ref 60–?)
EOSINOPHIL # BLD AUTO: 0 X10(3) UL (ref 0–0.7)
EOSINOPHIL NFR BLD AUTO: 0 %
ERYTHROCYTE [DISTWIDTH] IN BLOOD BY AUTOMATED COUNT: 13.2 %
GLOBULIN PLAS-MCNC: 3.9 G/DL (ref 2.8–4.4)
GLUCOSE BLD-MCNC: 114 MG/DL (ref 70–99)
GLUCOSE UR STRIP.AUTO-MCNC: NORMAL MG/DL
HCT VFR BLD AUTO: 46.3 %
HGB BLD-MCNC: 16.4 G/DL
IMM GRANULOCYTES # BLD AUTO: 0.03 X10(3) UL (ref 0–1)
IMM GRANULOCYTES NFR BLD: 0.4 %
KETONES UR STRIP.AUTO-MCNC: 20 MG/DL
LEUKOCYTE ESTERASE UR QL STRIP.AUTO: NEGATIVE
LIPASE SERPL-CCNC: 37 U/L (ref 13–75)
LYMPHOCYTES # BLD AUTO: 0.73 X10(3) UL (ref 1–4)
LYMPHOCYTES NFR BLD AUTO: 8.7 %
MCH RBC QN AUTO: 29.3 PG (ref 26–34)
MCHC RBC AUTO-ENTMCNC: 35.4 G/DL (ref 31–37)
MCV RBC AUTO: 82.7 FL
MONOCYTES # BLD AUTO: 0.55 X10(3) UL (ref 0.1–1)
MONOCYTES NFR BLD AUTO: 6.6 %
NEUTROPHILS # BLD AUTO: 7.05 X10 (3) UL (ref 1.5–7.7)
NEUTROPHILS # BLD AUTO: 7.05 X10(3) UL (ref 1.5–7.7)
NEUTROPHILS NFR BLD AUTO: 84.1 %
NITRITE UR QL STRIP.AUTO: NEGATIVE
OSMOLALITY SERPL CALC.SUM OF ELEC: 286 MOSM/KG (ref 275–295)
PH UR STRIP.AUTO: 8 [PH] (ref 5–8)
PLATELET # BLD AUTO: 217 10(3)UL (ref 150–450)
POTASSIUM SERPL-SCNC: 4 MMOL/L (ref 3.5–5.1)
PROT SERPL-MCNC: 8.4 G/DL (ref 6.4–8.2)
PROT UR STRIP.AUTO-MCNC: 20 MG/DL
RBC # BLD AUTO: 5.6 X10(6)UL
RBC UR QL AUTO: NEGATIVE
SODIUM SERPL-SCNC: 137 MMOL/L (ref 136–145)
SP GR UR STRIP.AUTO: 1.02 (ref 1–1.03)
UROBILINOGEN UR STRIP.AUTO-MCNC: NORMAL MG/DL
WBC # BLD AUTO: 8.4 X10(3) UL (ref 4–11)

## 2024-03-11 PROCEDURE — 83690 ASSAY OF LIPASE: CPT | Performed by: EMERGENCY MEDICINE

## 2024-03-11 PROCEDURE — 99284 EMERGENCY DEPT VISIT MOD MDM: CPT

## 2024-03-11 PROCEDURE — 74177 CT ABD & PELVIS W/CONTRAST: CPT | Performed by: EMERGENCY MEDICINE

## 2024-03-11 PROCEDURE — 81001 URINALYSIS AUTO W/SCOPE: CPT | Performed by: EMERGENCY MEDICINE

## 2024-03-11 PROCEDURE — 96361 HYDRATE IV INFUSION ADD-ON: CPT

## 2024-03-11 PROCEDURE — 96374 THER/PROPH/DIAG INJ IV PUSH: CPT

## 2024-03-11 PROCEDURE — 80053 COMPREHEN METABOLIC PANEL: CPT | Performed by: EMERGENCY MEDICINE

## 2024-03-11 PROCEDURE — 85025 COMPLETE CBC W/AUTO DIFF WBC: CPT

## 2024-03-11 PROCEDURE — 99285 EMERGENCY DEPT VISIT HI MDM: CPT

## 2024-03-11 PROCEDURE — 96375 TX/PRO/DX INJ NEW DRUG ADDON: CPT

## 2024-03-11 PROCEDURE — S0119 ONDANSETRON 4 MG: HCPCS

## 2024-03-11 PROCEDURE — 83690 ASSAY OF LIPASE: CPT

## 2024-03-11 PROCEDURE — 85025 COMPLETE CBC W/AUTO DIFF WBC: CPT | Performed by: EMERGENCY MEDICINE

## 2024-03-11 PROCEDURE — 80053 COMPREHEN METABOLIC PANEL: CPT

## 2024-03-11 RX ORDER — ONDANSETRON 2 MG/ML
4 INJECTION INTRAMUSCULAR; INTRAVENOUS ONCE
Status: COMPLETED | OUTPATIENT
Start: 2024-03-11 | End: 2024-03-11

## 2024-03-11 RX ORDER — ONDANSETRON 2 MG/ML
4 INJECTION INTRAMUSCULAR; INTRAVENOUS ONCE
Status: DISCONTINUED | OUTPATIENT
Start: 2024-03-11 | End: 2024-03-11

## 2024-03-11 RX ORDER — LEVOFLOXACIN 750 MG/1
750 TABLET, FILM COATED ORAL DAILY
Qty: 10 TABLET | Refills: 0 | Status: SHIPPED | OUTPATIENT
Start: 2024-03-11 | End: 2024-03-21

## 2024-03-11 RX ORDER — IOHEXOL 350 MG/ML
100 INJECTION, SOLUTION INTRAVENOUS
Status: COMPLETED | OUTPATIENT
Start: 2024-03-11 | End: 2024-03-11

## 2024-03-11 RX ORDER — ONDANSETRON 4 MG/1
TABLET, ORALLY DISINTEGRATING ORAL
Status: COMPLETED
Start: 2024-03-11 | End: 2024-03-11

## 2024-03-11 RX ORDER — METRONIDAZOLE 500 MG/1
500 TABLET ORAL 3 TIMES DAILY
Qty: 30 TABLET | Refills: 0 | Status: SHIPPED | OUTPATIENT
Start: 2024-03-11 | End: 2024-03-21

## 2024-03-11 RX ORDER — ONDANSETRON 4 MG/1
4 TABLET, ORALLY DISINTEGRATING ORAL ONCE
Status: COMPLETED | OUTPATIENT
Start: 2024-03-11 | End: 2024-03-11

## 2024-03-11 RX ORDER — HYDROCODONE BITARTRATE AND ACETAMINOPHEN 5; 325 MG/1; MG/1
1-2 TABLET ORAL EVERY 6 HOURS PRN
Qty: 10 TABLET | Refills: 0 | Status: SHIPPED | OUTPATIENT
Start: 2024-03-11 | End: 2024-03-18

## 2024-03-11 RX ORDER — HYDROMORPHONE HYDROCHLORIDE 1 MG/ML
0.5 INJECTION, SOLUTION INTRAMUSCULAR; INTRAVENOUS; SUBCUTANEOUS EVERY 30 MIN PRN
Status: DISCONTINUED | OUTPATIENT
Start: 2024-03-11 | End: 2024-03-11

## 2024-03-11 NOTE — ED QUICK NOTES
Rounding Completed. Report received from LARRY Guardado.     Plan of Care reviewed. Waiting for CT.  Elimination needs assessed.  Provided updated. Family at bedside.     Bed is locked and in lowest position. Call light within reach.

## 2024-03-11 NOTE — DISCHARGE INSTRUCTIONS
Follow-up for further evaluation with primary physician.  Call for an appointment.  Clear liquid diet as discussed.  Antibiotics as prescribed.  Pain medicines as discussed.  Return if vomiting, blood in the stool, increased pain, new or worse symptoms.

## 2024-03-11 NOTE — ED PROVIDER NOTES
Patient Seen in: [facility] Emergency Department      History     Chief Complaint   Patient presents with    Abdomen/Flank Pain    Nausea/Vomiting/Diarrhea     Stated Complaint: abdominal pain, vomiting, hx of diverticulitis, feels the same    Subjective:   HPI    Patient is a 28-year-old male who has history of diverticulitis.  Patient states yesterday he started having some pain left lower quadrant.  Patient states he has vomited 10+ times.  Patient reports normal bowel movements.  Patient states pain is up to 8 out of 10 sharp constant.  Patient states pain is worse with pushing on the left lower quadrant.  Patient does have history of kidney stones but states this is different.  Patient did have a low-grade fever 99.8 last night.  No chest pain, shortness of breath, no hematuria dysuria, remainder of review of systems negative.    Objective:   Past Medical History:   Diagnosis Date    Allergic rhinitis     Anxiety     Asthma ([facility])     Bipolar affective ([facility])     Displaced fracture of base of fifth metacarpal bone of right hand with delayed healing 10/21/2020    Esophageal reflux     Extrinsic asthma, unspecified     Hepatitis, alcoholic, acute (HCC) 6/24/2018    History of depression     PONV (postoperative nausea and vomiting)     Visual impairment     glasses/contacts              Past Surgical History:   Procedure Laterality Date    ADENOIDECTOMY  2010    FRACTURE SURGERY      OTHER SURGICAL HISTORY  08/18/2017    balloon sinuplasty    TONSILLECTOMY  2010    UPPER GI ENDOSCOPY - REFERRAL                  Social History     Socioeconomic History    Marital status: Single   Tobacco Use    Smoking status: Never     Passive exposure: Never    Smokeless tobacco: Never   Vaping Use    Vaping Use: Former    Substances: Nicotine, THC    Devices: Pre-filled pod   Substance and Sexual Activity    Alcohol use: Not Currently     Alcohol/week: 0.0 standard drinks of alcohol    Drug use: Yes     Types: Cannabis      Comment: medically perscribed marijuana for depression treatment   Other Topics Concern    Caffeine Concern Yes     Comment: occ    Exercise Yes     Comment: 3 times per week     Seat Belt Yes     Social Determinants of Health     Financial Resource Strain: Low Risk  (9/29/2023)    Financial Resource Strain     Difficulty of Paying Living Expenses: Not hard at all     Med Affordability: No   Transportation Needs: No Transportation Needs (9/29/2023)    Transportation Needs     Lack of Transportation: No              Review of Systems    Positive for stated complaint: abdominal pain, vomiting, hx of diverticulitis, feels the same  Other systems are as noted in HPI.  Constitutional and vital signs reviewed.      All other systems reviewed and negative except as noted above.    Physical Exam     ED Triage Vitals [03/11/24 1311]   /79   Pulse 90   Resp 16   Temp 97.1 °F (36.2 °C)   Temp src Temporal   SpO2 98 %   O2 Device None (Room air)       Current:/70   Pulse 86   Temp 97.1 °F (36.2 °C) (Temporal)   Resp 16   Ht 175.3 cm (5' 9\")   Wt 97.5 kg   SpO2 99%   BMI 31.75 kg/m²         Physical Exam  GENERAL: Patient resting on the cart in no acute distress.  HEENT: Extraocular muscles intact, pupils equal round reactive to light, neck supple, no meningismus.  LUNGS: Lungs clear to auscultation bilaterally.  CARDIOVASCULAR: + S1-S2, regular rate and rhythm, no murmurs.  BACK: No CVA tenderness, no midline bony tenderness.  ABDOMEN: + Bowel sounds, soft, moderate tenderness to left lower quadrant left flank, nondistended.  No rebound, no guarding, no hepatosplenomegaly.  EXTREMITIES: Full range of motion, no tenderness, good capillary refill.  SKIN: No rash, good turgor.  NEURO: Patient answers questions appropriately.  No focal deficits appreciated.           ED Course     Labs Reviewed   COMP METABOLIC PANEL (14) - Abnormal; Notable for the following components:       Result Value    Glucose 114 (*)      CO2 20.0 (*)     AST 12 (*)     Total Protein 8.4 (*)     All other components within normal limits   URINALYSIS, ROUTINE - Abnormal; Notable for the following components:    Ketones Urine 20 (*)     Protein Urine 20 (*)     All other components within normal limits   CBC W/ DIFFERENTIAL - Abnormal; Notable for the following components:    Lymphocyte Absolute 0.73 (*)     All other components within normal limits   LIPASE - Normal   CBC WITH DIFFERENTIAL WITH PLATELET    Narrative:     The following orders were created for panel order CBC With Differential With Platelet.  Procedure                               Abnormality         Status                     ---------                               -----------         ------                     CBC W/ DIFFERENTIAL[[id number]]          Abnormal            Final result                 Please view results for these tests on the individual orders.   RAINBOW DRAW LAVENDER   RAINBOW DRAW LIGHT GREEN   RAINBOW DRAW BLUE             CT abdomen pelvisNo acute process identified within the abdomen or pelvis.      Independent reviewed by myself, no free air       MDM        Patient given IV fluids.  Patient given Dilaudid and Zofran.  Patient did feel improved.  Patient had normal white count.  CT no acute abnormality but did have diverticulosis.  Patient does have tenderness left lower quadrant with temp 99.8 at home history of diverticulitis discussed options with patient and father we will proceed with treatment of potential early diverticulitis with Levaquin and Flagyl.  Patient states he can take ibuprofen we will try that first.  Norco only if needed.  Recommend reevaluation with primary physician in 2 days.  Return if vomiting, increased pain, blood in stool, new or worse symptoms.  I did consider diverticulitis, kidney stone.                                 Medical Decision Making      Disposition and Plan     Clinical Impression:  1. Abdominal pain, left lower quadrant          Disposition:  Discharge  3/11/2024  3:49 pm    Follow-up:  Thang Mitchell MD  [address] [place] [id number] [phone]    Follow up in 2 day(s)            Medications Prescribed:  Current Discharge Medication List        START taking these medications    Details   levoFLOXacin 750 MG Oral Tab Take 1 tablet (750 mg total) by mouth daily for 10 days.  Qty: 10 tablet, Refills: 0      metRONIDAZOLE 500 MG Oral Tab Take 1 tablet (500 mg total) by mouth 3 (three) times daily for 10 days.  Qty: 30 tablet, Refills: 0      HYDROcodone-acetaminophen 5-325 MG Oral Tab Take 1-2 tablets by mouth every 6 (six) hours as needed.  Qty: 10 tablet, Refills: 0    Associated Diagnoses: Abdominal pain, left lower quadrant

## 2024-03-13 ENCOUNTER — APPOINTMENT (OUTPATIENT)
Dept: CT IMAGING | Facility: HOSPITAL | Age: 29
End: 2024-03-13
Attending: STUDENT IN AN ORGANIZED HEALTH CARE EDUCATION/TRAINING PROGRAM
Payer: COMMERCIAL

## 2024-03-13 ENCOUNTER — HOSPITAL ENCOUNTER (EMERGENCY)
Facility: HOSPITAL | Age: 29
Discharge: HOME | End: 2024-03-13
Attending: STUDENT IN AN ORGANIZED HEALTH CARE EDUCATION/TRAINING PROGRAM
Payer: COMMERCIAL

## 2024-03-13 VITALS
TEMPERATURE: 98 F | OXYGEN SATURATION: 99 % | SYSTOLIC BLOOD PRESSURE: 119 MMHG | HEART RATE: 75 BPM | DIASTOLIC BLOOD PRESSURE: 71 MMHG | RESPIRATION RATE: 14 BRPM

## 2024-03-13 DIAGNOSIS — K57.92 ACUTE DIVERTICULITIS: Primary | ICD-10-CM

## 2024-03-13 LAB
ALBUMIN SERPL-MCNC: 3.9 G/DL (ref 3.4–5)
ALBUMIN/GLOB SERPL: 1 {RATIO} (ref 1–2)
ALP LIVER SERPL-CCNC: 72 U/L
ALT SERPL-CCNC: 22 U/L
ANION GAP SERPL CALC-SCNC: 9 MMOL/L (ref 0–18)
AST SERPL-CCNC: 17 U/L (ref 15–37)
BASOPHILS # BLD AUTO: 0.03 X10(3) UL (ref 0–0.2)
BASOPHILS NFR BLD AUTO: 0.6 %
BILIRUB SERPL-MCNC: 0.6 MG/DL (ref 0.1–2)
BUN BLD-MCNC: 10 MG/DL (ref 9–23)
CALCIUM BLD-MCNC: 9.6 MG/DL (ref 8.5–10.1)
CHLORIDE SERPL-SCNC: 108 MMOL/L (ref 98–112)
CO2 SERPL-SCNC: 21 MMOL/L (ref 21–32)
CREAT BLD-MCNC: 1.05 MG/DL
EGFRCR SERPLBLD CKD-EPI 2021: 100 ML/MIN/1.73M2 (ref 60–?)
EOSINOPHIL # BLD AUTO: 0.04 X10(3) UL (ref 0–0.7)
EOSINOPHIL NFR BLD AUTO: 0.7 %
ERYTHROCYTE [DISTWIDTH] IN BLOOD BY AUTOMATED COUNT: 13.3 %
GLOBULIN PLAS-MCNC: 3.8 G/DL (ref 2.8–4.4)
GLUCOSE BLD-MCNC: 120 MG/DL (ref 70–99)
HCT VFR BLD AUTO: 45.1 %
HGB BLD-MCNC: 15.5 G/DL
IMM GRANULOCYTES # BLD AUTO: 0.03 X10(3) UL (ref 0–1)
IMM GRANULOCYTES NFR BLD: 0.6 %
LIPASE SERPL-CCNC: 19 U/L (ref 13–75)
LYMPHOCYTES # BLD AUTO: 1.53 X10(3) UL (ref 1–4)
LYMPHOCYTES NFR BLD AUTO: 28.1 %
MCH RBC QN AUTO: 29 PG (ref 26–34)
MCHC RBC AUTO-ENTMCNC: 34.4 G/DL (ref 31–37)
MCV RBC AUTO: 84.5 FL
MONOCYTES # BLD AUTO: 0.72 X10(3) UL (ref 0.1–1)
MONOCYTES NFR BLD AUTO: 13.2 %
NEUTROPHILS # BLD AUTO: 3.1 X10 (3) UL (ref 1.5–7.7)
NEUTROPHILS # BLD AUTO: 3.1 X10(3) UL (ref 1.5–7.7)
NEUTROPHILS NFR BLD AUTO: 56.8 %
OSMOLALITY SERPL CALC.SUM OF ELEC: 286 MOSM/KG (ref 275–295)
PLATELET # BLD AUTO: 193 10(3)UL (ref 150–450)
POTASSIUM SERPL-SCNC: 3.8 MMOL/L (ref 3.5–5.1)
PROT SERPL-MCNC: 7.7 G/DL (ref 6.4–8.2)
RBC # BLD AUTO: 5.34 X10(6)UL
SODIUM SERPL-SCNC: 138 MMOL/L (ref 136–145)
WBC # BLD AUTO: 5.5 X10(3) UL (ref 4–11)

## 2024-03-13 PROCEDURE — 83690 ASSAY OF LIPASE: CPT | Performed by: STUDENT IN AN ORGANIZED HEALTH CARE EDUCATION/TRAINING PROGRAM

## 2024-03-13 PROCEDURE — 96374 THER/PROPH/DIAG INJ IV PUSH: CPT

## 2024-03-13 PROCEDURE — 96375 TX/PRO/DX INJ NEW DRUG ADDON: CPT

## 2024-03-13 PROCEDURE — 85025 COMPLETE CBC W/AUTO DIFF WBC: CPT | Performed by: STUDENT IN AN ORGANIZED HEALTH CARE EDUCATION/TRAINING PROGRAM

## 2024-03-13 PROCEDURE — 80053 COMPREHEN METABOLIC PANEL: CPT | Performed by: STUDENT IN AN ORGANIZED HEALTH CARE EDUCATION/TRAINING PROGRAM

## 2024-03-13 PROCEDURE — 99285 EMERGENCY DEPT VISIT HI MDM: CPT

## 2024-03-13 PROCEDURE — 99284 EMERGENCY DEPT VISIT MOD MDM: CPT

## 2024-03-13 PROCEDURE — 96361 HYDRATE IV INFUSION ADD-ON: CPT

## 2024-03-13 PROCEDURE — 74177 CT ABD & PELVIS W/CONTRAST: CPT | Performed by: STUDENT IN AN ORGANIZED HEALTH CARE EDUCATION/TRAINING PROGRAM

## 2024-03-13 RX ORDER — KETOROLAC TROMETHAMINE 15 MG/ML
15 INJECTION, SOLUTION INTRAMUSCULAR; INTRAVENOUS ONCE
Status: COMPLETED | OUTPATIENT
Start: 2024-03-13 | End: 2024-03-13

## 2024-03-13 RX ORDER — DROPERIDOL 2.5 MG/ML
2.5 INJECTION, SOLUTION INTRAMUSCULAR; INTRAVENOUS ONCE
Status: COMPLETED | OUTPATIENT
Start: 2024-03-13 | End: 2024-03-13

## 2024-03-13 RX ORDER — ONDANSETRON 2 MG/ML
INJECTION INTRAMUSCULAR; INTRAVENOUS
Status: DISCONTINUED
Start: 2024-03-13 | End: 2024-03-13

## 2024-03-13 RX ORDER — IOHEXOL 350 MG/ML
100 INJECTION, SOLUTION INTRAVENOUS
Status: COMPLETED | OUTPATIENT
Start: 2024-03-13 | End: 2024-03-13

## 2024-03-13 RX ORDER — DIPHENHYDRAMINE HYDROCHLORIDE 50 MG/ML
25 INJECTION INTRAMUSCULAR; INTRAVENOUS ONCE
Status: COMPLETED | OUTPATIENT
Start: 2024-03-13 | End: 2024-03-13

## 2024-03-13 RX ORDER — LORAZEPAM 2 MG/ML
1 INJECTION INTRAMUSCULAR ONCE
Status: COMPLETED | OUTPATIENT
Start: 2024-03-13 | End: 2024-03-13

## 2024-03-13 RX ORDER — ONDANSETRON 2 MG/ML
4 INJECTION INTRAMUSCULAR; INTRAVENOUS ONCE
Status: DISCONTINUED | OUTPATIENT
Start: 2024-03-13 | End: 2024-03-13

## 2024-03-13 NOTE — ED INITIAL ASSESSMENT (HPI)
Pt c/o N/V and LLQ abd pain that began Monday. Pt reports vomiting began around 0430 this morning. Pt last BM was around 1130pm yesterday but denies blood in stool.  Pt reports he was in ER on Monday for diverticulitis but the symptoms have not resolved.

## 2024-03-13 NOTE — ED PROVIDER NOTES
History     Chief Complaint   Patient presents with    Nausea/Vomiting/Diarrhea       HPI    28 year old male presents with left lower quadrant cramping abdominal pain associated with numerous episodes of emesis.  Patient has had prior diverticulitis and reports this felt similar, he was seen in the ER 2 days ago, was prophylactically started on antibiotics and prescribed Norco for pain.  He is here with persistent symptoms.  He does occasionally smoke marijuana, reports after his last diverticulitis he had a colonoscopy by GI.          Past Medical History:   Diagnosis Date    Allergic rhinitis     Anxiety     Asthma (HCC)     Bipolar affective ([facility])     Displaced fracture of base of fifth metacarpal bone of right hand with delayed healing 10/21/2020    Esophageal reflux     Extrinsic asthma, unspecified     Hepatitis, alcoholic, acute (HCC) 6/24/2018    History of depression     PONV (postoperative nausea and vomiting)     Visual impairment     glasses/contacts       Past Surgical History:   Procedure Laterality Date    ADENOIDECTOMY  2010    FRACTURE SURGERY      OTHER SURGICAL HISTORY  08/18/2017    balloon sinuplasty    TONSILLECTOMY  2010    UPPER GI ENDOSCOPY - REFERRAL         Social History     Socioeconomic History    Marital status: Single   Tobacco Use    Smoking status: Never     Passive exposure: Never    Smokeless tobacco: Never   Vaping Use    Vaping Use: Former    Substances: Nicotine, THC    Devices: Pre-filled pod   Substance and Sexual Activity    Alcohol use: Not Currently     Alcohol/week: 0.0 standard drinks of alcohol    Drug use: Yes     Types: Cannabis     Comment: medically perscribed marijuana for depression treatment   Other Topics Concern    Caffeine Concern Yes     Comment: occ    Exercise Yes     Comment: 3 times per week     Seat Belt Yes     Social Determinants of Health     Financial Resource Strain: Low Risk  (9/29/2023)    Financial Resource Strain     Difficulty of Paying  Living Expenses: Not hard at all     Med Affordability: No   Transportation Needs: No Transportation Needs (9/29/2023)    Transportation Needs     Lack of Transportation: No                   Physical Exam     ED Triage Vitals [03/13/24 0700]   BP (!) 144/91   Pulse 91   Resp 22   Temp 97.9 °F (36.6 °C)   Temp src Oral   SpO2 99 %   O2 Device None (Room air)       Physical Exam  Constitutional:       General: He is in acute distress.      Comments: Dry heaving during examination   Eyes:      Extraocular Movements: Extraocular movements intact.   Cardiovascular:      Rate and Rhythm: Normal rate.      Pulses: Normal pulses.   Pulmonary:      Effort: Pulmonary effort is normal. No respiratory distress.   Abdominal:      Tenderness: There is abdominal tenderness in the left lower quadrant.   Musculoskeletal:      Cervical back: Normal range of motion.   Neurological:      General: No focal deficit present.      Mental Status: He is alert.              ED Course     Labs Reviewed   COMP METABOLIC PANEL (14) - Abnormal; Notable for the following components:       Result Value    Glucose 120 (*)     All other components within normal limits   LIPASE - Normal   CBC WITH DIFFERENTIAL WITH PLATELET    Narrative:     The following orders were created for panel order CBC With Differential With Platelet.  Procedure                               Abnormality         Status                     ---------                               -----------         ------                     CBC W/ DIFFERENTIAL[[id number]]                              Final result                 Please view results for these tests on the individual orders.   RAINBOW DRAW LAVENDER   RAINBOW DRAW LIGHT GREEN   RAINBOW DRAW BLUE   RAINBOW DRAW GOLD   CBC W/ DIFFERENTIAL     CT ABDOMEN+PELVIS(CONTRAST ONLY)(CPT=74177)    Result Date: 3/13/2024  CONCLUSION:   There is a trace amount of fat stranding at the posterior mid aspect of the sigmoid colon which may  represent mild or early acute diverticulitis.   There is a 3.5 cm cystic focus abutting the anterior margin of the right hepatic lobe, unchanged since 3/11/2024.  This finding is indeterminate but statistically benign.  There is a non-specific varicosity or collateral vasculature along the anterior/medial margin of the spleen.  Clinical correlation is recommended to assess for portal hypertension.  There is a 17 mm peripherally calcified nodule at the anterior margin of the pancreatic tail, not significantly changed.  There is mild gastric wall thickening which may relate to underdistention or gastritis.  Consider endoscopy for further assessment.  The proximal gastric wall appears hypervascular.  AVN of the right femoral head    LOCATION:  [id number]   Dictated by (CST): Stromberg, LeRoy, MD on 3/13/2024 at 9:57 AM     Finalized by (CST): Stromberg, LeRoy, MD on 3/13/2024 at 10:07 AM            MDM     Vitals:    03/13/24 0700 03/13/24 0745 03/13/24 0945   BP: (!) 144/91 156/80 119/71   Pulse: 91 85 75   Resp: 22 17 14   Temp: 97.9 °F (36.6 °C)     TempSrc: Oral     SpO2: 99% 100% 99%       Abdominal pain and vomiting.  I reviewed the CT yesterday which was unremarkable, possible early diverticulitis which may now be apparent on CT imaging versus development of abscess, less likely abdominal surgical pathology.  Hyperemesis cannabis is also on the differential.  Will give fluids, droperidol and reassess    Patient having a dystonic reaction to droperidol, given Benadryl and lorazepam with improvement.    ED Course as of 03/13/24 1019  ------------------------------------------------------------  Time: 03/13 0954  Comment: My interpretation of CT without free air.  Labs are unremarkable  ------------------------------------------------------------  Time: 03/13 1010  Comment: CT showing trace fat stranding around the sigmoid colon which may be demonstrating patient's diverticulitis.  Other chronic changes noted.  No  acute surgical pathology       Discussed all findings.  Finish course of antibiotics, continue pain medicines as needed.  Discussed cannabis abstinence  Patient is feeling well to be discharged.  Vitals remain stable.  Ambulating without difficulty.  Given written and verbal instructions regarding this condition and strict return precautions.   Will follow up in clinic.   Patient verbalizes understanding of instructions and follow up plan, as well as indications to return to the emergency department.        Disposition and Plan     Clinical Impression:  1. Acute diverticulitis        Disposition:  Discharge    Follow-up:  Thang Mitchell MD  [address] [place] [id number] [phone]    Follow up        Medications Prescribed:  Current Discharge Medication List

## 2024-03-13 NOTE — ED QUICK NOTES
Pt father reported pt was having convulsions. Upon entering the room, pt found to have spastic jerking movements but pt was able to be redirected and remained neurologically intact. MD Carrillo as in room with another patient, so this writer requested the help of MD Henson. MD Henson ordered benadryl for pt and 1mg of ativan if pt continued to have jerking movements.

## 2024-03-13 NOTE — ED QUICK NOTES
Pt found to be hypoxic while sleeping around 86%. Pt is in no distress. Pt placed on 1L NC for comfort.

## 2024-03-13 NOTE — ED QUICK NOTES
Pt and pt father verbalized understanding regarding discharge. Pt and father denied questions at time of discharge.

## 2024-03-19 ENCOUNTER — OFFICE VISIT (OUTPATIENT)
Dept: INTERNAL MEDICINE CLINIC | Facility: CLINIC | Age: 29
End: 2024-03-19
Payer: COMMERCIAL

## 2024-03-19 VITALS
RESPIRATION RATE: 14 BRPM | BODY MASS INDEX: 32.44 KG/M2 | WEIGHT: 219 LBS | OXYGEN SATURATION: 98 % | HEART RATE: 108 BPM | DIASTOLIC BLOOD PRESSURE: 78 MMHG | SYSTOLIC BLOOD PRESSURE: 120 MMHG | HEIGHT: 69 IN | TEMPERATURE: 98 F

## 2024-03-19 DIAGNOSIS — K57.92 DIVERTICULITIS: Primary | ICD-10-CM

## 2024-03-19 DIAGNOSIS — Z09 HOSPITAL DISCHARGE FOLLOW-UP: ICD-10-CM

## 2024-03-19 PROCEDURE — 3074F SYST BP LT 130 MM HG: CPT | Performed by: INTERNAL MEDICINE

## 2024-03-19 PROCEDURE — 3078F DIAST BP <80 MM HG: CPT | Performed by: INTERNAL MEDICINE

## 2024-03-19 PROCEDURE — 3008F BODY MASS INDEX DOCD: CPT | Performed by: INTERNAL MEDICINE

## 2024-03-19 PROCEDURE — 99214 OFFICE O/P EST MOD 30 MIN: CPT | Performed by: INTERNAL MEDICINE

## 2024-03-19 NOTE — PROGRESS NOTES
Subjective:   Patient ID: Douglas Dave is a 28 year old male.    HPI  ER follow up  28 year old male presentsed with left lower quadrant cramping abdominal pain associated with numerous episodes of emesis.  Patient has had prior diverticulitis and reports this felt similar,  started on antibiotics and prescribed Norco for pain.  Feels better know  Has discussed with GI. Started pt on fiber  History/Other:   Review of Systems   All other systems reviewed and are negative.    Current Outpatient Medications   Medication Sig Dispense Refill    fluocinonide 0.05 % External Cream Apply 1 Application topically 2 (two) times daily. 30 g 0    levoFLOXacin 750 MG Oral Tab Take 1 tablet (750 mg total) by mouth daily for 10 days. 10 tablet 0    metRONIDAZOLE 500 MG Oral Tab Take 1 tablet (500 mg total) by mouth 3 (three) times daily for 10 days. 30 tablet 0    HYDROcodone-acetaminophen (NORCO)  MG Oral Tab Take 1 tablet by mouth every 8 (eight) hours as needed for Pain. 6 tablet 0    olopatadine (PATADAY) 0.1 % Ophthalmic Solution 2 (two) times daily.      traMADol 50 MG Oral Tab Take 1 tablet (50 mg total) by mouth every 6 (six) hours as needed for Pain. 30 tablet 1    albuterol (2.5 MG/3ML) 0.083% Inhalation Nebu Soln Take 3 mL (2.5 mg total) by nebulization every 4 (four) hours as needed for Wheezing or Shortness of Breath. 540 mL 0    EPINEPHrine (AUVI-Q) 0.3 MG/0.3ML Injection Solution Auto-injector Inject 0.3 mL (1 each total) as directed as needed. 1 each 0    Multiple Vitamin Oral Tab Take 1 tablet by mouth daily.      Vitamin D3 25 MCG (1000 UT) Oral Tab Take 1 tablet (1,000 Units total) by mouth daily.      Ascorbic Acid (VITAMIN C) 1000 MG Oral Tab Take 1 tablet (1,000 mg total) by mouth daily.      MONTELUKAST SODIUM 10 MG Oral Tab TAKE 1 TABLET BY MOUTH EVERY DAY AT BEDTIME 90 tablet 1    cetirizine 10 MG Oral Tab Take 1 tablet (10 mg total) by mouth daily.      Albuterol Sulfate  (90 Base)  MCG/ACT Inhalation Aero Soln Inhale 1 puff into the lungs every 6 (six) hours as needed for Wheezing.      lamoTRIgine 25 MG Oral Tab Take 2 tablets (50 mg total) by mouth 2 (two) times daily.      omeprazole 20 MG Oral Capsule Delayed Release Take 1 capsule (20 mg total) by mouth every morning before breakfast.      escitalopram 10 MG Oral Tab Take 1 tablet (10 mg total) by mouth once daily.  2     Allergies:  Allergies   Allergen Reactions    Droperidol OTHER (SEE COMMENTS)     Convulsions    Augmentin [Amoxicillin-Pot Clavulanate] DIARRHEA and NAUSEA AND VOMITING    Reglan [Metoclopramide] OTHER (SEE COMMENTS)     Dystonia     Mold ITCHING     Has EPI pen if needed    Morphine RASH       Objective:   Physical Exam  Vitals and nursing note reviewed.   Constitutional:       Appearance: Normal appearance.   Cardiovascular:      Rate and Rhythm: Normal rate and regular rhythm.      Heart sounds: Normal heart sounds.   Pulmonary:      Effort: Pulmonary effort is normal.      Breath sounds: Normal breath sounds.   Abdominal:      General: Abdomen is flat. Bowel sounds are normal.      Palpations: Abdomen is soft.      Tenderness: There is no abdominal tenderness. There is no guarding.   Neurological:      Mental Status: He is alert.         Assessment & Plan:   1. Diverticulitis    2. Hospital discharge follow-up      - finish abx. Cont fiber indiet  No orders of the defined types were placed in this encounter.      Meds This Visit:  Requested Prescriptions     Signed Prescriptions Disp Refills    fluocinonide 0.05 % External Cream 30 g 0     Sig: Apply 1 Application topically 2 (two) times daily.       Imaging & Referrals:  None

## 2024-05-05 ENCOUNTER — HOSPITAL ENCOUNTER (OUTPATIENT)
Age: 29
Discharge: HOME | End: 2024-05-05
Attending: EMERGENCY MEDICINE
Payer: COMMERCIAL

## 2024-05-05 VITALS
TEMPERATURE: 98 F | HEIGHT: 67 IN | OXYGEN SATURATION: 100 % | DIASTOLIC BLOOD PRESSURE: 90 MMHG | SYSTOLIC BLOOD PRESSURE: 152 MMHG | WEIGHT: 220 LBS | BODY MASS INDEX: 34.53 KG/M2 | HEART RATE: 89 BPM | RESPIRATION RATE: 18 BRPM

## 2024-05-05 DIAGNOSIS — J02.9 ACUTE VIRAL PHARYNGITIS: Primary | ICD-10-CM

## 2024-05-05 LAB — S PYO AG THROAT QL IA.RAPID: NEGATIVE

## 2024-05-05 PROCEDURE — 87651 STREP A DNA AMP PROBE: CPT | Performed by: EMERGENCY MEDICINE

## 2024-05-05 PROCEDURE — 99214 OFFICE O/P EST MOD 30 MIN: CPT

## 2024-05-05 PROCEDURE — 99213 OFFICE O/P EST LOW 20 MIN: CPT

## 2024-05-05 RX ORDER — PREDNISONE 20 MG/1
40 TABLET ORAL DAILY
Qty: 6 TABLET | Refills: 0 | Status: SHIPPED | OUTPATIENT
Start: 2024-05-05 | End: 2024-05-08

## 2024-05-05 NOTE — ED PROVIDER NOTES
Patient Seen in: [facility]      History     Chief Complaint   Patient presents with    Sore Throat     Stated Complaint: ear ache, sore throat, cough    Subjective:   28-year-old male presents with sore throat x 5 days.  Limited left ear pain as well.  Throat is more sore on the left side than the right side.  No difficulty with swallowing or secretions.  No cough.  No fevers.  Non-smoker nondiabetic.  No cough chest pain shortness of breath abdominal pain vomiting diarrhea            Objective:   Past Medical History:    Allergic rhinitis    Anxiety    Asthma (HCC)    Bipolar affective ([facility])    Displaced fracture of base of fifth metacarpal bone of right hand with delayed healing    Esophageal reflux    Extrinsic asthma, unspecified    Hepatitis, alcoholic, acute (HCC)    History of depression    PONV (postoperative nausea and vomiting)    Visual impairment    glasses/contacts              Past Surgical History:   Procedure Laterality Date    Adenoidectomy  2010    Fracture surgery      Other surgical history  08/18/2017    balloon sinuplasty    Tonsillectomy  2010    Upper gi endoscopy - referral                  Social History     Socioeconomic History    Marital status: Single   Tobacco Use    Smoking status: Never     Passive exposure: Never    Smokeless tobacco: Never   Vaping Use    Vaping status: Former    Substances: Nicotine, THC    Devices: Pre-filled pod   Substance and Sexual Activity    Alcohol use: Not Currently     Alcohol/week: 0.0 standard drinks of alcohol    Drug use: Yes     Types: Cannabis     Comment: medically perscribed marijuana for depression treatment   Other Topics Concern    Caffeine Concern Yes     Comment: occ    Exercise Yes     Comment: 3 times per week     Seat Belt Yes     Social Determinants of Health     Financial Resource Strain: Low Risk  (9/29/2023)    Financial Resource Strain     Difficulty of Paying Living Expenses: Not hard at all     Med Affordability:  No   Transportation Needs: No Transportation Needs (9/29/2023)    Transportation Needs     Lack of Transportation: No              Review of Systems   Constitutional:  Negative for fever.   HENT:  Positive for sore throat. Negative for trouble swallowing and voice change.    Respiratory:  Negative for cough and shortness of breath.    Gastrointestinal:  Negative for abdominal pain.       Positive for stated complaint: ear ache, sore throat, cough  Other systems are as noted in HPI.  Constitutional and vital signs reviewed.      All other systems reviewed and negative except as noted above.    Physical Exam     ED Triage Vitals [05/05/24 1257]   /90   Pulse 89   Resp 18   Temp 97.6 °F (36.4 °C)   Temp src Temporal   SpO2 100 %   O2 Device None (Room air)       Current:/90   Pulse 89   Temp 97.6 °F (36.4 °C) (Temporal)   Resp 18   Ht 170.2 cm (5' 7\")   Wt 99.8 kg   SpO2 100%   BMI 34.46 kg/m²         Physical Exam  Vitals and nursing note reviewed.   Constitutional:       Appearance: He is not toxic-appearing.   HENT:      Head: Normocephalic.      Right Ear: Tympanic membrane normal.      Left Ear: Tympanic membrane normal.      Mouth/Throat:      Pharynx: Oropharynx is clear. Posterior oropharyngeal erythema present. No oropharyngeal exudate.      Tonsils: No tonsillar exudate or tonsillar abscesses.   Cardiovascular:      Rate and Rhythm: Normal rate and regular rhythm.   Pulmonary:      Effort: Pulmonary effort is normal.   Musculoskeletal:      Cervical back: Normal range of motion and neck supple.   Skin:     General: Skin is warm and dry.   Neurological:      General: No focal deficit present.      Mental Status: He is alert.   Psychiatric:         Mood and Affect: Mood normal.         Behavior: Behavior normal.               ED Course     Labs Reviewed   RAPID STREP A - Normal                      MDM     28-year-old male with viral URI with pharyngitis.  Posterior pharynx is clear with mild  erythema.  No neck rigidity.  He is very well-appearing and nontoxic.  Lungs are clear, pulse are equal.  Resting no distress.  Discussed febrile as bacterial illness.  No indication for antibiotics, discharge home, return precaution provided, shared decision making utilized                                   Medical Decision Making      Disposition and Plan     Clinical Impression:  1. Acute viral pharyngitis         Disposition:  Discharge  5/5/2024  1:15 pm    Follow-up:  Thang Mitchell MD  [address] [place] [id number] [phone]                Medications Prescribed:  Current Discharge Medication List        START taking these medications    Details   predniSONE 20 MG Oral Tab Take 2 tablets (40 mg total) by mouth daily for 3 days.  Qty: 6 tablet, Refills: 0

## 2024-05-05 NOTE — ED INITIAL ASSESSMENT (HPI)
Pt states since last Tues, pt c/o lt sided sore throat, left ear pain, cough and congestion.    Denies fever.    Denies sob or chest pain

## 2024-05-06 ENCOUNTER — OFFICE VISIT (OUTPATIENT)
Dept: INTERNAL MEDICINE CLINIC | Facility: CLINIC | Age: 29
End: 2024-05-06
Payer: COMMERCIAL

## 2024-05-06 VITALS
WEIGHT: 213 LBS | HEIGHT: 67 IN | RESPIRATION RATE: 18 BRPM | TEMPERATURE: 98 F | OXYGEN SATURATION: 98 % | BODY MASS INDEX: 33.43 KG/M2 | HEART RATE: 82 BPM | DIASTOLIC BLOOD PRESSURE: 80 MMHG | SYSTOLIC BLOOD PRESSURE: 135 MMHG

## 2024-05-06 DIAGNOSIS — K11.21 ACUTE PAROTITIS: Primary | ICD-10-CM

## 2024-05-06 DIAGNOSIS — J02.9 PHARYNGITIS, UNSPECIFIED ETIOLOGY: ICD-10-CM

## 2024-05-06 PROCEDURE — 3008F BODY MASS INDEX DOCD: CPT

## 2024-05-06 PROCEDURE — 99214 OFFICE O/P EST MOD 30 MIN: CPT

## 2024-05-06 PROCEDURE — 3075F SYST BP GE 130 - 139MM HG: CPT

## 2024-05-06 PROCEDURE — 3079F DIAST BP 80-89 MM HG: CPT

## 2024-05-06 RX ORDER — PREDNISONE 20 MG/1
40 TABLET ORAL DAILY
Qty: 8 TABLET | Refills: 0 | Status: SHIPPED | OUTPATIENT
Start: 2024-05-06 | End: 2024-05-10

## 2024-05-06 RX ORDER — METRONIDAZOLE 500 MG/1
500 TABLET ORAL 3 TIMES DAILY
Qty: 30 TABLET | Refills: 0 | Status: SHIPPED | OUTPATIENT
Start: 2024-05-06 | End: 2024-05-16

## 2024-05-06 RX ORDER — CEFUROXIME AXETIL 500 MG/1
500 TABLET ORAL DAILY
Qty: 10 TABLET | Refills: 0 | Status: SHIPPED | OUTPATIENT
Start: 2024-05-06 | End: 2024-05-16

## 2024-05-06 NOTE — PROGRESS NOTES
Douglas Dave is a 28 year old male.  HPI:   HPI   Pt presents today with c/o left throat pain and cheek pain x 5 days. Able to swallow food/liquid but painful. Experiencing non productive cough.   Denies fever, body aches, chills.   Pt presented to  yesterday.  Rapid strep was negative. Discharged with prednisone  x 3 days.  Current Outpatient Medications   Medication Sig Dispense Refill    predniSONE 20 MG Oral Tab Take 2 tablets (40 mg total) by mouth daily for 4 days. 8 tablet 0    cefuroxime 500 MG Oral Tab Take 1 tablet (500 mg total) by mouth daily for 10 days. 10 tablet 0    metRONIDAZOLE 500 MG Oral Tab Take 1 tablet (500 mg total) by mouth 3 (three) times daily for 10 days. 30 tablet 0    predniSONE 20 MG Oral Tab Take 2 tablets (40 mg total) by mouth daily for 3 days. 6 tablet 0    Multiple Vitamin Oral Tab Take 1 tablet by mouth daily.      Vitamin D3 25 MCG (1000 UT) Oral Tab Take 1 tablet (1,000 Units total) by mouth daily.      Ascorbic Acid (VITAMIN C) 1000 MG Oral Tab Take 1 tablet (1,000 mg total) by mouth daily.      MONTELUKAST SODIUM 10 MG Oral Tab TAKE 1 TABLET BY MOUTH EVERY DAY AT BEDTIME 90 tablet 1    cetirizine 10 MG Oral Tab Take 1 tablet (10 mg total) by mouth daily.      Albuterol Sulfate  (90 Base) MCG/ACT Inhalation Aero Soln Inhale 1 puff into the lungs every 6 (six) hours as needed for Wheezing.      lamoTRIgine 25 MG Oral Tab Take 2 tablets (50 mg total) by mouth 2 (two) times daily.      omeprazole 20 MG Oral Capsule Delayed Release Take 1 capsule (20 mg total) by mouth every morning before breakfast.      escitalopram 10 MG Oral Tab Take 1 tablet (10 mg total) by mouth once daily.  2    fluocinonide 0.05 % External Cream Apply 1 Application topically 2 (two) times daily. (Patient not taking: Reported on 5/6/2024) 30 g 0    HYDROcodone-acetaminophen (NORCO)  MG Oral Tab Take 1 tablet by mouth every 8 (eight) hours as needed for Pain. (Patient not taking:  Reported on 5/6/2024) 6 tablet 0    olopatadine (PATADAY) 0.1 % Ophthalmic Solution 2 (two) times daily. (Patient not taking: Reported on 5/6/2024)      traMADol 50 MG Oral Tab Take 1 tablet (50 mg total) by mouth every 6 (six) hours as needed for Pain. (Patient not taking: Reported on 5/6/2024) 30 tablet 1    albuterol (2.5 MG/3ML) 0.083% Inhalation Nebu Soln Take 3 mL (2.5 mg total) by nebulization every 4 (four) hours as needed for Wheezing or Shortness of Breath. (Patient not taking: Reported on 5/6/2024) 540 mL 0    EPINEPHrine (AUVI-Q) 0.3 MG/0.3ML Injection Solution Auto-injector Inject 0.3 mL (1 each total) as directed as needed. (Patient not taking: Reported on 5/6/2024) 1 each 0      Past Medical History:    Allergic rhinitis    Anxiety    Asthma ([facility])    Bipolar affective ([facility])    Displaced fracture of base of fifth metacarpal bone of right hand with delayed healing    Esophageal reflux    Extrinsic asthma, unspecified    Hepatitis, alcoholic, acute (HCC)    History of depression    PONV (postoperative nausea and vomiting)    Visual impairment    glasses/contacts      Social History:  Social History     Socioeconomic History    Marital status: Single   Tobacco Use    Smoking status: Never     Passive exposure: Never    Smokeless tobacco: Never   Vaping Use    Vaping status: Former    Substances: Nicotine, THC    Devices: Pre-filled pod   Substance and Sexual Activity    Alcohol use: Not Currently     Alcohol/week: 0.0 standard drinks of alcohol    Drug use: Yes     Types: Cannabis     Comment: medically perscribed marijuana for depression treatment   Other Topics Concern    Caffeine Concern Yes     Comment: occ    Exercise Yes     Comment: 3 times per week     Seat Belt Yes     Social Determinants of Health     Financial Resource Strain: Low Risk  (9/29/2023)    Financial Resource Strain     Difficulty of Paying Living Expenses: Not hard at all     Med Affordability: No   Transportation Needs: No  Transportation Needs (9/29/2023)    Transportation Needs     Lack of Transportation: No        REVIEW OF SYSTEMS:   Review of Systems   Constitutional:  Negative for chills, diaphoresis, fatigue and fever.   HENT:  Positive for ear pain, postnasal drip, sore throat and trouble swallowing. Negative for congestion, sinus pressure, sinus pain and voice change.    Eyes: Negative.    Respiratory:  Positive for cough. Negative for shortness of breath, wheezing and stridor.    Cardiovascular: Negative.    Musculoskeletal: Negative.    Skin: Negative.    Neurological: Negative.    Psychiatric/Behavioral: Negative.           EXAM:   /80   Pulse 82   Temp 97.7 °F (36.5 °C) (Temporal)   Resp 18   Ht 5' 7\" (1.702 m)   Wt 213 lb (96.6 kg)   SpO2 98%   BMI 33.36 kg/m²   Physical Exam  Vitals and nursing note reviewed.   Constitutional:       Appearance: Normal appearance. He is normal weight.   HENT:      Right Ear: No middle ear effusion. Tympanic membrane is not injected or erythematous.      Left Ear:  No middle ear effusion. Tympanic membrane is not injected or erythematous.      Nose: Mucosal edema present.      Right Turbinates: Swollen.      Left Turbinates: Swollen.      Right Sinus: No maxillary sinus tenderness or frontal sinus tenderness.      Left Sinus: No maxillary sinus tenderness or frontal sinus tenderness.      Mouth/Throat:      Lips: Pink.      Mouth: Mucous membranes are moist.      Palate: No mass.      Pharynx: Uvula midline. Pharyngeal swelling and posterior oropharyngeal erythema present. No oropharyngeal exudate or uvula swelling.      Comments: Hx of tonsillectomy Left pharynx with erythema and swelling.   + Parotid gland swelling, painful to touch.   Cardiovascular:      Rate and Rhythm: Normal rate and regular rhythm.      Pulses: Normal pulses.      Heart sounds: Normal heart sounds.   Pulmonary:      Effort: Pulmonary effort is normal. No respiratory distress.      Breath sounds: Normal  breath sounds. No stridor. No wheezing, rhonchi or rales.   Chest:      Chest wall: No tenderness.   Skin:     General: Skin is warm and dry.      Capillary Refill: Capillary refill takes less than 2 seconds.   Neurological:      General: No focal deficit present.      Mental Status: He is alert and oriented to person, place, and time. Mental status is at baseline.   Psychiatric:         Mood and Affect: Mood normal.         Behavior: Behavior normal.         Thought Content: Thought content normal.          ASSESSMENT AND PLAN:   Diagnoses and all orders for this visit:    Acute parotitis  Pharyngitis, unspecified etiology  -     predniSONE 20 MG Oral Tab; Take 2 tablets (40 mg total) by mouth daily for 4 days.  -     cefuroxime 500 MG Oral Tab; Take 1 tablet (500 mg total) by mouth daily for 10 days.  -     metRONIDAZOLE 500 MG Oral Tab; Take 1 tablet (500 mg total) by mouth 3 (three) times daily for 10 days.   -pt is aware if swelling increasing, unable to swallow to proceed to ED.    -f/u in a week,  Requested Prescriptions     Signed Prescriptions Disp Refills    predniSONE 20 MG Oral Tab 8 tablet 0     Sig: Take 2 tablets (40 mg total) by mouth daily for 4 days.    cefuroxime 500 MG Oral Tab 10 tablet 0     Sig: Take 1 tablet (500 mg total) by mouth daily for 10 days.    metRONIDAZOLE 500 MG Oral Tab 30 tablet 0     Sig: Take 1 tablet (500 mg total) by mouth 3 (three) times daily for 10 days.         The patient indicates understanding of these issues and agrees to the plan.  The patient is asked to return in 1 week.

## 2024-05-06 NOTE — PROGRESS NOTES
Douglas Dave is a 28 year old male.  HPI:   HPI   Pt presents today with c/o left throat pain and cheek pain x 5 days. Able to swallow food/liquid but painful.  Denies fever, body aches, chills.   Pt presented to  yesterday.  Rapid strep was negative. Discharged with prednisone  x 3 days.  Current Outpatient Medications   Medication Sig Dispense Refill    predniSONE 20 MG Oral Tab Take 2 tablets (40 mg total) by mouth daily for 3 days. 6 tablet 0    fluocinonide 0.05 % External Cream Apply 1 Application topically 2 (two) times daily. 30 g 0    HYDROcodone-acetaminophen (NORCO)  MG Oral Tab Take 1 tablet by mouth every 8 (eight) hours as needed for Pain. 6 tablet 0    olopatadine (PATADAY) 0.1 % Ophthalmic Solution 2 (two) times daily.      traMADol 50 MG Oral Tab Take 1 tablet (50 mg total) by mouth every 6 (six) hours as needed for Pain. 30 tablet 1    albuterol (2.5 MG/3ML) 0.083% Inhalation Nebu Soln Take 3 mL (2.5 mg total) by nebulization every 4 (four) hours as needed for Wheezing or Shortness of Breath. 540 mL 0    EPINEPHrine (AUVI-Q) 0.3 MG/0.3ML Injection Solution Auto-injector Inject 0.3 mL (1 each total) as directed as needed. 1 each 0    Multiple Vitamin Oral Tab Take 1 tablet by mouth daily.      Vitamin D3 25 MCG (1000 UT) Oral Tab Take 1 tablet (1,000 Units total) by mouth daily.      Ascorbic Acid (VITAMIN C) 1000 MG Oral Tab Take 1 tablet (1,000 mg total) by mouth daily.      MONTELUKAST SODIUM 10 MG Oral Tab TAKE 1 TABLET BY MOUTH EVERY DAY AT BEDTIME 90 tablet 1    cetirizine 10 MG Oral Tab Take 1 tablet (10 mg total) by mouth daily.      Albuterol Sulfate  (90 Base) MCG/ACT Inhalation Aero Soln Inhale 1 puff into the lungs every 6 (six) hours as needed for Wheezing.      lamoTRIgine 25 MG Oral Tab Take 2 tablets (50 mg total) by mouth 2 (two) times daily.      omeprazole 20 MG Oral Capsule Delayed Release Take 1 capsule (20 mg total) by mouth every morning before  breakfast.      escitalopram 10 MG Oral Tab Take 1 tablet (10 mg total) by mouth once daily.  2      Past Medical History:    Allergic rhinitis    Anxiety    Asthma (HCC)    Bipolar affective (HCC)    Displaced fracture of base of fifth metacarpal bone of right hand with delayed healing    Esophageal reflux    Extrinsic asthma, unspecified    Hepatitis, alcoholic, acute (HCC)    History of depression    PONV (postoperative nausea and vomiting)    Visual impairment    glasses/contacts      Social History:  Social History     Socioeconomic History    Marital status: Single   Tobacco Use    Smoking status: Never     Passive exposure: Never    Smokeless tobacco: Never   Vaping Use    Vaping status: Former    Substances: Nicotine, THC    Devices: Pre-filled pod   Substance and Sexual Activity    Alcohol use: Not Currently     Alcohol/week: 0.0 standard drinks of alcohol    Drug use: Yes     Types: Cannabis     Comment: medically perscribed marijuana for depression treatment   Other Topics Concern    Caffeine Concern Yes     Comment: occ    Exercise Yes     Comment: 3 times per week     Seat Belt Yes     Social Determinants of Health     Financial Resource Strain: Low Risk  (9/29/2023)    Financial Resource Strain     Difficulty of Paying Living Expenses: Not hard at all     Med Affordability: No   Transportation Needs: No Transportation Needs (9/29/2023)    Transportation Needs     Lack of Transportation: No        REVIEW OF SYSTEMS:   GENERAL HEALTH: feels well otherwise. Denies fever, chills, unintentional weight change  SKIN: denies any unusual skin lesions or rashes  RESPIRATORY: denies shortness of breath with exertion, denies cough or wheezing  CARDIOVASCULAR: denies chest pain or palpitations, denies leg swelling  GI: denies abdominal pain and denies heartburn. Denies nausea, vomiting, diarrhea, constipation  NEURO: denies headaches, dizziness, weakness, syncope  PSYCH: denies anxiety, depression,  insomnia    EXAM:   There were no vitals taken for this visit.  GENERAL: well developed, well nourished,in no apparent distress  SKIN: no rashes,no suspicious lesions, warm and dry  HEENT: atraumatic, normocephalic,ears and throat are clear  NECK: supple,no adenopathy, no thyromegaly  LUNGS: clear to auscultation b/l no W/R/R  CARDIO: RRR without murmur  GI: good BS's,no masses, HSM, distension or tenderness  EXTREMITIES: no cyanosis, clubbing or edema  MUSCULOSKELETAL: FROM, no joint swelling or bony tenderness  NEURO: a/ox3, no focal deficits  PSYCH: mood and affect normal    ASSESSMENT AND PLAN:   No diagnosis found.    ***  Requested Prescriptions      No prescriptions requested or ordered in this encounter         The patient indicates understanding of these issues and agrees to the plan.  The patient is asked to return in ***.

## 2024-09-06 NOTE — PROGRESS NOTES
Douglas Dave is a 29 year old male.  HPI:   This visit is conducted using Telemedicine with live, interactive video and audio.     Patient consents to video visit today to discuss PND, non productive cough, sore throat, nasal congestion, ear congestion, chills, fatigue 6 days.  Denies fever, body aches.  Has been taking Tylenol, mucinex, sudafed with minimum relief.   Covid test negative yesterday. Hx of frequent sinus infections.  Current Outpatient Medications   Medication Sig Dispense Refill    doxycycline 100 MG Oral Cap Take 1 capsule (100 mg total) by mouth 2 (two) times daily. 14 capsule 0    predniSONE 20 MG Oral Tab Take 2 tablets (40 mg total) by mouth daily for 7 days. 14 tablet 0    fluocinonide 0.05 % External Cream Apply 1 Application topically 2 (two) times daily. (Patient not taking: Reported on 5/6/2024) 30 g 0    HYDROcodone-acetaminophen (NORCO)  MG Oral Tab Take 1 tablet by mouth every 8 (eight) hours as needed for Pain. (Patient not taking: Reported on 5/6/2024) 6 tablet 0    olopatadine (PATADAY) 0.1 % Ophthalmic Solution 2 (two) times daily. (Patient not taking: Reported on 5/6/2024)      traMADol 50 MG Oral Tab Take 1 tablet (50 mg total) by mouth every 6 (six) hours as needed for Pain. (Patient not taking: Reported on 5/6/2024) 30 tablet 1    albuterol (2.5 MG/3ML) 0.083% Inhalation Nebu Soln Take 3 mL (2.5 mg total) by nebulization every 4 (four) hours as needed for Wheezing or Shortness of Breath. (Patient not taking: Reported on 5/6/2024) 540 mL 0    EPINEPHrine (AUVI-Q) 0.3 MG/0.3ML Injection Solution Auto-injector Inject 0.3 mL (1 each total) as directed as needed. (Patient not taking: Reported on 5/6/2024) 1 each 0    Multiple Vitamin Oral Tab Take 1 tablet by mouth daily.      Vitamin D3 25 MCG (1000 UT) Oral Tab Take 1 tablet (1,000 Units total) by mouth daily.      Ascorbic Acid (VITAMIN C) 1000 MG Oral Tab Take 1 tablet (1,000 mg total) by mouth daily.       MONTELUKAST SODIUM 10 MG Oral Tab TAKE 1 TABLET BY MOUTH EVERY DAY AT BEDTIME 90 tablet 1    cetirizine 10 MG Oral Tab Take 1 tablet (10 mg total) by mouth daily.      Albuterol Sulfate  (90 Base) MCG/ACT Inhalation Aero Soln Inhale 1 puff into the lungs every 6 (six) hours as needed for Wheezing.      lamoTRIgine 25 MG Oral Tab Take 2 tablets (50 mg total) by mouth 2 (two) times daily.      omeprazole 20 MG Oral Capsule Delayed Release Take 1 capsule (20 mg total) by mouth every morning before breakfast.      escitalopram 10 MG Oral Tab Take 1 tablet (10 mg total) by mouth once daily.  2      Past Medical History:    Allergic rhinitis    Anxiety    Asthma (HCC)    Bipolar affective (HCC)    Displaced fracture of base of fifth metacarpal bone of right hand with delayed healing    Esophageal reflux    Extrinsic asthma, unspecified    Hepatitis, alcoholic, acute (HCC)    History of depression    PONV (postoperative nausea and vomiting)    Visual impairment    glasses/contacts      Social History:  Social History     Socioeconomic History    Marital status: Single   Tobacco Use    Smoking status: Never     Passive exposure: Never    Smokeless tobacco: Never   Vaping Use    Vaping status: Former    Substances: Nicotine, THC    Devices: Pre-filled pod   Substance and Sexual Activity    Alcohol use: Not Currently     Alcohol/week: 0.0 standard drinks of alcohol    Drug use: Yes     Types: Cannabis     Comment: medically perscribed marijuana for depression treatment   Other Topics Concern    Caffeine Concern Yes     Comment: occ    Exercise Yes     Comment: 3 times per week     Seat Belt Yes     Social Determinants of Health     Financial Resource Strain: Low Risk  (9/29/2023)    Financial Resource Strain     Difficulty of Paying Living Expenses: Not hard at all     Med Affordability: No   Transportation Needs: No Transportation Needs (9/29/2023)    Transportation Needs     Lack of Transportation: No        REVIEW  OF SYSTEMS:   GENERAL HEALTH: feels well otherwise. Denies fever, chills, unintentional weight change  SKIN: denies any unusual skin lesions or rashes  RESPIRATORY: denies hemoptysis, shortness of breath with exertion, wheezing, +cough  CARDIOVASCULAR: denies chest pain or palpitations, denies leg swelling  GI: denies abdominal pain and denies heartburn. Denies nausea, vomiting, diarrhea, constipation  NEURO: denies headaches, dizziness, weakness, syncope    EXAM:   No vitals for video visit  Physical Exam  - well appearing via video visit  - no visible rash or diaphoresis  - no respiratory distress or cough observed  - able to speak in full sentences  - alert and oriented        ASSESSMENT AND PLAN:     Encounter Diagnosis   Name Primary?    Acute non-recurrent frontal sinusitis Yes    -complete steroid and antibiotic therapy  Requested Prescriptions     Signed Prescriptions Disp Refills    doxycycline 100 MG Oral Cap 14 capsule 0     Sig: Take 1 capsule (100 mg total) by mouth 2 (two) times daily.    predniSONE 20 MG Oral Tab 14 tablet 0     Sig: Take 2 tablets (40 mg total) by mouth daily for 7 days.         The patient indicates understanding of these issues and agrees to the plan.  Douglas Dave understands phone evaluation is not a substitute for face-to-face examination or emergency care. Patient advised to go to ER or call 911 for worsening symptoms or acute distress.   Follow up if symptoms persist or worsen.

## 2024-09-07 ENCOUNTER — TELEMEDICINE (OUTPATIENT)
Dept: INTERNAL MEDICINE CLINIC | Facility: CLINIC | Age: 29
End: 2024-09-07
Payer: COMMERCIAL

## 2024-09-07 DIAGNOSIS — J01.10 ACUTE NON-RECURRENT FRONTAL SINUSITIS: Primary | ICD-10-CM

## 2024-09-07 RX ORDER — PREDNISONE 20 MG/1
40 TABLET ORAL DAILY
Qty: 14 TABLET | Refills: 0 | Status: SHIPPED | OUTPATIENT
Start: 2024-09-07 | End: 2024-09-14

## 2024-09-07 RX ORDER — DOXYCYCLINE 100 MG/1
100 CAPSULE ORAL 2 TIMES DAILY
Qty: 14 CAPSULE | Refills: 0 | Status: SHIPPED | OUTPATIENT
Start: 2024-09-07

## 2024-09-29 NOTE — PROGRESS NOTES
HPI:   Patient presents with symptoms of productive cough (clear mucus), sore throat, sinus pressure/pain, rhinorrhea (green mucus), low grade fever, fatigue 5 days.   Current treatment includes Flonase, singulair, Zyrtec with no symptom resolution.   Denies body aches  Covid test negative yesterday.  Hx of chronic sinusitis. Follows with ENT.   Get's allergy shots through ENT every 3 wks.   Current Outpatient Medications   Medication Sig Dispense Refill    fluticasone propionate 50 MCG/ACT Nasal Suspension 2 sprays by Each Nare route daily.      predniSONE 20 MG Oral Tab Take 2 tablets (40 mg total) by mouth daily for 7 days. 14 tablet 0    doxycycline 100 MG Oral Cap Take 1 capsule (100 mg total) by mouth 2 (two) times daily. 14 capsule 0    fluocinonide 0.05 % External Cream Apply 1 Application topically 2 (two) times daily. 30 g 0    olopatadine (PATADAY) 0.1 % Ophthalmic Solution 2 (two) times daily.      albuterol (2.5 MG/3ML) 0.083% Inhalation Nebu Soln Take 3 mL (2.5 mg total) by nebulization every 4 (four) hours as needed for Wheezing or Shortness of Breath. 540 mL 0    EPINEPHrine (AUVI-Q) 0.3 MG/0.3ML Injection Solution Auto-injector Inject 0.3 mL (1 each total) as directed as needed. 1 each 0    Multiple Vitamin Oral Tab Take 1 tablet by mouth daily.      Ascorbic Acid (VITAMIN C) 1000 MG Oral Tab Take 1 tablet (1,000 mg total) by mouth daily.      MONTELUKAST SODIUM 10 MG Oral Tab TAKE 1 TABLET BY MOUTH EVERY DAY AT BEDTIME 90 tablet 1    cetirizine 10 MG Oral Tab Take 1 tablet (10 mg total) by mouth daily.      Albuterol Sulfate  (90 Base) MCG/ACT Inhalation Aero Soln Inhale 1 puff into the lungs every 6 (six) hours as needed for Wheezing.      lamoTRIgine 25 MG Oral Tab Take 2 tablets (50 mg total) by mouth 2 (two) times daily.      omeprazole 20 MG Oral Capsule Delayed Release Take 1 capsule (20 mg total) by mouth every morning before breakfast.      escitalopram 10 MG Oral Tab Take 1 tablet  (10 mg total) by mouth once daily.  2      Past Medical History:    Allergic rhinitis    Anxiety    Asthma (HCC)    Bipolar affective (HCC)    Displaced fracture of base of fifth metacarpal bone of right hand with delayed healing    Esophageal reflux    Extrinsic asthma, unspecified    Hepatitis, alcoholic, acute (HCC)    History of depression    PONV (postoperative nausea and vomiting)    Visual impairment    glasses/contacts      Social History:  Social History     Socioeconomic History    Marital status: Single   Tobacco Use    Smoking status: Never     Passive exposure: Never    Smokeless tobacco: Never   Vaping Use    Vaping status: Former    Substances: Nicotine, THC    Devices: Pre-filled pod   Substance and Sexual Activity    Alcohol use: Not Currently     Alcohol/week: 0.0 standard drinks of alcohol    Drug use: Yes     Types: Cannabis     Comment: medically perscribed marijuana for depression treatment   Other Topics Concern    Caffeine Concern Yes     Comment: occ    Exercise Yes     Comment: 3 times per week     Seat Belt Yes     Social Determinants of Health     Financial Resource Strain: Low Risk  (9/29/2023)    Financial Resource Strain     Difficulty of Paying Living Expenses: Not hard at all     Med Affordability: No   Transportation Needs: No Transportation Needs (9/29/2023)    Transportation Needs     Lack of Transportation: No         REVIEW OF SYSTEMS:   Review of Systems   Constitutional:  Positive for fatigue. Negative for chills, diaphoresis and fever.   HENT:  Positive for congestion, postnasal drip, rhinorrhea, sinus pressure, sinus pain and sore throat. Negative for ear pain, trouble swallowing and voice change.    Eyes: Negative.    Respiratory:  Positive for cough. Negative for shortness of breath, wheezing and stridor.    Cardiovascular: Negative.    Gastrointestinal: Negative.    Neurological: Negative.    Psychiatric/Behavioral: Negative.          EXAM:   /76 (BP Location: Left  arm, Patient Position: Sitting, Cuff Size: adult)   Pulse 67   Temp 98.1 °F (36.7 °C) (Oral)   Resp 16   Ht 5' 7\" (1.702 m)   Wt 208 lb (94.3 kg)   SpO2 97%   BMI 32.58 kg/m²   Physical Exam  Vitals and nursing note reviewed.   Constitutional:       Appearance: Normal appearance. He is normal weight.   HENT:      Right Ear: A middle ear effusion is present. Tympanic membrane is bulging. Tympanic membrane is not injected or erythematous.      Left Ear: A middle ear effusion is present. Tympanic membrane is not injected, erythematous or bulging.      Nose: Mucosal edema present.      Right Turbinates: Swollen.      Left Turbinates: Swollen.      Right Sinus: Maxillary sinus tenderness present. No frontal sinus tenderness.      Left Sinus: Maxillary sinus tenderness present. No frontal sinus tenderness.      Mouth/Throat:      Lips: Pink.      Mouth: Mucous membranes are moist.      Palate: No mass and lesions.      Pharynx: Uvula midline.      Tonsils: Tonsillar exudate (PND) present.   Cardiovascular:      Rate and Rhythm: Normal rate and regular rhythm.      Pulses: Normal pulses.      Heart sounds: Normal heart sounds.   Pulmonary:      Effort: Pulmonary effort is normal. No respiratory distress.      Breath sounds: Normal breath sounds. No stridor. No wheezing, rhonchi or rales.      Comments: +bronchospasm  Chest:      Chest wall: No tenderness.   Musculoskeletal:         General: Normal range of motion.   Skin:     General: Skin is warm and dry.      Capillary Refill: Capillary refill takes less than 2 seconds.   Neurological:      General: No focal deficit present.      Mental Status: He is alert and oriented to person, place, and time. Mental status is at baseline.   Psychiatric:         Mood and Affect: Mood normal.         Behavior: Behavior normal.         Thought Content: Thought content normal.         Judgment: Judgment normal.          ASSESSMENT AND PLAN:     Encounter Diagnoses   Name Primary?     Acute non-recurrent maxillary sinusitis Yes    Chronic sinusitis, unspecified location     Acute cough        -complete prednisone and antibiotic as prescribed   -f/u with ENT  Requested Prescriptions     Signed Prescriptions Disp Refills    predniSONE 20 MG Oral Tab 14 tablet 0     Sig: Take 2 tablets (40 mg total) by mouth daily for 7 days.    doxycycline 100 MG Oral Cap 14 capsule 0     Sig: Take 1 capsule (100 mg total) by mouth 2 (two) times daily.       Instructions given on increasing fluid intake  The patient indicates understanding of these issues and agrees to the plan.  The patient is asked to return in 3 days if not better. Call if fever or worsening symptoms.

## 2024-09-30 ENCOUNTER — OFFICE VISIT (OUTPATIENT)
Dept: INTERNAL MEDICINE CLINIC | Facility: CLINIC | Age: 29
End: 2024-09-30
Payer: COMMERCIAL

## 2024-09-30 VITALS
TEMPERATURE: 98 F | DIASTOLIC BLOOD PRESSURE: 76 MMHG | RESPIRATION RATE: 16 BRPM | WEIGHT: 208 LBS | OXYGEN SATURATION: 97 % | HEIGHT: 67 IN | SYSTOLIC BLOOD PRESSURE: 114 MMHG | BODY MASS INDEX: 32.65 KG/M2 | HEART RATE: 67 BPM

## 2024-09-30 DIAGNOSIS — J01.00 ACUTE NON-RECURRENT MAXILLARY SINUSITIS: Primary | ICD-10-CM

## 2024-09-30 DIAGNOSIS — J32.9 CHRONIC SINUSITIS, UNSPECIFIED LOCATION: ICD-10-CM

## 2024-09-30 DIAGNOSIS — R05.1 ACUTE COUGH: ICD-10-CM

## 2024-09-30 PROCEDURE — G2211 COMPLEX E/M VISIT ADD ON: HCPCS

## 2024-09-30 PROCEDURE — 3008F BODY MASS INDEX DOCD: CPT

## 2024-09-30 PROCEDURE — 3078F DIAST BP <80 MM HG: CPT

## 2024-09-30 PROCEDURE — 99213 OFFICE O/P EST LOW 20 MIN: CPT

## 2024-09-30 PROCEDURE — 3074F SYST BP LT 130 MM HG: CPT

## 2024-09-30 RX ORDER — PREDNISONE 20 MG/1
40 TABLET ORAL DAILY
Qty: 14 TABLET | Refills: 0 | Status: SHIPPED | OUTPATIENT
Start: 2024-09-30 | End: 2024-10-07

## 2024-09-30 RX ORDER — FLUTICASONE PROPIONATE 50 MCG
2 SPRAY, SUSPENSION (ML) NASAL DAILY
COMMUNITY

## 2024-09-30 RX ORDER — DOXYCYCLINE 100 MG/1
100 CAPSULE ORAL 2 TIMES DAILY
Qty: 14 CAPSULE | Refills: 0 | Status: SHIPPED | OUTPATIENT
Start: 2024-09-30

## 2025-01-02 ENCOUNTER — TELEPHONE (OUTPATIENT)
Age: 30
End: 2025-01-02

## 2025-01-02 NOTE — TELEPHONE ENCOUNTER
Deb Hamilton,    Here are some psychiatry resources that may be a good fit. Please verify your insurance coverage with any providers that you may choose to call and schedule with directly. If there is anything else I can assist with, then please give me a call at [phone]. If you need more immediate assistance, or assistance outside of business hours, please contact the [facility] 24/7 helpline at [phone].     Internal Psychiatry Providers through [facility]:    CHRIS Boyd  [facility]  [address]  [address]  [place] [address]  Phone: [phone]    TANVI Mott  [facility]   [address]  [address] [place] [address]  Phone: [phone]    Patti Lemons [facility] or [facility]  [address] [place] [address]  [address]  [place] [address]  Phone: [phone]    External Providers:    Dr. Masood Noel MD  [facility]  [address]  [address]  [place] [address]   Phone: [phone]    Dr. Roosevelt Nielson MD  [facility]  [address]  [address]  [place] [address]  Phone: [phone]    Andrew Coffman MD  [facility]  [address]  [address] [address]  Phone: [phone]    Dr. Jose Elias Antonio MD  [facility] [address] [place] [address] [address]  [place] [address]   Phone: [phone]      Tiara Katz (she/her/hers)  Patient Care Navigator Mental Health   [facility]/Mental Health Division  [phone] or 24/7 help line: 838-HDTNGYG  [email or web address]  Request an assessment or support »

## 2025-01-06 ENCOUNTER — PATIENT MESSAGE (OUTPATIENT)
Dept: PHYSICAL THERAPY | Age: 30
End: 2025-01-06

## 2025-01-10 ENCOUNTER — NURSE ONLY (OUTPATIENT)
Dept: ELECTROPHYSIOLOGY | Facility: HOSPITAL | Age: 30
End: 2025-01-10
Attending: Other
Payer: COMMERCIAL

## 2025-01-10 DIAGNOSIS — R25.9 ABNORMAL INVOLUNTARY MOVEMENT: ICD-10-CM

## 2025-01-10 DIAGNOSIS — F19.982: ICD-10-CM

## 2025-01-10 PROCEDURE — 95819 EEG AWAKE AND ASLEEP: CPT

## 2025-01-21 ENCOUNTER — PATIENT MESSAGE (OUTPATIENT)
Dept: PHYSICAL THERAPY | Age: 30
End: 2025-01-21

## 2025-01-21 ENCOUNTER — ORDER TRANSCRIPTION (OUTPATIENT)
Dept: SLEEP CENTER | Age: 30
End: 2025-01-21

## 2025-01-21 DIAGNOSIS — R25.9 ABNORMAL INVOLUNTARY MOVEMENT: ICD-10-CM

## 2025-01-21 DIAGNOSIS — G47.52 REM SLEEP BEHAVIOR DISORDER: Primary | ICD-10-CM

## 2025-01-21 DIAGNOSIS — F19.982: ICD-10-CM

## 2025-02-22 ENCOUNTER — OFFICE VISIT (OUTPATIENT)
Dept: SLEEP CENTER | Age: 30
End: 2025-02-22
Attending: INTERNAL MEDICINE
Payer: COMMERCIAL

## 2025-02-22 DIAGNOSIS — F19.982: ICD-10-CM

## 2025-02-22 DIAGNOSIS — R25.9 ABNORMAL INVOLUNTARY MOVEMENT: ICD-10-CM

## 2025-02-22 DIAGNOSIS — G47.52 REM SLEEP BEHAVIOR DISORDER: ICD-10-CM

## 2025-02-22 PROCEDURE — 95810 POLYSOM 6/> YRS 4/> PARAM: CPT

## 2025-06-30 ENCOUNTER — OFFICE VISIT (OUTPATIENT)
Dept: INTERNAL MEDICINE CLINIC | Facility: CLINIC | Age: 30
End: 2025-06-30
Payer: COMMERCIAL

## 2025-06-30 VITALS
SYSTOLIC BLOOD PRESSURE: 124 MMHG | DIASTOLIC BLOOD PRESSURE: 80 MMHG | TEMPERATURE: 98 F | RESPIRATION RATE: 16 BRPM | OXYGEN SATURATION: 98 % | HEIGHT: 67 IN | BODY MASS INDEX: 34.53 KG/M2 | HEART RATE: 69 BPM | WEIGHT: 220 LBS

## 2025-06-30 DIAGNOSIS — Z00.00 ROUTINE GENERAL MEDICAL EXAMINATION AT A HEALTH CARE FACILITY: ICD-10-CM

## 2025-06-30 DIAGNOSIS — M62.838 CERVICAL PARASPINOUS MUSCLE SPASM: ICD-10-CM

## 2025-06-30 DIAGNOSIS — Z00.00 ANNUAL PHYSICAL EXAM: Primary | ICD-10-CM

## 2025-06-30 DIAGNOSIS — F33.1 MODERATE RECURRENT MAJOR DEPRESSION (HCC): ICD-10-CM

## 2025-06-30 DIAGNOSIS — J45.20 MILD INTERMITTENT ASTHMA WITHOUT COMPLICATION (HCC): ICD-10-CM

## 2025-06-30 PROBLEM — F19.982: Status: RESOLVED | Noted: 2025-01-10 | Resolved: 2025-06-30

## 2025-06-30 PROBLEM — F32.A DEPRESSION: Status: RESOLVED | Noted: 2017-01-16 | Resolved: 2025-06-30

## 2025-06-30 PROBLEM — G47.33 OSA (OBSTRUCTIVE SLEEP APNEA): Status: ACTIVE | Noted: 2025-06-30

## 2025-06-30 RX ORDER — LAMOTRIGINE 150 MG/1
150 TABLET ORAL 2 TIMES DAILY
COMMUNITY
Start: 2025-05-22

## 2025-06-30 RX ORDER — PREDNISONE 20 MG/1
TABLET ORAL
Qty: 14 TABLET | Refills: 0 | Status: SHIPPED | OUTPATIENT
Start: 2025-06-30

## 2025-06-30 RX ORDER — CYCLOBENZAPRINE HCL 10 MG
10 TABLET ORAL NIGHTLY
Qty: 7 TABLET | Refills: 0 | Status: SHIPPED | OUTPATIENT
Start: 2025-06-30

## 2025-06-30 NOTE — PROGRESS NOTES
Subjective:   Patient ID: Douglas Dave is a 30 year old male.    Neck Pain     Douglas Dave is a 30 year old male who presents for a complete physical exam.   HPI:   Pt complains of left neck pain radiating to left arm. Shooting pain. Moderate pain. Sxs occurred after awkward sleep position.     He does report asthma and depression sxs are controlled    PAST MEDICAL, SOCIAL, FAMILY HISTORIES REVIEWED WITH PT      Immunization History   Administered Date(s) Administered    Covid-19 Vaccine Pfizer 30 mcg/0.3 ml 04/12/2021, 05/03/2021, 11/18/2021    DTAP 09/08/1997, 11/11/1997, 12/29/1998, 07/29/2002    FLULAVAL 6 months & older 0.5 ml Prefilled syringe (85400) 09/13/2018    FLUZONE 6 months and older PFS 0.5 ml (30209) 09/14/2016, 10/19/2017, 09/13/2018, 10/08/2021, 09/25/2022, 10/02/2023    Flucelvax 0.5 Ml Quad Multi-dose Vial 10/12/2020    Flucelvax 0.5 Ml Quad PFS Single Dose 10/01/2019    Fluvirin, 3 Years & >, Im 12/04/2012    HEP A 08/10/2006, 05/16/2007    HEP B 07/07/1997, 09/08/1997, 01/05/1998    HIB 09/08/1997, 11/11/1997, 01/05/1998, 10/08/1998    Hpv Virus Vaccine 9 Lauren Im 07/02/2019, 06/24/2022, 01/09/2023    IPV 09/08/1997, 11/11/1997, 07/09/1998, 09/07/2001    Influenza 10/15/2003, 10/06/2004, 10/12/2005, 11/02/2006, 01/03/2012, 10/20/2017, 10/01/2019, 10/12/2020    Influenza(Afluria)0.5ml QIV PFS 10/08/2021    MMR 07/09/1998, 09/07/2001    Meningococcal-Menactra 06/25/2010    Pneumococcal (Prevnar 13) 09/13/2018    Pneumovax 23 03/10/2021    TDAP 08/15/2008, 06/25/2010, 01/09/2023    Varicella 07/09/1998, 08/13/2007     Wt Readings from Last 6 Encounters:   06/30/25 220 lb (99.8 kg)   03/11/25 208 lb (94.3 kg)   12/11/24 208 lb (94.3 kg)   09/30/24 208 lb (94.3 kg)   05/06/24 213 lb (96.6 kg)   05/05/24 220 lb (99.8 kg)     Body mass index is 34.46 kg/m².     Lab Results   Component Value Date     (H) 03/13/2024     (H) 03/11/2024    GLU 91 11/09/2023     Lab Results    Component Value Date    CHOLEST 169 10/23/2023    CHOLEST 171 06/24/2022    CHOLEST 184 07/02/2019     Lab Results   Component Value Date    HDL 33 (L) 10/23/2023    HDL 38 (L) 06/24/2022    HDL 40 07/02/2019     Lab Results   Component Value Date    LDL 74 10/23/2023     (H) 06/24/2022     (H) 07/02/2019     Lab Results   Component Value Date    AST 17 03/13/2024    AST 12 (L) 03/11/2024    AST 17 11/09/2023     Lab Results   Component Value Date    ALT 22 03/13/2024    ALT 23 03/11/2024    ALT 26 11/09/2023     No results found for: \"PSA\"     Current Medications[1]   Past Medical History[2]   Past Surgical History[3]   Family History[4]   Social History:  Short Social Hx on File[5]   Occ: yes..   Exercise: none.  Diet: doesn't watch     REVIEW OF SYSTEMS:   GENERAL: feels well otherwise  A comprehensive 10 point review of systems was completed.     Pertinent positives and negatives noted in the HPI.        EXAM:   /80   Pulse 69   Temp 98.2 °F (36.8 °C)   Resp 16   Ht 5' 7\" (1.702 m)   Wt 220 lb (99.8 kg)   SpO2 98%   BMI 34.46 kg/m²   Body mass index is 34.46 kg/m².   GENERAL: well developed, well nourished,in no apparent distress  SKIN: no rashes,no suspicious lesions  HEENT: atraumatic, normocephalic,ears and throat are clear  EYES:PERRLA, EOMI, conjunctiva are clear  NECK: supple,no adenopathy,no bruits, tender left sternocleidomastoid with spasm  LUNGS: clear to auscultation  CARDIO: RRR without murmur  GI: good BS's,no masses, HSM or tenderness  : deferred  MUSCULOSKELETAL: back is not tender  EXTREMITIES: no cyanosis, clubbing or edema  NEURO: Oriented times three,,motor and sensory are grossly intact, DTR 2+ UE    ASSESSMENT AND PLAN:   Douglas Dave is a 30 year old male who presents for a complete physical exam.  Body mass index is 34.46 kg/m²., recommended low fat diet and aerobic exercise 30 minutes three times weekly.     Health maintenance, will check fasting  Lipids, CMP, CBC     Cervical neck spasm- finish prednisone and flexeril as ordered    Mild intermittent asthma without complication (HCC) -controlled. Cont current med therapy    Moderate recurrent major depression (HCC) -controlled. Cont current med therapy      Pt info handouts given for: exercise, low fat diet,     The patient indicates understanding of these issues and agrees to the plan.  The patient is asked to return for CPX in 12 m.      History/Other:   Review of Systems   Musculoskeletal:  Positive for neck pain.     Current Medications[6]  Allergies:Allergies[7]    Objective:   Physical Exam    Assessment & Plan:   1. Annual physical exam    2. Routine general medical examination at a health care facility    3. Cervical paraspinous muscle spasm    4. Mild intermittent asthma without complication (HCC)    5. Moderate recurrent major depression (HCC)        Orders Placed This Encounter   Procedures    CBC With Differential With Platelet    Comp Metabolic Panel (14)    Lipid Panel    TSH W Reflex To Free T4    Urinalysis, Routine       Meds This Visit:  Requested Prescriptions     Signed Prescriptions Disp Refills    cyclobenzaprine 10 MG Oral Tab 7 tablet 0     Sig: Take 1 tablet (10 mg total) by mouth nightly.    predniSONE 20 MG Oral Tab 14 tablet 0     Sig: Take 2 tablets by mouth daily x 7 days       Imaging & Referrals:  None         [1]   Current Outpatient Medications   Medication Sig Dispense Refill    lamoTRIgine 150 MG Oral Tab Take 1 tablet (150 mg total) by mouth 2 (two) times daily.      cyclobenzaprine 10 MG Oral Tab Take 1 tablet (10 mg total) by mouth nightly. 7 tablet 0    predniSONE 20 MG Oral Tab Take 2 tablets by mouth daily x 7 days 14 tablet 0    fluticasone propionate 50 MCG/ACT Nasal Suspension 2 sprays by Each Nare route daily.      fluocinonide 0.05 % External Cream Apply 1 Application topically 2 (two) times daily. 30 g 0    olopatadine (PATADAY) 0.1 % Ophthalmic Solution 2 (two)  times daily.      albuterol (2.5 MG/3ML) 0.083% Inhalation Nebu Soln Take 3 mL (2.5 mg total) by nebulization every 4 (four) hours as needed for Wheezing or Shortness of Breath. 540 mL 0    EPINEPHrine (AUVI-Q) 0.3 MG/0.3ML Injection Solution Auto-injector Inject 0.3 mL (1 each total) as directed as needed. 1 each 0    Multiple Vitamin Oral Tab Take 1 tablet by mouth daily.      Ascorbic Acid (VITAMIN C) 1000 MG Oral Tab Take 1 tablet (1,000 mg total) by mouth daily.      MONTELUKAST SODIUM 10 MG Oral Tab TAKE 1 TABLET BY MOUTH EVERY DAY AT BEDTIME 90 tablet 1    cetirizine 10 MG Oral Tab Take 1 tablet (10 mg total) by mouth daily.      Albuterol Sulfate  (90 Base) MCG/ACT Inhalation Aero Soln Inhale 1 puff into the lungs every 6 (six) hours as needed for Wheezing.      omeprazole 20 MG Oral Capsule Delayed Release Take 1 capsule (20 mg total) by mouth every morning before breakfast.     [2]   Past Medical History:   Allergic rhinitis    Anxiety    Asthma (HCC)    Bipolar affective (HCC)    Displaced fracture of base of fifth metacarpal bone of right hand with delayed healing    Esophageal reflux    Extrinsic asthma, unspecified    Hepatitis, alcoholic, acute (HCC)    History of depression    PONV (postoperative nausea and vomiting)    Visual impairment    glasses/contacts   [3]   Past Surgical History:  Procedure Laterality Date    Adenoidectomy  2010    Fracture surgery      Other surgical history  08/18/2017    balloon sinuplasty    Tonsillectomy  2010    Upper gi endoscopy - referral     [4]   Family History  Problem Relation Age of Onset    Depression Father     Anxiety Father     Hypertension Father     Diabetes Father     Hypertension Mother     Depression Mother     Asthma Mother     Heart Disease Mother     Depression Paternal Grandmother     Other (Hyperlipidemia) Paternal Grandmother     Other (Heart Murmur) Paternal Grandmother     Heart Disease Paternal Grandfather     Hypertension Paternal  Grandfather     Diabetes Paternal Grandfather     Depression Maternal Grandmother     Asthma Maternal Grandmother     Hypertension Maternal Grandfather     Depression Maternal Grandfather     Stroke Maternal Grandfather     Hypertension Other         Aunts    Hypertension Other         Uncles    Cancer Neg    [5]   Social History  Socioeconomic History    Marital status: Single   Tobacco Use    Smoking status: Never     Passive exposure: Never    Smokeless tobacco: Never   Vaping Use    Vaping status: Former    Substances: Nicotine, THC    Devices: Pre-filled pod   Substance and Sexual Activity    Alcohol use: Yes     Comment: socially    Drug use: Yes     Types: Cannabis     Comment: medically perscribed marijuana for depression treatment   Other Topics Concern    Caffeine Concern Yes     Comment: Tea-1 cup    Exercise Yes     Comment: daily walks    Seat Belt Yes     Social Drivers of Health     Transportation Needs: No Transportation Needs (9/29/2023)    Transportation Needs     Lack of Transportation: No   [6]   Current Outpatient Medications   Medication Sig Dispense Refill    lamoTRIgine 150 MG Oral Tab Take 1 tablet (150 mg total) by mouth 2 (two) times daily.      cyclobenzaprine 10 MG Oral Tab Take 1 tablet (10 mg total) by mouth nightly. 7 tablet 0    predniSONE 20 MG Oral Tab Take 2 tablets by mouth daily x 7 days 14 tablet 0    fluticasone propionate 50 MCG/ACT Nasal Suspension 2 sprays by Each Nare route daily.      fluocinonide 0.05 % External Cream Apply 1 Application topically 2 (two) times daily. 30 g 0    olopatadine (PATADAY) 0.1 % Ophthalmic Solution 2 (two) times daily.      albuterol (2.5 MG/3ML) 0.083% Inhalation Nebu Soln Take 3 mL (2.5 mg total) by nebulization every 4 (four) hours as needed for Wheezing or Shortness of Breath. 540 mL 0    EPINEPHrine (AUVI-Q) 0.3 MG/0.3ML Injection Solution Auto-injector Inject 0.3 mL (1 each total) as directed as needed. 1 each 0    Multiple Vitamin Oral  Tab Take 1 tablet by mouth daily.      Ascorbic Acid (VITAMIN C) 1000 MG Oral Tab Take 1 tablet (1,000 mg total) by mouth daily.      MONTELUKAST SODIUM 10 MG Oral Tab TAKE 1 TABLET BY MOUTH EVERY DAY AT BEDTIME 90 tablet 1    cetirizine 10 MG Oral Tab Take 1 tablet (10 mg total) by mouth daily.      Albuterol Sulfate  (90 Base) MCG/ACT Inhalation Aero Soln Inhale 1 puff into the lungs every 6 (six) hours as needed for Wheezing.      omeprazole 20 MG Oral Capsule Delayed Release Take 1 capsule (20 mg total) by mouth every morning before breakfast.     [7]   Allergies  Allergen Reactions    Droperidol OTHER (SEE COMMENTS)     Convulsions    Augmentin [Amoxicillin-Pot Clavulanate] DIARRHEA and NAUSEA AND VOMITING    Reglan [Metoclopramide] OTHER (SEE COMMENTS)     Dystonia     Mold ITCHING     Has EPI pen if needed    Morphine RASH

## 2025-08-23 ENCOUNTER — LAB ENCOUNTER (OUTPATIENT)
Dept: LAB | Facility: HOSPITAL | Age: 30
End: 2025-08-23
Attending: INTERNAL MEDICINE

## 2025-08-23 DIAGNOSIS — Z00.00 ROUTINE GENERAL MEDICAL EXAMINATION AT A HEALTH CARE FACILITY: ICD-10-CM

## 2025-08-23 LAB
ALBUMIN SERPL-MCNC: 5 G/DL (ref 3.2–4.8)
ALBUMIN/GLOB SERPL: 1.9 (ref 1–2)
ALP LIVER SERPL-CCNC: 77 U/L (ref 45–117)
ALT SERPL-CCNC: 14 U/L (ref 10–49)
ANION GAP SERPL CALC-SCNC: 15 MMOL/L (ref 0–18)
AST SERPL-CCNC: 18 U/L (ref ?–34)
BASOPHILS # BLD AUTO: 0.02 X10(3) UL (ref 0–0.2)
BASOPHILS NFR BLD AUTO: 0.4 %
BILIRUB SERPL-MCNC: 0.6 MG/DL (ref 0.3–1.2)
BILIRUB UR QL STRIP.AUTO: NEGATIVE
BUN BLD-MCNC: 12 MG/DL (ref 9–23)
CALCIUM BLD-MCNC: 10 MG/DL (ref 8.7–10.6)
CHLORIDE SERPL-SCNC: 105 MMOL/L (ref 98–112)
CHOLEST SERPL-MCNC: 180 MG/DL (ref ?–200)
CLARITY UR REFRACT.AUTO: CLEAR
CO2 SERPL-SCNC: 22 MMOL/L (ref 21–32)
COLOR UR AUTO: YELLOW
CREAT BLD-MCNC: 1.04 MG/DL (ref 0.7–1.3)
EGFRCR SERPLBLD CKD-EPI 2021: 100 ML/MIN/1.73M2 (ref 60–?)
EOSINOPHIL # BLD AUTO: 0.07 X10(3) UL (ref 0–0.7)
EOSINOPHIL NFR BLD AUTO: 1.4 %
ERYTHROCYTE [DISTWIDTH] IN BLOOD BY AUTOMATED COUNT: 12.9 %
FASTING PATIENT LIPID ANSWER: YES
FASTING STATUS PATIENT QL REPORTED: YES
GLOBULIN PLAS-MCNC: 2.6 G/DL (ref 2–3.5)
GLUCOSE BLD-MCNC: 111 MG/DL (ref 70–99)
GLUCOSE UR STRIP.AUTO-MCNC: NORMAL MG/DL
HCT VFR BLD AUTO: 42.1 % (ref 39–53)
HDLC SERPL-MCNC: 35 MG/DL (ref 40–59)
HGB BLD-MCNC: 14.8 G/DL (ref 13–17.5)
IMM GRANULOCYTES # BLD AUTO: 0.02 X10(3) UL (ref 0–1)
IMM GRANULOCYTES NFR BLD: 0.4 %
KETONES UR STRIP.AUTO-MCNC: NEGATIVE MG/DL
LDLC SERPL CALC-MCNC: 122 MG/DL (ref ?–100)
LEUKOCYTE ESTERASE UR QL STRIP.AUTO: 75
LYMPHOCYTES # BLD AUTO: 1.9 X10(3) UL (ref 1–4)
LYMPHOCYTES NFR BLD AUTO: 36.7 %
MCH RBC QN AUTO: 29.1 PG (ref 26–34)
MCHC RBC AUTO-ENTMCNC: 35.2 G/DL (ref 31–37)
MCV RBC AUTO: 82.7 FL (ref 80–100)
MONOCYTES # BLD AUTO: 0.46 X10(3) UL (ref 0.1–1)
MONOCYTES NFR BLD AUTO: 8.9 %
NEUTROPHILS # BLD AUTO: 2.71 X10 (3) UL (ref 1.5–7.7)
NEUTROPHILS # BLD AUTO: 2.71 X10(3) UL (ref 1.5–7.7)
NEUTROPHILS NFR BLD AUTO: 52.2 %
NITRITE UR QL STRIP.AUTO: NEGATIVE
NONHDLC SERPL-MCNC: 145 MG/DL (ref ?–130)
OSMOLALITY SERPL CALC.SUM OF ELEC: 294 MOSM/KG (ref 275–295)
PH UR STRIP.AUTO: 6.5 (ref 5–8)
PLATELET # BLD AUTO: 201 10(3)UL (ref 150–450)
POTASSIUM SERPL-SCNC: 4.1 MMOL/L (ref 3.5–5.1)
PROT SERPL-MCNC: 7.6 G/DL (ref 5.7–8.2)
RBC # BLD AUTO: 5.09 X10(6)UL (ref 4.3–5.7)
RBC UR QL AUTO: NEGATIVE
SODIUM SERPL-SCNC: 142 MMOL/L (ref 136–145)
SP GR UR STRIP.AUTO: 1.02 (ref 1–1.03)
TRIGL SERPL-MCNC: 126 MG/DL (ref 30–149)
TSI SER-ACNC: 1.56 UIU/ML (ref 0.55–4.78)
UROBILINOGEN UR STRIP.AUTO-MCNC: NORMAL MG/DL
VLDLC SERPL CALC-MCNC: 22 MG/DL (ref 0–30)
WBC # BLD AUTO: 5.2 X10(3) UL (ref 4–11)

## 2025-08-23 PROCEDURE — 36415 COLL VENOUS BLD VENIPUNCTURE: CPT

## 2025-08-23 PROCEDURE — 81001 URINALYSIS AUTO W/SCOPE: CPT

## 2025-08-23 PROCEDURE — 85025 COMPLETE CBC W/AUTO DIFF WBC: CPT

## 2025-08-23 PROCEDURE — 80061 LIPID PANEL: CPT

## 2025-08-23 PROCEDURE — 84443 ASSAY THYROID STIM HORMONE: CPT

## 2025-08-23 PROCEDURE — 80053 COMPREHEN METABOLIC PANEL: CPT

## (undated) DEVICE — PIN GUARD 0.062 GREEN

## (undated) DEVICE — DERMABOND LIQUID ADHESIVE

## (undated) DEVICE — STANDARD HYPODERMIC NEEDLE,POLYPROPYLENE HUB: Brand: MONOJECT

## (undated) DEVICE — STERILE POLYISOPRENE POWDER-FREE SURGICAL GLOVES: Brand: PROTEXIS

## (undated) DEVICE — UPPER EXTREMITY CDS-LF: Brand: MEDLINE INDUSTRIES, INC.

## (undated) DEVICE — BANDAGE ELASTIC ACE 3IN STERIL

## (undated) DEVICE — 3M™ STERI-STRIP™ REINFORCED ADHESIVE SKIN CLOSURES, R1547, 1/2 IN X 4 IN (12 MM X 100 MM), 6 STRIPS/ENVELOPE: Brand: 3M™ STERI-STRIP™

## (undated) DEVICE — SUTURE VICRYL 3-0 RB-1

## (undated) DEVICE — NON-ADHERENT STRIPS,OIL EMULSION: Brand: CURITY

## (undated) DEVICE — GOWN,SIRUS,FABRIC-REINFORCED,X-LARGE: Brand: MEDLINE

## (undated) DEVICE — ZIMMER® STERILE DISPOSABLE TOURNIQUET CUFF WITH PLC, DUAL PORT, SINGLE BLADDER, 18 IN. (46 CM)

## (undated) DEVICE — BANDAGE ROLL,100% COTTON, 6 PLY, LARGE: Brand: KERLIX

## (undated) DEVICE — PADDING CAST SOFT ROLL 3IN

## (undated) DEVICE — OCCLUSIVE GAUZE STRIP OVERWRAP,3% BISMUTH TRIBROMOPHENATE IN PETROLATUM BLEND: Brand: XEROFORM

## (undated) DEVICE — SPLINT PRECUT SYNTH 5X30

## (undated) DEVICE — Device

## (undated) DEVICE — KENDALL SCD EXPRESS SLEEVES, KNEE LENGTH, MEDIUM: Brand: KENDALL SCD

## (undated) DEVICE — SOL  .9 1000ML BTL

## (undated) DEVICE — C-ARM: Brand: UNBRANDED

## (undated) DEVICE — 1010 S-DRAPE TOWEL DRAPE 10/BX: Brand: STERI-DRAPE™

## (undated) DEVICE — REM POLYHESIVE ADULT PATIENT RETURN ELECTRODE: Brand: VALLEYLAB

## (undated) DEVICE — SUTURE MONOCRYL 3-0 PS-2

## (undated) DEVICE — PADDING CAST COTTON STER 3

## (undated) DEVICE — DISPOSABLE BIPOLAR FORCEPS 4" (10.2CM) JEWELERS, STRAIGHT 0.4MM TIP AND 12 FT. (3.6M) CABLE: Brand: KIRWAN

## (undated) NOTE — ED AVS SNAPSHOT
[facility] Emergency Department    [place] [address]    Phone:  [phone]    Fax:  [phone]           Kala Kern   MRN: [id number]    Department:  [facility] Emergency Department   Date of Visit: IF THERE IS ANY CHANGE OR WORSENING OF YOUR CONDITION, CALL YOUR PRIMARY CARE PHYSICIAN AT ONCE OR RETURN IMMEDIATELY TO THE EMERGENCY DEPARTMENT.     If you have been prescribed any medication(s), please fill your prescription right away and begin taking t

## (undated) NOTE — MR AVS SNAPSHOT
[address]  [address] [id number] [phone]               Thank you for choosing us for your health care visit with Jose Alberto Trejo PA-C.   We are glad to serve you and happy to provide you with This list is accurate as of: 1/16/17  1:07 PM.  Always use your most recent med list.                amoxicillin 875 MG Tabs   Take 1 tablet (875 mg total) by mouth 2 (two) times daily.    Commonly known as:  AMOXIL           CLARITIN OR   Take 1 tablet by Phone:  [phone]    - amoxicillin 875 MG Tabs            Follow-up Instructions     Return if symptoms worsen or fail to improve.          MyChart     Visit [facility]  You can access your MyChart to more actively manage your health care and view more de 2 ½ hours per week – spread out over time Use a mani to keep you motivated   Don’t forget strength training with weights and resistance Set goals and track your progress   You don’t need to join a gym. Home exercises work great.  Put more priority on exe

## (undated) NOTE — LETTER
ASTHMA ACTION PLAN for Becky Aguayo     : 1995     Date: 2018  Provider:  Kendy Renteria MD  Phone for doctor or clinic: [facility], [address] [place] [address] [facility] [id number] [phone]    ACT Sc

## (undated) NOTE — LETTER
Date & Time: 9/28/2023, 10:53 AM  Patient: Jc Rosado       To Whom It May Concern:      Jasmin George was seen and treated 9/24/23- 9/28/23.        Thank you,      Gilmar Worley RN

## (undated) NOTE — ED AVS SNAPSHOT
Jose Luis Avelar   MRN: [id number]    Department:  [facility] Emergency Department   Date of Visit:  11/9/2019           Disclosure     Insurance plans vary and the physician(s) referred by the ER may not be covered by your plan.  Please contact your tell this physician (or your personal doctor if your instructions are to return to your personal doctor) about any new or lasting problems. The primary care or specialist physician will see patients referred from the [facility] Emergency Department.  Naya Reddy

## (undated) NOTE — LETTER
23    Patient Name: Darline Leyva    : 1995     To Whom it may concern: This letter has been written with the patient's permission. The patient is currently under my medical care. This patient should be excused from working in-person on 23 and 23. He is cleared to work from home on these days.          Sincerely,       Carolina Salazar PA-C

## (undated) NOTE — LETTER
ASTHMA ACTION PLAN for Krishna Odor     : 1995     Date: 1/15/2019  Provider:  TANVI Vela  Phone for doctor or clinic: [facility], [address], [address] [place] [address] [phone]

## (undated) NOTE — ED AVS SNAPSHOT
[facility] Emergency Department    [place]  [address] [place] [address]    Phone:  [phone]    Fax:  [phone]           Ashly Garza   MRN: [id number]    Department:  [facility] Emergency Department   Date of Visit: IF THERE IS ANY CHANGE OR WORSENING OF YOUR CONDITION, CALL YOUR PRIMARY CARE PHYSICIAN AT ONCE OR RETURN IMMEDIATELY TO THE EMERGENCY DEPARTMENT.     If you have been prescribed any medication(s), please fill your prescription right away and begin taking t

## (undated) NOTE — Clinical Note
ASTHMA ACTION PLAN for Johan Monahan     : 1995     Date: 2017  Provider:  Alejandro De Dios MD  Phone for doctor or clinic: [facility], [place] [address], [address] [phone]

## (undated) NOTE — ED AVS SNAPSHOT
[facility] Emergency Department    [place]  [address] [place] [address]    Phone:  [phone]    Fax:  [phone]           Rubio Estevez   MRN: [id number]    Department:  [facility] Emergency Department   Date of Visit: Commonly known as:  DELTASONE   Take 1 tablet (20 mg total) by mouth daily. What changed: This medication is already on your medication list.  Do NOT take both doses of the new and old medication. ONLY take the dose prescribed today.             Where t tell this physician (or your personal doctor if your instructions are to return to your personal doctor) about any new or lasting problems. The primary care or specialist physician will see patients referred from the [facility] Emergency Department.  Sadie Ervin - If you are a smoker or have smoked in the last 12 months, we encourage you to explore options for quitting.     - If you have concerns related to behavioral health issues or thoughts of harming yourself, contact [facility] a Summaries. If you've been to the Emergency Department or your doctor's office, you can view your past visit information in NETpeas by going to Visits < Visit Summaries. NETpeas questions? Call [phone] for help.   NETpeas is NOT to be used for urge

## (undated) NOTE — LETTER
ASTHMA ACTION PLAN for Virginie Valencia     : 1995     Date: 10/2/2023  Provider:  Charisma Fernando MD  Phone for doctor or clinic: [facility], [address] [address], [address] [place] [address]  [place] [address]  [phone]    ACT Score: 25      You can use the colors of a traffic light to help learn about your asthma medicines. 1. Green - Go! % of Personal Best Peak Flow Use controller medicine. Breathing is good  No cough or wheeze  Can work and play Medicine How much to take When to take it    MONTELUKAST SODIUM 10 MG Oral Tab TAKE 1 TABLET BY MOUTH EVERY DAY AT BEDTIME   cetirizine 10 MG Oral Tab Take 1 tablet (10 mg total) by mouth daily       2. Yellow - Caution. 50-79% Personal Best Peak  Flow. Use reliever medicine to keep an asthma attack from getting bad. Cough  Wheezing  Tight Chest  Wake up at night Medicine How much to take When to take it    albuterol (2.5 MG/3ML) 0.083% Inhalation Nebu Soln Take 3 mL (2.5 mg total) by nebulization every 4 (four) hours as needed for Wheezing or Shortness of Breath   Albuterol Sulfate  (90 Base) MCG/ACT Inhalation Aero Soln   Inhale 1 puff into the lungs every 6 (six) hours as needed for Wheezing        Additional instructions         3. Red - Stop! Danger!  <50% Personal Best Peak  Flow. Take these medications until  Get help from a doctor   Medicine not helping  Breathing is hard and fast  Nose opens wide  Can't walk  Ribs show  Can't talk well Medicine How much to take When to take it    2 puffs now! Call 911  Go to nearest ER        Additional Instructions If your symptoms do not improve and you cannot contact your doctor, [facility] or call 911 immediately! [x] Asthma Action Plan reviewed with patient (and caregiver if necessary) and a copy of the plan was given to the patient/caregiver.    [] Asthma Action Plan reviewed with patient (and caregiver if necessary) on the phone and mailed copy to patient or submitted via [address]      Signatures:  Provider  Amada Price MD   Patient Caretaker

## (undated) NOTE — LETTER
Patient Name: Cinthya Barkley  YOB: 1995          MRN number:  [id number]  Date:  3/16/2021  Referring Physician:  No ref.  provider found    Discharge Summary  Initial Functional Outcome Score ***/100  Final Functional Outcome Score ***/ increase R wrist FLX/EXT to 40/60 degrees to ease holding steering wheel, position hand to type on computer, write, eat  14 visits. Improving,  MET. 5- begin strengthening once MD approves.  MET  6- NEW: 2/15/21: increase R wrist flx to 60 degrees to ease

## (undated) NOTE — LETTER
ASTHMA ACTION PLAN for Krishna Ruff     : 1995     Date: 2022  Provider:  Chichi Villa PA-C  Phone for doctor or clinic: [facility], [facility], [address] [place] [address]  [place] [id number] [phone]    ACT Score: 25      You can use the colors of a traffic light to help learn about your asthma medicines. 1. Green - Go! % of Personal Best Peak Flow Use controller medicine. Breathing is good  No cough or wheeze  Can work and play Medicine How much to take When to take it    Singulair (Montelukast) 10 mg by mouth daily       2. Yellow - Caution. 50-79% Personal Best Peak  Flow. Use reliever medicine to keep an asthma attack from getting bad. Cough  Wheezing  Tight Chest  Wake up at night Medicine How much to take When to take it    Albuterol inhaler,  2 puffs every four hours as needed. Additional instructions         3. Red - Stop! Danger!  <50% Personal Best Peak  Flow. Take these medications until  Get help from a doctor   Medicine not helping  Breathing is hard and fast  Nose opens wide  Can't walk  Ribs show  Can't talk well Medicine How much to take When to take it    Albuterol inhaler, 2 puffs NOW     Additional Instructions If your symptoms do not improve and you cannot contact your doctor, [facility] or call 911 immediately! [x] Asthma Action Plan reviewed with patient (and caregiver if necessary) and a copy of the plan was given to the patient/caregiver. [] Asthma Action Plan reviewed with patient (and caregiver if necessary) on the phone and mailed copy to patient or submitted via [employer].      Signatures:  Provider  Chichi Villa PA-C   Patient Caretaker

## (undated) NOTE — MR AVS SNAPSHOT
[address]  [address] [facility] [address] [phone]               Thank you for choosing us for your health care visit with Devang Miller PA-C.   We are glad to serve you and happy to provide you with Commonly known as:  OMNICEF           CLARITIN OR   Take 1 tablet by mouth daily. escitalopram 10 MG Tabs   Take 10 mg by mouth once daily.    Commonly known as:  LEXAPRO           Fluticasone Propionate 50 MCG/ACT Susp   INSTILL 2 SPRAYS BY NASAL discharge instructions in [facility] by going to Visits < Admission Summaries. If you've been to the Emergency Department or your doctor's office, you can view your past visit information in [facility] by going to Visits < Visit Summaries. ciValue questions?

## (undated) NOTE — LETTER
[facility]  [address] 63 [address] [facility], [address] [place] [address]    Consent for Operation    Date: ______06/29/2018____________    Time: _______________    1.  I authorize the performance upon Satya Malone the following operation:    Procedure(s):  ESOPHAGOGAS procedure has been videotaped, the surgeon will obtain the original videotape. The hospital will not be responsible for storage or maintenance of this tape.     6. For the purpose of advancing medical education, I consent to the admittance of observers to t STATEMENTS REQUIRING INSERTION OR COMPLETION WERE FILLED IN.     Signature of Patient:   ___________________________    When the patient is a minor or mentally incompetent to give consent:  Signature of person authorized to consent for patient: ____________ drugs/illegal medications). Failure to inform my anesthesiologist about these medicines may increase my risk of anesthetic complications. · If I am allergic to anything or have had a reaction to anesthesia before.     3. I understand how the anesthesia med I have discussed the procedure and information above with the patient (or patient’s representative) and answered their questions. The patient or their representative has agreed to have anesthesia services.     _______________________________________________

## (undated) NOTE — ED AVS SNAPSHOT
[facility] Emergency Department    [place]  [address] [place] [address]    Phone:  [phone]    Fax:  [phone]           Ariel Cano   MRN: [id number]    Department:  [facility] Emergency Department   Date of Visit: IF THERE IS ANY CHANGE OR WORSENING OF YOUR CONDITION, CALL YOUR PRIMARY CARE PHYSICIAN AT ONCE OR RETURN IMMEDIATELY TO THE EMERGENCY DEPARTMENT.     If you have been prescribed any medication(s), please fill your prescription right away and begin taking t

## (undated) NOTE — ED AVS SNAPSHOT
[facility] Emergency Department    [facility]  [address] [address]    Phone:  [phone]    Fax:  [phone]           Sharon Marino   MRN: [id number]    Department:  [facility] Emergency Department   Date of Visit: ondansetron 8 MG Tbdp   Quantity:  10 tablet   Commonly known as:  ZOFRAN-ODT   Take 1 tablet (8 mg total) by mouth every 4 (four) hours as needed for Nausea.             Where to Get Your Medications      You can get these medications from any pharmacy to a primary care or a specialist physician for a follow-up visit, please tell this physician (or your personal doctor if your instructions are to return to your personal doctor) about any new or lasting problems.  The primary care or specialist physician w We are concerned for your overall well being:    - If you are a smoker or have smoked in the last 12 months, we encourage you to explore options for quitting.     - If you have concerns related to behavioral health issues or thoughts of harming yourself,

## (undated) NOTE — LETTER
11/20/20        William Orr  [address] [place] [address]      [facility],    [facility] records indicate that you have outstanding lab work and or testing that was ordered for you and has not yet been completed:  Orders Placed This Encounte

## (undated) NOTE — LETTER
Date: 10/2/2023    Patient Name: Augustine Renteria          To Whom it may concern: The above patient was seen at the [facility] for treatment of a medical condition. This patient should be excused from attending work from 9/24/2023 through 9/28/2023. The patient may return to work on 10/2/2023 without limitations .         Sincerely,        Alec Duran MD

## (undated) NOTE — LETTER
ASTHMA ACTION PLAN for Orma High     : 1995     Date: 2018  Provider:  Anup Gonzalez MD  Phone for doctor or clinic: [address] [place] [address], [facility] [address] [facility] [facility] [phone]

## (undated) NOTE — MR AVS SNAPSHOT
[address]  [address] [address] [phone]               Thank you for choosing us for your health care visit with Frank Owen PA-C.   We are glad to serve you and happy to provide you with · Whole-grain or bran breads, pastas, and cereals  · Legumes (beans) and peas  As you begin to eat more fiber, be sure to drink plenty of water to keep your digestive system working smoothly. Tips  Do's and don'ts include:   · Don’t skip meals.  This often This list is accurate as of: 2/17/17  3:59 PM.  Always use your most recent med list.                CLARITIN OR   Take 1 tablet by mouth daily. escitalopram 10 MG Tabs   Take 10 mg by mouth once daily.    Commonly known as:  Yelena Goel           Flu DON [address] , [phone], [address], [address]     Phone:  [phone]    - Lidocaine Viscous 2 % Soln            Follow-up Instructions     Return if symptoms worsen or fail to improve.

## (undated) NOTE — ED AVS SNAPSHOT
[facility] Emergency Department    [place]  [address] [address]    Phone:  [phone]    Fax:  [phone]           Carloz Rosemary   MRN: [id number]    Department:  [facility] Emergency Department   Date of Visit: Click [email or web address]      Or call [phone]    If you have any problems with your follow-up, please call our  at [phone]    Si usted tiene algun problema con tao sequimiento, por favor llame a nuestro adminstrador de casos al ([facility]        Pharmacy Address Phone Number   [address] ([address]) [address].  ([address] [facility] [address] Dictated by: Keshia Mejia MD on 4/17/2017 at 17:56       Approved by: Keshia Mejia MD              Narrative:    PROCEDURE:  XR ANKLE (MIN 3 VIEWS), RIGHT (CPT=73610)     TECHNIQUE:  Three views were obtained.      COMPARISON:  [facility] (MIN 3 VIE

## (undated) NOTE — LETTER
Date: 11/11/2019    Patient Name: Claudia Sorensen          To Whom it may concern: This letter has been written at the patient's request. The above patient was seen at the [facility] for treatment of a medical condition.     This patient theo

## (undated) NOTE — LETTER
ASTHMA ACTION PLAN for Dannielle Martinez     : 1995     Date: 10/18/2021  Provider:  Miranda Tejada PA-C  Phone for doctor or clinic: [address] [place] [address], [address] [phone]